# Patient Record
Sex: MALE | Race: WHITE | NOT HISPANIC OR LATINO | Employment: OTHER | URBAN - METROPOLITAN AREA
[De-identification: names, ages, dates, MRNs, and addresses within clinical notes are randomized per-mention and may not be internally consistent; named-entity substitution may affect disease eponyms.]

---

## 2017-01-12 ENCOUNTER — OFFICE VISIT (OUTPATIENT)
Dept: INFECTIOUS DISEASES | Facility: CLINIC | Age: 42
End: 2017-01-12
Payer: MEDICARE

## 2017-01-12 ENCOUNTER — OFFICE VISIT (OUTPATIENT)
Dept: ORTHOPEDICS | Facility: CLINIC | Age: 42
End: 2017-01-12
Payer: MEDICARE

## 2017-01-12 VITALS
BODY MASS INDEX: 26.42 KG/M2 | RESPIRATION RATE: 18 BRPM | DIASTOLIC BLOOD PRESSURE: 82 MMHG | SYSTOLIC BLOOD PRESSURE: 139 MMHG | HEIGHT: 76 IN | HEART RATE: 84 BPM | WEIGHT: 216.94 LBS

## 2017-01-12 VITALS
HEIGHT: 76 IN | HEART RATE: 77 BPM | SYSTOLIC BLOOD PRESSURE: 128 MMHG | TEMPERATURE: 98 F | DIASTOLIC BLOOD PRESSURE: 78 MMHG | BODY MASS INDEX: 26.23 KG/M2 | WEIGHT: 215.38 LBS

## 2017-01-12 DIAGNOSIS — L03.113 CELLULITIS OF RIGHT UPPER ARM: Primary | ICD-10-CM

## 2017-01-12 DIAGNOSIS — Z09 S/P ORTHOPEDIC SURGERY, FOLLOW-UP EXAM: ICD-10-CM

## 2017-01-12 DIAGNOSIS — Z21 HIV POSITIVE: ICD-10-CM

## 2017-01-12 DIAGNOSIS — L03.113 CELLULITIS OF RIGHT UPPER ARM: ICD-10-CM

## 2017-01-12 DIAGNOSIS — R21 RASH, SKIN: ICD-10-CM

## 2017-01-12 DIAGNOSIS — L02.413 ABSCESS OF RIGHT SHOULDER: ICD-10-CM

## 2017-01-12 DIAGNOSIS — K64.0 FIRST DEGREE HEMORRHOIDS: ICD-10-CM

## 2017-01-12 DIAGNOSIS — R22.31 MASS OF RIGHT UPPER EXTREMITY: Primary | ICD-10-CM

## 2017-01-12 PROCEDURE — 99999 PR PBB SHADOW E&M-EST. PATIENT-LVL III: CPT | Mod: PBBFAC,,, | Performed by: PHYSICIAN ASSISTANT

## 2017-01-12 PROCEDURE — 99213 OFFICE O/P EST LOW 20 MIN: CPT | Mod: PBBFAC,27 | Performed by: INTERNAL MEDICINE

## 2017-01-12 PROCEDURE — 99214 OFFICE O/P EST MOD 30 MIN: CPT | Mod: S$PBB,,, | Performed by: INTERNAL MEDICINE

## 2017-01-12 PROCEDURE — 99024 POSTOP FOLLOW-UP VISIT: CPT | Mod: ,,, | Performed by: PHYSICIAN ASSISTANT

## 2017-01-12 PROCEDURE — 99999 PR PBB SHADOW E&M-EST. PATIENT-LVL III: CPT | Mod: PBBFAC,,, | Performed by: INTERNAL MEDICINE

## 2017-01-12 RX ORDER — KETOCONAZOLE 20 MG/G
CREAM TOPICAL 2 TIMES DAILY
Qty: 60 G | Refills: 12 | Status: SHIPPED | OUTPATIENT
Start: 2017-01-12 | End: 2017-11-15

## 2017-01-12 RX ORDER — TRIAMCINOLONE ACETONIDE 1 MG/G
CREAM TOPICAL 2 TIMES DAILY
Qty: 30 G | Refills: 2 | Status: SHIPPED | OUTPATIENT
Start: 2017-01-12 | End: 2017-11-15

## 2017-01-12 NOTE — PROGRESS NOTES
Subjective:      Patient ID: Moisés Koehler is a 41 y.o. male.    Chief Complaint:Follow-up      History of Present Illness  Developed abscess at right deltoid injection site - no culture growth or organisms on stains. Had I+D, tolerated Bactrim, no finished Bactrim yesterday.  Has rashes on legs - chronic. Still itching.  States that he is having multiple stools - no blood, no diarrhea. Now has hemorrhoid. Does not think that his colitis is acting up.   Not using injection testosterone. Wants to start HGH or topical testosterone.    Review of Systems   Constitution: Negative for chills, decreased appetite, fever, weakness, malaise/fatigue, night sweats, weight gain and weight loss.   HENT: Negative for congestion, ear pain, headaches, hearing loss, hoarse voice, sore throat and tinnitus.    Eyes: Negative for blurred vision, redness and visual disturbance.   Cardiovascular: Negative for chest pain, leg swelling and palpitations.   Respiratory: Negative for cough, hemoptysis, shortness of breath and sputum production.    Hematologic/Lymphatic: Negative for adenopathy. Does not bruise/bleed easily.   Skin: Positive for itching. Negative for dry skin, rash and suspicious lesions.   Musculoskeletal: Negative for back pain, joint pain, myalgias and neck pain.   Gastrointestinal: Positive for diarrhea. Negative for abdominal pain, constipation, heartburn, nausea and vomiting.   Genitourinary: Positive for frequency. Negative for dysuria, flank pain, hematuria, hesitancy and urgency.   Neurological: Negative for dizziness, numbness and paresthesias.   Psychiatric/Behavioral: Negative for depression and memory loss. The patient does not have insomnia and is not nervous/anxious.      Objective:   Physical Exam   Constitutional: He is oriented to person, place, and time. He appears well-developed and well-nourished.   HENT:   Head: Normocephalic and atraumatic.   Eyes: Conjunctivae are normal. Pupils are equal, round,  and reactive to light.   Neck: Normal range of motion. Neck supple.   Cardiovascular: Normal rate, regular rhythm and normal heart sounds.    Pulmonary/Chest: Effort normal and breath sounds normal.   Abdominal: Soft. Bowel sounds are normal.   Musculoskeletal: Normal range of motion. He exhibits no edema.        Right shoulder: He exhibits normal range of motion, no tenderness, no bony tenderness, no swelling, no deformity, no laceration (healed incision - see picture.) and no pain.   Neurological: He is alert and oriented to person, place, and time.   Skin: Skin is warm and dry.        Psychiatric: He has a normal mood and affect. His behavior is normal. Judgment and thought content normal.   Nursing note and vitals reviewed.                Assessment:       1. Mass of right upper extremity    2. Cellulitis of right upper arm    3. HIV positive    4. Abscess of right shoulder    5. Rash, skin    6. First degree hemorrhoids          Plan:       Will start proctofoam for hemorrhoids. Will refer to CRS if colitis flares.  Continue HAART - no need to check HIV parameters at this time  Start Nizoral cream for rash on right leg - will try steroid cream on rash on left leg.   Patient has sterile abscesses from testosterone injections - will pursue topical testosterone cream.  Return in 1 month

## 2017-01-12 NOTE — PROGRESS NOTES
Mr. Koehler is here today for a post-operative visit after a   Irrigation and debridement of right upper extremity  by Dr. Busch on 12/27/2016.  he reports that he is doing well.  Pain is controlled.  he is  taking pain medication.  he denies fever, chills, and sweats since the time of the surgery.   Stated that he has taken antibiotic, Bactrim,  as prescribed by infectious disease.     Physical exam:  Post op dressing taken down.  Incision is clean, dry and intact.  Sutures removed without difficulty.  full range of motion of shoulder pain free. NVI,     Assessment:  Post-op visit (2 weeks)    Plan:  - weight bearing as tolerated, range of motion as tolerated.  - continue antibiotic as prescribed by infectious disease, Has followed up with ID.   - return to clinic as needed.

## 2017-01-12 NOTE — LETTER
January 12, 2017      Francisco Singh MD  [address]  [facility] [address]           [facility]  [address]  [facility] [address]  Phone: [phone]          Patient: Moisés Koehler   MR Number: [id number]   YOB: 1975   Date of Visit: 1/12/2017       Dear Dr. Francisco Singh:    Thank you for referring Moisés Koehler to me for evaluation. Attached you will find relevant portions of my assessment and plan of care.    If you have questions, please do not hesitate to call me. I look forward to following Moisés Koehler along with you.    Sincerely,    Geneva Knott PA-C    Enclosure  CC:  No Recipients    If you would like to receive this communication electronically, please contact [email or web address] or [phone] to request more information on [facility] [employer] access.    For providers and/or their staff who would like to refer a patient to [facility], please contact us through our one-stop-shop provider referral line, Radah Contreras, at [phone].    If you feel you have received this communication in error or would no longer like to receive these types of communications, please e-mail [email or web address]

## 2017-01-12 NOTE — MR AVS SNAPSHOT
[facility]  [address]  [facility] [address]  Phone: [phone]  Fax: [phone]                  Moisés Koehler   2017 9:00 AM   Office Visit    Description:  Male : 1975   Provider:  Smooth Millan MD   Department:  [facility]           Reason for Visit     Follow-up           Diagnoses this Visit        Comments    Mass of right upper extremity    -  Primary     Cellulitis of right upper arm         HIV positive         Abscess of right shoulder         Rash, skin         First degree hemorrhoids                To Do List           Future Appointments        Provider Department Dept Phone    2017 9:40 AM LAB, APPOINTMENT [place] [facility] [phone]    2017 1:30 PM Rakesh العلي MD [facility] - [facility] Medicine [phone]    2/3/2017 1:00 PM [facility] [phone]      Goals (5 Years of Data)     None       These Medications        Disp Refills Start End    ketoconazole (NIZORAL) 2 % cream 60 g 12 2017    Apply topically 2 (two) times daily. Apply to right leg - Topical (Top)    Pharmacy: [facility] -- [facility], [place] - [address] [place] [address], [address] Ph #: [phone]       triamcinolone acetonide 0.1% (KENALOG) 0.1 % cream 30 g 2 2017    Apply topically 2 (two) times daily. Apply to left  leg - Topical (Top)    Pharmacy: [facility] -- [facility], [place] - [address] [place] [address], [address] Ph #: [phone]       hydrocortisone-pramoxine (PROCTOFOAM-HS) rectal foam 10 g 2 2017     Place 1 applicator rectally 2 (two) times daily. - Rectal    Pharmacy: [facility] -- [facility], [place] - [address], [address] Ph #: [phone]         [facility] On [facility]     [facility] On Call Nurse Care Line -  Assistance  Registered nurses in the [facility] provide clinical  advisement, health education, appointment booking, and other advisory services.  Call for this free service at [phone].             Medications           Message regarding Medications     Verify the changes and/or additions to your medication regime listed below are the same as discussed with your clinician today.  If any of these changes or additions are incorrect, please notify your healthcare provider.        START taking these NEW medications        Refills    ketoconazole (NIZORAL) 2 % cream 12    Sig: Apply topically 2 (two) times daily. Apply to right leg    Class: Print    Route: Topical (Top)    triamcinolone acetonide 0.1% (KENALOG) 0.1 % cream 2    Sig: Apply topically 2 (two) times daily. Apply to left  leg    Class: Normal    Route: Topical (Top)    hydrocortisone-pramoxine (PROCTOFOAM-HS) rectal foam 2    Sig: Place 1 applicator rectally 2 (two) times daily.    Class: Normal    Route: Rectal           Verify that the below list of medications is an accurate representation of the medications you are currently taking.  If none reported, the list may be blank. If incorrect, please contact your healthcare provider. Carry this list with you in case of emergency.           Current Medications     efavirenz-emtrictabine-tenofovir 600-200-300 mg (ATRIPLA) 600-200-300 mg Tab Take 1 tablet by mouth every evening.    lisinopril (PRINIVIL,ZESTRIL) 40 MG tablet Take 1 tablet (40 mg total) by mouth once daily.    NEXIUM 40 mg capsule TAKE ONE CAPSULE BY MOUTH EVERY DAY    hydrocortisone-pramoxine (PROCTOFOAM-HS) rectal foam Place 1 applicator rectally 2 (two) times daily.    ketoconazole (NIZORAL) 2 % cream Apply topically 2 (two) times daily. Apply to right leg    triamcinolone acetonide 0.1% (KENALOG) 0.1 % cream Apply topically 2 (two) times daily. Apply to left  leg           Clinical Reference Information           Vital Signs - Last Recorded  Most recent update: 1/12/2017  8:31 AM by Valerie Baron MA  "   BP Pulse Temp Ht Wt BMI    128/78 77 97.9 °F (36.6 °C) (Oral) 6' 4" (1.93 m) 97.7 kg (215 lb 6.2 oz) 26.22 kg/m2      Blood Pressure          Most Recent Value    BP  128/78      Allergies as of 1/12/2017     No Known Allergies      Immunizations Administered on Date of Encounter - 1/12/2017     None      "

## 2017-01-13 RX ORDER — EFAVIRENZ, EMTRICITABINE, AND TENOFOVIR DISOPROXIL FUMARATE 600; 200; 300 MG/1; MG/1; MG/1
TABLET, FILM COATED ORAL
Qty: 30 TABLET | Refills: 0 | Status: SHIPPED | OUTPATIENT
Start: 2017-01-13 | End: 2017-02-08 | Stop reason: SDUPTHER

## 2017-01-25 ENCOUNTER — PATIENT MESSAGE (OUTPATIENT)
Dept: INFECTIOUS DISEASES | Facility: CLINIC | Age: 42
End: 2017-01-25

## 2017-01-25 DIAGNOSIS — R63.4 WEIGHT LOSS, NON-INTENTIONAL: Primary | ICD-10-CM

## 2017-02-09 RX ORDER — EFAVIRENZ, EMTRICITABINE, AND TENOFOVIR DISOPROXIL FUMARATE 600; 200; 300 MG/1; MG/1; MG/1
TABLET, FILM COATED ORAL
Qty: 30 TABLET | Refills: 0 | Status: SHIPPED | OUTPATIENT
Start: 2017-02-09 | End: 2017-03-14 | Stop reason: SDUPTHER

## 2017-02-13 ENCOUNTER — OFFICE VISIT (OUTPATIENT)
Dept: INFECTIOUS DISEASES | Facility: CLINIC | Age: 42
End: 2017-02-13
Payer: MEDICARE

## 2017-02-13 VITALS
BODY MASS INDEX: 25.82 KG/M2 | TEMPERATURE: 98 F | WEIGHT: 212.06 LBS | SYSTOLIC BLOOD PRESSURE: 131 MMHG | HEIGHT: 76 IN | HEART RATE: 78 BPM | DIASTOLIC BLOOD PRESSURE: 82 MMHG

## 2017-02-13 DIAGNOSIS — R12 CHRONIC HEARTBURN: ICD-10-CM
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
## 2017-02-13 DIAGNOSIS — R63.4 WEIGHT LOSS, NON-INTENTIONAL: Primary | ICD-10-CM

## 2017-02-13 DIAGNOSIS — R21 RASH, SKIN: ICD-10-CM

## 2017-02-13 DIAGNOSIS — L02.413 ABSCESS OF RIGHT SHOULDER: ICD-10-CM

## 2017-02-13 DIAGNOSIS — Z21 HIV POSITIVE: ICD-10-CM

## 2017-02-13 DIAGNOSIS — I10 ESSENTIAL HYPERTENSION: ICD-10-CM

## 2017-02-13 PROCEDURE — 99213 OFFICE O/P EST LOW 20 MIN: CPT | Mod: PBBFAC | Performed by: INTERNAL MEDICINE

## 2017-02-13 PROCEDURE — 99214 OFFICE O/P EST MOD 30 MIN: CPT | Mod: S$PBB,,, | Performed by: INTERNAL MEDICINE

## 2017-02-13 PROCEDURE — 90714 TD VACC NO PRESV 7 YRS+ IM: CPT | Mod: PBBFAC | Performed by: INTERNAL MEDICINE

## 2017-02-13 PROCEDURE — 99999 PR PBB SHADOW E&M-EST. PATIENT-LVL III: CPT | Mod: PBBFAC,,, | Performed by: INTERNAL MEDICINE

## 2017-02-13 RX ORDER — DOXYCYCLINE HYCLATE 100 MG
100 TABLET ORAL 2 TIMES DAILY
Qty: 30 TABLET | Refills: 3 | Status: SHIPPED | OUTPATIENT
Start: 2017-02-13 | End: 2017-02-28

## 2017-02-13 NOTE — PROGRESS NOTES
Subjective:      Patient ID: Moisés Koehler is a 41 y.o. male.    Chief Complaint:Follow-up      History of Present Illness  Stepped on nail on Saturday. No fever or chills - worried about a piece of boot being stuck in foot. Losing weight - worried that he is losing muscle mass.  Needs Tetanus booster.    Review of Systems   Constitution: Negative for chills, decreased appetite, fever, weakness, malaise/fatigue, night sweats, weight gain and weight loss.   HENT: Negative for congestion, ear pain, headaches, hearing loss, hoarse voice, sore throat and tinnitus.    Eyes: Negative for blurred vision, redness and visual disturbance.   Cardiovascular: Negative for chest pain, leg swelling and palpitations.   Respiratory: Negative for cough, hemoptysis, shortness of breath and sputum production.    Hematologic/Lymphatic: Negative for adenopathy. Does not bruise/bleed easily.   Skin: Positive for itching and rash. Negative for dry skin and suspicious lesions.   Musculoskeletal: Negative for back pain, joint pain, myalgias and neck pain.   Gastrointestinal: Negative for abdominal pain, constipation, diarrhea, heartburn, nausea and vomiting.   Genitourinary: Negative for dysuria, flank pain, frequency, hematuria, hesitancy and urgency.   Neurological: Negative for dizziness, numbness and paresthesias.   Psychiatric/Behavioral: Negative for depression and memory loss. The patient does not have insomnia and is not nervous/anxious.      Objective:   Physical Exam   Constitutional: He is oriented to person, place, and time. He appears well-developed. He appears cachectic.   HENT:   Head: Normocephalic and atraumatic.   Mild temporal wasting   Eyes: Conjunctivae are normal. Pupils are equal, round, and reactive to light.   Neck: Normal range of motion. Neck supple.   Cardiovascular: Normal rate, regular rhythm and normal heart sounds.    Pulmonary/Chest: Effort normal and breath sounds normal.   Abdominal: Soft. Bowel  sounds are normal.   Musculoskeletal: Normal range of motion. He exhibits no edema.        Right shoulder: He exhibits normal range of motion, no tenderness, no bony tenderness, no swelling, no deformity, no laceration (healed incision - see picture.) and no pain.        Feet:    Feet:   Left Foot:   Skin Integrity: Negative for ulcer, blister or erythema.   Neurological: He is alert and oriented to person, place, and time.   Skin: Skin is warm and dry.        Psychiatric: He has a normal mood and affect. His behavior is normal. Judgment and thought content normal.   Nursing note and vitals reviewed.                Assessment:       1. Weight loss, non-intentional    2. HIV positive    3. Chronic heartburn    4. Essential hypertension    5. Abscess of right shoulder    6. Injury by nail, initial encounter    7. Rash, skin          Plan:       Tetanus booster today  Serostim for HIV wasting  Doxycycline for possible infection  Check HIV parameters  Return in 1 month

## 2017-02-14 ENCOUNTER — LAB VISIT (OUTPATIENT)
Dept: LAB | Facility: HOSPITAL | Age: 42
End: 2017-02-14
Attending: INTERNAL MEDICINE
Payer: MEDICARE

## 2017-02-14 DIAGNOSIS — I10 ESSENTIAL HYPERTENSION: ICD-10-CM

## 2017-02-14 DIAGNOSIS — R63.4 WEIGHT LOSS, NON-INTENTIONAL: ICD-10-CM

## 2017-02-14 DIAGNOSIS — L02.413 ABSCESS OF RIGHT SHOULDER: ICD-10-CM

## 2017-02-14 DIAGNOSIS — R21 RASH, SKIN: ICD-10-CM

## 2017-02-14 DIAGNOSIS — R12 CHRONIC HEARTBURN: ICD-10-CM
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
## 2017-02-14 DIAGNOSIS — Z21 HIV POSITIVE: ICD-10-CM

## 2017-02-14 PROCEDURE — 84270 ASSAY OF SEX HORMONE GLOBUL: CPT

## 2017-02-14 PROCEDURE — 87536 HIV-1 QUANT&REVRSE TRNSCRPJ: CPT

## 2017-02-14 PROCEDURE — 86361 T CELL ABSOLUTE COUNT: CPT

## 2017-02-14 PROCEDURE — 36415 COLL VENOUS BLD VENIPUNCTURE: CPT

## 2017-02-15 LAB
CD3+CD4+ CELLS # BLD: 789 CELLS/UL (ref 300–1400)
CD3+CD4+ CELLS NFR BLD: 30.8 % (ref 28–57)

## 2017-02-16 ENCOUNTER — PATIENT MESSAGE (OUTPATIENT)
Dept: INFECTIOUS DISEASES | Facility: CLINIC | Age: 42
End: 2017-02-16

## 2017-02-16 LAB
HIV UQ DATE RECEIVED: NORMAL
HIV UQ DATE REPORTED: NORMAL
HIV1 RNA # SERPL NAA+PROBE: <40 COPIES/ML
HIV1 RNA SERPL NAA+PROBE-LOG#: <1.6 LOG (10) COPIES/ML
HIV1 RNA SERPL QL NAA+PROBE: NOT DETECTED

## 2017-02-18 LAB
ALBUMIN SERPL-MCNC: 4.9 G/DL (ref 3.6–5.1)
SHBG SERPL-SCNC: 53 NMOL/L (ref 10–50)
TESTOST FREE SERPL-MCNC: 30.2 PG/ML (ref 46–224)
TESTOST SERPL-MCNC: 357 NG/DL (ref 250–1100)
TESTOSTERONE.FREE+WB SERPL-MCNC: 67.3 NG/DL (ref 110–575)

## 2017-02-20 ENCOUNTER — PATIENT MESSAGE (OUTPATIENT)
Dept: INFECTIOUS DISEASES | Facility: CLINIC | Age: 42
End: 2017-02-20

## 2017-03-02 ENCOUNTER — PATIENT MESSAGE (OUTPATIENT)
Dept: INFECTIOUS DISEASES | Facility: CLINIC | Age: 42
End: 2017-03-02

## 2017-03-06 ENCOUNTER — OFFICE VISIT (OUTPATIENT)
Dept: INFECTIOUS DISEASES | Facility: CLINIC | Age: 42
End: 2017-03-06
Payer: MEDICARE

## 2017-03-06 VITALS
WEIGHT: 215.63 LBS | BODY MASS INDEX: 26.26 KG/M2 | DIASTOLIC BLOOD PRESSURE: 78 MMHG | TEMPERATURE: 98 F | HEART RATE: 84 BPM | SYSTOLIC BLOOD PRESSURE: 137 MMHG | HEIGHT: 76 IN

## 2017-03-06 DIAGNOSIS — E34.9 TESTOSTERONE DEFICIENCY: ICD-10-CM

## 2017-03-06 DIAGNOSIS — Z21 HIV POSITIVE: Primary | ICD-10-CM
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
## 2017-03-06 DIAGNOSIS — R63.4 WEIGHT LOSS, NON-INTENTIONAL: ICD-10-CM

## 2017-03-06 DIAGNOSIS — R21 RASH, SKIN: ICD-10-CM
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
## 2017-03-06 PROCEDURE — 99213 OFFICE O/P EST LOW 20 MIN: CPT | Mod: PBBFAC | Performed by: INTERNAL MEDICINE

## 2017-03-06 PROCEDURE — 99214 OFFICE O/P EST MOD 30 MIN: CPT | Mod: S$PBB,,, | Performed by: INTERNAL MEDICINE

## 2017-03-06 PROCEDURE — 99999 PR PBB SHADOW E&M-EST. PATIENT-LVL III: CPT | Mod: PBBFAC,,, | Performed by: INTERNAL MEDICINE

## 2017-03-06 NOTE — PROGRESS NOTES
Subjective:      Patient ID: Moisés Koehler is a 41 y.o. male.    Chief Complaint:Follow-up      History of Present Illness    Did not receive serostim. Has lost weight. Low appetite. Rash on legs is better, slightly.  Wants to pursue serostim and not testosterone replacement at this time.    Review of Systems   Constitution: Negative for chills, decreased appetite, fever, weakness, malaise/fatigue, night sweats, weight gain and weight loss.   HENT: Negative for congestion, ear pain, headaches, hearing loss, hoarse voice, sore throat and tinnitus.    Eyes: Negative for blurred vision, redness and visual disturbance.   Cardiovascular: Negative for chest pain, leg swelling and palpitations.   Respiratory: Negative for cough, hemoptysis, shortness of breath and sputum production.    Hematologic/Lymphatic: Negative for adenopathy. Does not bruise/bleed easily.   Skin: Positive for rash. Negative for dry skin, itching and suspicious lesions.   Musculoskeletal: Positive for joint swelling. Negative for back pain, joint pain, myalgias and neck pain.   Gastrointestinal: Negative for abdominal pain, constipation, diarrhea, heartburn, nausea and vomiting.   Genitourinary: Negative for dysuria, flank pain, frequency, hematuria, hesitancy and urgency.   Neurological: Negative for dizziness, numbness and paresthesias.   Psychiatric/Behavioral: Positive for depression. Negative for memory loss. The patient is nervous/anxious. The patient does not have insomnia.      Objective:   Physical Exam   Constitutional: He is oriented to person, place, and time. He appears well-developed. He appears cachectic.   HENT:   Head: Normocephalic and atraumatic.   Mild temporal wasting   Eyes: Conjunctivae are normal. Pupils are equal, round, and reactive to light.   Neck: Normal range of motion. Neck supple.   Cardiovascular: Normal rate, regular rhythm and normal heart sounds.    Pulmonary/Chest: Effort normal and breath sounds normal.    Abdominal: Soft. Bowel sounds are normal.   Musculoskeletal: Normal range of motion. He exhibits no edema.        Right shoulder: He exhibits normal range of motion, no tenderness, no bony tenderness, no swelling, no deformity, no laceration (healed incision - see picture.) and no pain.        Feet:    Feet:   Left Foot:   Skin Integrity: Negative for ulcer, blister or erythema.   Neurological: He is alert and oriented to person, place, and time.   Skin: Skin is warm and dry.        Psychiatric: His speech is normal and behavior is normal. Judgment and thought content normal. He exhibits a depressed mood.   Nursing note and vitals reviewed.            Assessment:       1. HIV positive    2. Injury by nail, subsequent encounter    3. Testosterone deficiency    4. Rash, skin    5. Weight loss, non-intentional          Plan:       Called [employer] - they stated PA was necessary for serostim. Asked them to re-fax it to [phone].  Nail wound is resolved.  Rash - will continue antifungal cream  Continue HAART

## 2017-03-08 ENCOUNTER — PATIENT MESSAGE (OUTPATIENT)
Dept: INFECTIOUS DISEASES | Facility: CLINIC | Age: 42
End: 2017-03-08

## 2017-03-14 RX ORDER — EFAVIRENZ, EMTRICITABINE, AND TENOFOVIR DISOPROXIL FUMARATE 600; 200; 300 MG/1; MG/1; MG/1
TABLET, FILM COATED ORAL
Qty: 30 TABLET | Refills: 0 | Status: SHIPPED | OUTPATIENT
Start: 2017-03-14 | End: 2017-04-05 | Stop reason: SDUPTHER

## 2017-03-21 ENCOUNTER — PATIENT MESSAGE (OUTPATIENT)
Dept: INFECTIOUS DISEASES | Facility: CLINIC | Age: 42
End: 2017-03-21

## 2017-03-31 ENCOUNTER — PATIENT MESSAGE (OUTPATIENT)
Dept: INFECTIOUS DISEASES | Facility: CLINIC | Age: 42
End: 2017-03-31

## 2017-04-05 RX ORDER — EFAVIRENZ, EMTRICITABINE, AND TENOFOVIR DISOPROXIL FUMARATE 600; 200; 300 MG/1; MG/1; MG/1
TABLET, FILM COATED ORAL
Qty: 30 TABLET | Refills: 0 | Status: SHIPPED | OUTPATIENT
Start: 2017-04-05 | End: 2017-05-18 | Stop reason: SDUPTHER

## 2017-05-18 RX ORDER — EFAVIRENZ, EMTRICITABINE AND TENOFOVIR DISOPROXIL FUMARATE 600; 200; 300 MG/1; MG/1; MG/1
1 TABLET, FILM COATED ORAL NIGHTLY
Qty: 30 TABLET | Refills: 0 | Status: SHIPPED | OUTPATIENT
Start: 2017-05-18 | End: 2017-06-12 | Stop reason: SDUPTHER

## 2017-06-12 ENCOUNTER — PATIENT MESSAGE (OUTPATIENT)
Dept: INFECTIOUS DISEASES | Facility: CLINIC | Age: 42
End: 2017-06-12

## 2017-06-12 RX ORDER — EFAVIRENZ, EMTRICITABINE AND TENOFOVIR DISOPROXIL FUMARATE 600; 200; 300 MG/1; MG/1; MG/1
1 TABLET, FILM COATED ORAL NIGHTLY
Qty: 30 TABLET | Refills: 0 | Status: SHIPPED | OUTPATIENT
Start: 2017-06-12 | End: 2017-07-18 | Stop reason: SDUPTHER

## 2017-07-18 RX ORDER — EFAVIRENZ, EMTRICITABINE AND TENOFOVIR DISOPROXIL FUMARATE 600; 200; 300 MG/1; MG/1; MG/1
1 TABLET, FILM COATED ORAL NIGHTLY
Qty: 30 TABLET | Refills: 0 | Status: SHIPPED | OUTPATIENT
Start: 2017-07-18 | End: 2017-08-15 | Stop reason: SDUPTHER

## 2017-08-15 RX ORDER — EFAVIRENZ, EMTRICITABINE AND TENOFOVIR DISOPROXIL FUMARATE 600; 200; 300 MG/1; MG/1; MG/1
1 TABLET, FILM COATED ORAL NIGHTLY
Qty: 30 TABLET | Refills: 0 | Status: SHIPPED | OUTPATIENT
Start: 2017-08-15 | End: 2017-09-18 | Stop reason: SDUPTHER

## 2017-09-05 ENCOUNTER — OFFICE VISIT (OUTPATIENT)
Dept: INFECTIOUS DISEASES | Facility: CLINIC | Age: 42
End: 2017-09-05
Payer: MEDICARE

## 2017-09-05 VITALS
BODY MASS INDEX: 25.56 KG/M2 | WEIGHT: 209.88 LBS | HEIGHT: 76 IN | SYSTOLIC BLOOD PRESSURE: 136 MMHG | TEMPERATURE: 99 F | DIASTOLIC BLOOD PRESSURE: 90 MMHG | HEART RATE: 79 BPM

## 2017-09-05 DIAGNOSIS — E34.9 TESTOSTERONE DEFICIENCY: ICD-10-CM

## 2017-09-05 DIAGNOSIS — E88.A HIV DISEASE RESULTING IN WASTING SYNDROME: ICD-10-CM

## 2017-09-05 DIAGNOSIS — R63.4 WEIGHT LOSS, NON-INTENTIONAL: Primary | ICD-10-CM

## 2017-09-05 DIAGNOSIS — R21 RASH, SKIN: ICD-10-CM

## 2017-09-05 DIAGNOSIS — K51.00 ULCERATIVE PANCOLITIS WITHOUT COMPLICATION: ICD-10-CM

## 2017-09-05 DIAGNOSIS — Z85.07 PERSONAL HISTORY OF MALIGNANT NEOPLASM OF PANCREAS: ICD-10-CM

## 2017-09-05 DIAGNOSIS — R10.13 EPIGASTRIC PAIN: ICD-10-CM

## 2017-09-05 DIAGNOSIS — G89.29 CHRONIC LEFT SHOULDER PAIN: ICD-10-CM

## 2017-09-05 DIAGNOSIS — Z85.00 PERSONAL HISTORY OF MALIGNANT NEOPLASM OF DIGESTIVE ORGAN: ICD-10-CM

## 2017-09-05 DIAGNOSIS — M25.512 CHRONIC LEFT SHOULDER PAIN: ICD-10-CM

## 2017-09-05 DIAGNOSIS — S43.102S SEPARATION OF LEFT ACROMIOCLAVICULAR JOINT, SEQUELA: ICD-10-CM

## 2017-09-05 DIAGNOSIS — B20 HIV DISEASE RESULTING IN WASTING SYNDROME: ICD-10-CM

## 2017-09-05 PROBLEM — S43.102A SEPARATION OF LEFT ACROMIOCLAVICULAR JOINT: Status: ACTIVE | Noted: 2017-09-05

## 2017-09-05 PROCEDURE — 3075F SYST BP GE 130 - 139MM HG: CPT | Mod: ,,, | Performed by: INTERNAL MEDICINE

## 2017-09-05 PROCEDURE — 3080F DIAST BP >= 90 MM HG: CPT | Mod: ,,, | Performed by: INTERNAL MEDICINE

## 2017-09-05 PROCEDURE — 99999 PR PBB SHADOW E&M-EST. PATIENT-LVL III: CPT | Mod: PBBFAC,,, | Performed by: INTERNAL MEDICINE

## 2017-09-05 PROCEDURE — 99214 OFFICE O/P EST MOD 30 MIN: CPT | Mod: S$PBB,,, | Performed by: INTERNAL MEDICINE

## 2017-09-05 PROCEDURE — 99213 OFFICE O/P EST LOW 20 MIN: CPT | Mod: PBBFAC | Performed by: INTERNAL MEDICINE

## 2017-09-05 NOTE — PROGRESS NOTES
Subjective:      Patient ID: Moisés Koehler is a 41 y.o. male.    Chief Complaint:Follow-up      History of Present Illness  Notes bloating and pain. Has had weight loss and muscle loss. Still has skin rash.  Broke shoulder in past. Now has lost muscle tone - in constant pain. Feels grinding.  Stopped taking Lisinopril - was making him sweat. Now he doesn't sweat as much.    Mother  of pancreatic cancer. He has not taken care of his ulcerative colitis. Admits that he may have UC as part of his bloating - worried that he may have cancer.    Has lost a lot of overall weight since last visit, also lost muscle mass, despite exercise and protein diet.    Review of Systems   Constitution: Positive for malaise/fatigue and weight loss. Negative for chills, decreased appetite, fever, weakness, night sweats and weight gain.   HENT: Negative for congestion, ear pain, hearing loss, hoarse voice, sore throat and tinnitus.    Eyes: Negative for blurred vision, redness and visual disturbance.   Cardiovascular: Negative for chest pain, leg swelling and palpitations.   Respiratory: Negative for cough, hemoptysis, shortness of breath and sputum production.    Hematologic/Lymphatic: Negative for adenopathy. Does not bruise/bleed easily.   Skin: Positive for rash. Negative for dry skin, itching and suspicious lesions.   Musculoskeletal: Negative for back pain, joint pain, myalgias and neck pain.   Gastrointestinal: Positive for abdominal pain and heartburn. Negative for constipation, diarrhea, nausea and vomiting.   Genitourinary: Negative for dysuria, flank pain, frequency, hematuria, hesitancy and urgency.   Neurological: Negative for dizziness, headaches, numbness and paresthesias.   Psychiatric/Behavioral: Negative for depression and memory loss. The patient does not have insomnia and is not nervous/anxious.      Objective:   Physical Exam   Constitutional: He is oriented to person, place, and time. He appears cachectic.    HENT:   Head: Normocephalic and atraumatic.   Eyes: Conjunctivae and EOM are normal. Pupils are equal, round, and reactive to light.   Neck: Normal range of motion. Neck supple.   Cardiovascular: Normal rate, regular rhythm and normal heart sounds.    Pulmonary/Chest: Effort normal and breath sounds normal.   Abdominal: Soft. Bowel sounds are normal. He exhibits no distension and no pulsatile midline mass. There is no hepatosplenomegaly. There is tenderness in the epigastric area. There is no rigidity and no rebound.   Musculoskeletal: Normal range of motion. He exhibits no edema.        Left shoulder: He exhibits tenderness and bony tenderness. He exhibits no effusion.        Arms:  Neurological: He is alert and oriented to person, place, and time.   Skin: Skin is warm and dry.   Nursing note and vitals reviewed.    Assessment:       1. Weight loss, non-intentional    2. Testosterone deficiency    3. 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 Rash, skin    5. Epigastric pain    6. Personal history of malignant neoplasm of pancreas     7. Personal history of malignant neoplasm of pancreas    8. Ulcerative pancolitis without complication    9. Personal history of malignant neoplasm of digestive organ     10. Chronic left shoulder pain    11. Separation of left acromioclavicular joint, sequela          Plan:       Will start Serostim for HIV wasting  Continue HIV meds - check parameters  Will check testosterone levels - refer to urology for possible treatment  Check amylase, lipase, cancer serology for weight loss, abdominal pain, history of pancreatic cancer, risk for colon CA  Will discuss screening colonoscopy at next visit  Refer to orthopedics for shoulder evaluation - patient has had injury in past,  AC joint on left in past.  Will watch BP, may be from pain in left shoulder  Will start norvasc if no improvement  Follow up in 3 months  I spent over 50% of a 45 minute encounter counseling the  patient.

## 2017-09-06 ENCOUNTER — TELEPHONE (OUTPATIENT)
Dept: PHARMACY | Facility: CLINIC | Age: 42
End: 2017-09-06

## 2017-09-06 NOTE — TELEPHONE ENCOUNTER
Hi Dr Millan and staff,    Wanted to clarify the rx we received for Serostim (somatropin 6mg)    Per [facility] dosing for HIV-associated wasting, cachexia:  Serostim: SubQ: Initial: 0.1 mg/kg once daily at bedtime (maximum: 6 mg/day)    Please verify the dose (rx says 9mg/day)  And the quantity-- would be 1 vial/day x 28 days    Please advise, thank you!    Rand Ragsdale, Pharm.D  Specialty Pharmacy Clinical Pharmacist  [facility]  Phone: [phone]

## 2017-09-06 NOTE — TELEPHONE ENCOUNTER
Hi Dr Millan and staff,    I will addend the rx to state the following    Serostim 6mg  Inject 6mg SQ daily  #28 vials   5 refills    Unable to confirm if [facility] can dispense this drug but we will proceed with the PA based on this information if that is ok. Thank you!    Rand Ragsdale, Pharm.D  Specialty Pharmacy Clinical Pharmacist  [facility]  Phone: [phone]

## 2017-09-07 ENCOUNTER — TELEPHONE (OUTPATIENT)
Dept: PHARMACY | Facility: CLINIC | Age: 42
End: 2017-09-07

## 2017-09-07 ENCOUNTER — PATIENT MESSAGE (OUTPATIENT)
Dept: INFECTIOUS DISEASES | Facility: CLINIC | Age: 42
End: 2017-09-07

## 2017-09-08 NOTE — TELEPHONE ENCOUNTER
Hi Dr Millan and staff,    Patient's plan has denied coverage of Serostim because plan requires patient to have tried and failed at least 2 of the formulary alternatives which include the following  · Megestrol  · Dronabinol  · Cyproheptadine  · Norditropin flexpro (requires prior authorization)    If you have documentation or medical records supporting the necessity for SEROSTIM then we may have a basis for an appeal. Please advise hwo you would like to proceed. If you change it to Norditropin please send us a new rx. Thank you!    Thank you for your time.     Rand Ragsdale, Pharm.D  Specialty Pharmacy Clinical Pharmacist  [facility]  Phone: [phone]

## 2017-09-11 ENCOUNTER — PATIENT MESSAGE (OUTPATIENT)
Dept: INFECTIOUS DISEASES | Facility: CLINIC | Age: 42
End: 2017-09-11

## 2017-09-13 ENCOUNTER — PATIENT MESSAGE (OUTPATIENT)
Dept: INFECTIOUS DISEASES | Facility: CLINIC | Age: 42
End: 2017-09-13

## 2017-09-14 ENCOUNTER — TELEPHONE (OUTPATIENT)
Dept: PHARMACY | Facility: CLINIC | Age: 42
End: 2017-09-14

## 2017-09-14 NOTE — TELEPHONE ENCOUNTER
[facility] received a prescription for Norditropin and it will require a prior authorization with their insurance company. We will update patient of status as more information is received.

## 2017-09-15 ENCOUNTER — PATIENT MESSAGE (OUTPATIENT)
Dept: INFECTIOUS DISEASES | Facility: CLINIC | Age: 42
End: 2017-09-15

## 2017-09-15 NOTE — TELEPHONE ENCOUNTER
Appeal pending for serostim- spoke to [facility] for a peer to peer and they said someone will call back with 24 hours (business days)    Dr Millan aware of this. Not moving forward with Norditropin rx at this time due to dosing.     Rand Ragsdale, Pharm.D  Specialty Pharmacy Clinical Pharmacist  [facility]  Phone: [phone]

## 2017-09-18 DIAGNOSIS — Z21 HIV POSITIVE: Primary | ICD-10-CM

## 2017-09-18 RX ORDER — EFAVIRENZ, EMTRICITABINE AND TENOFOVIR DISOPROXIL FUMARATE 600; 200; 300 MG/1; MG/1; MG/1
1 TABLET, FILM COATED ORAL NIGHTLY
Qty: 30 TABLET | Refills: 0 | Status: SHIPPED | OUTPATIENT
Start: 2017-09-18 | End: 2017-10-17 | Stop reason: SDUPTHER

## 2017-09-18 NOTE — TELEPHONE ENCOUNTER
DOCUMENTATION ONLY         NORDITROPIN   PA was submitted to ins.  PA denied gave to [facility]

## 2017-09-19 NOTE — TELEPHONE ENCOUNTER
Hi Dr Millan and staff,    Wanted to inform you Serostim is a limited distribution medication and [facility] does not have access to order/dispense it.    Per insurance-- they will pay for it at [facility] and we confirmed they can dispense Serostim.     [facility]  [address] [place], [place] [address]   [phone]    I've added it to his preferred pharmacy list for future fills. Also verbally transferred the rx to them and they will reach out to the patient.    Patient has also been informed and given [facility]'s information.    Sorry for any confusion. Let me know if you have any questions.     Rand Ragsdale, Pharm.D  Specialty Pharmacy Clinical Pharmacist  [facility]  Phone: [phone]

## 2017-09-19 NOTE — TELEPHONE ENCOUNTER
Hi Dr Millan and staff,     Good news! Wanted to inform you the denial for Serostim was overturned and is now covered for $0 copay- Serostim 6mg- Inject 6mg into the skin daily- #28 vials for 28 day supply. Approved until further notice.      We are verifying ordering details for the Serostim and will be touch with the patient to confirm delivery.     Let me know if you have any questions.    Thank you,    Rand Ragsdale, Pharm.D  Specialty Pharmacy Clinical Pharmacist  [facility]  Phone: [phone]

## 2017-09-20 ENCOUNTER — PATIENT MESSAGE (OUTPATIENT)
Dept: INFECTIOUS DISEASES | Facility: CLINIC | Age: 42
End: 2017-09-20

## 2017-09-21 ENCOUNTER — PATIENT MESSAGE (OUTPATIENT)
Dept: INFECTIOUS DISEASES | Facility: CLINIC | Age: 42
End: 2017-09-21

## 2017-09-25 ENCOUNTER — TELEPHONE (OUTPATIENT)
Dept: INFECTIOUS DISEASES | Facility: CLINIC | Age: 42
End: 2017-09-25

## 2017-09-25 NOTE — TELEPHONE ENCOUNTER
----- Message from Maricel Coleman sent at 9/25/2017 10:55 AM CDT -----  Contact: Self [phone]  ..Medication question / problems.  PT needs syringes for  somatropin 6 mg SolR     ..  Greg [id number] @ [address] AT [address] [facility]  [address] [address]  Phone: [phone] Fax: [phone]

## 2017-09-28 ENCOUNTER — TELEPHONE (OUTPATIENT)
Dept: INFECTIOUS DISEASES | Facility: CLINIC | Age: 42
End: 2017-09-28

## 2017-09-28 NOTE — TELEPHONE ENCOUNTER
----- Message from Aracely Barfield sent at 9/28/2017 12:08 PM CDT -----  Contact: Self   [phone]  Pt calling to speak with the Dr ,  No additional information was provided  thanks

## 2017-10-12 ENCOUNTER — PATIENT MESSAGE (OUTPATIENT)
Dept: UROLOGY | Facility: CLINIC | Age: 42
End: 2017-10-12

## 2017-10-17 DIAGNOSIS — Z21 HIV POSITIVE: ICD-10-CM

## 2017-10-17 RX ORDER — EFAVIRENZ, EMTRICITABINE, AND TENOFOVIR DISOPROXIL FUMARATE 600; 200; 300 MG/1; MG/1; MG/1
1 TABLET, FILM COATED ORAL NIGHTLY
Qty: 30 TABLET | Refills: 0 | Status: SHIPPED | OUTPATIENT
Start: 2017-10-17 | End: 2017-11-16 | Stop reason: SDUPTHER

## 2017-11-06 ENCOUNTER — PATIENT MESSAGE (OUTPATIENT)
Dept: UROLOGY | Facility: CLINIC | Age: 42
End: 2017-11-06

## 2017-11-15 ENCOUNTER — OFFICE VISIT (OUTPATIENT)
Dept: UROLOGY | Facility: CLINIC | Age: 42
End: 2017-11-15
Payer: MEDICARE

## 2017-11-15 VITALS
SYSTOLIC BLOOD PRESSURE: 139 MMHG | HEIGHT: 76 IN | BODY MASS INDEX: 26.91 KG/M2 | WEIGHT: 221 LBS | HEART RATE: 102 BPM | DIASTOLIC BLOOD PRESSURE: 84 MMHG

## 2017-11-15 DIAGNOSIS — R53.83 FATIGUE, UNSPECIFIED TYPE: Primary | ICD-10-CM

## 2017-11-15 PROBLEM — E34.9 TESTOSTERONE DEFICIENCY: Status: RESOLVED | Noted: 2017-03-06 | Resolved: 2017-11-15

## 2017-11-15 PROBLEM — Z85.07 PERSONAL HISTORY OF MALIGNANT NEOPLASM OF PANCREAS: Status: RESOLVED | Noted: 2017-09-05 | Resolved: 2017-11-15

## 2017-11-15 PROCEDURE — 99999 PR PBB SHADOW E&M-EST. PATIENT-LVL III: CPT | Mod: PBBFAC,,, | Performed by: UROLOGY

## 2017-11-15 PROCEDURE — 99204 OFFICE O/P NEW MOD 45 MIN: CPT | Mod: S$PBB,,, | Performed by: UROLOGY

## 2017-11-15 PROCEDURE — 99213 OFFICE O/P EST LOW 20 MIN: CPT | Mod: PBBFAC | Performed by: UROLOGY

## 2017-11-15 RX ORDER — SOMATROPIN 6 MG
KIT SUBCUTANEOUS NIGHTLY
COMMUNITY
Start: 2017-11-13 | End: 2020-07-06

## 2017-11-15 NOTE — PROGRESS NOTES
CHIEF COMPLAINT:    Mr. Koehler is a 42 y.o. male presenting for a consultation at the request of Dr. Millan. Patient presents with fatigue.    PRESENTING ILLNESS:    Moisés Koehler is a 42 y.o. male who had a T drawn on him due to fatigue and a decreased libido.  It was normal at 491.  His calculated bioavailable is 189 ng/dL ( ng/dL).    He previously used anabolic steroids.    He has nocturia x 2.  Good FOS.  No hematuria.  No dysuria.    REVIEW OF SYSTEMS:    Moisés Koehler denies any history of headache, blurred vision, fever, nausea, vomiting, chills, abdominal pain, bleeding per rectum, cough, SOB, recent loss of consciousness, recent mental status changes, seizures, dizziness, or upper or lower extremity weakness.    FRANCIS  1. 3  2. 3  3. 4  4. 4  5. 1      PATIENT HISTORY:    Past Medical History:   Diagnosis Date    Anxiety     Arthritis     Atrial fibrillation     HIV (human immunodeficiency virus infection)     Hypertension     Personal history of malignant neoplasm of pancreas 9/5/2017    Ulcerative pancolitis without complication 9/5/2017       Past Surgical History:   Procedure Laterality Date    COSMETIC SURGERY      FRACTURE SURGERY      HERNIA REPAIR         Family History   Problem Relation Age of Onset    Melanoma Neg Hx        Social History     Social History    Marital status: Single     Spouse name: N/A    Number of children: N/A    Years of education: N/A     Occupational History    Not on file.     Social History Main Topics    Smoking status: Never Smoker    Smokeless tobacco: Not on file    Alcohol use No    Drug use:     Sexual activity: Not on file     Other Topics Concern    Not on file     Social History Narrative    No narrative on file       Allergies:  Patient has no known allergies.    Medications:    Current Outpatient Prescriptions:     ATRIPLA 600-200-300 mg Tab, TAKE 1 TABLET BY MOUTH EVERY EVENING, Disp: 30 tablet, Rfl: 0    SEROSTIM 6  mg SolR, , Disp: , Rfl:     PHYSICAL EXAMINATION:    The patient generally appears in good health, is appropriately interactive, and is in no apparent distress.     Eyes: anicteric sclerae, moist conjunctivae; no lid-lag; PERRLA     HENT: Atraumatic; oropharynx clear with moist mucous membranes and no mucosal ulcerations;normal hard and soft palate.  No evidence of lymphadenopathy.    Neck: Trachea midline.  No thyromegaly.    Musculoskeletal: No abnormal gait.    Skin: No lesions.    Mental: Cooperative with normal affect.  Is oriented to time, place, and person.    Neuro: Grossly intact.    Chest: Normal inspiratory effort.   No accessory muscles.  No audible wheezes.  Respirations symmetric on inspiration and expiration.    Heart: Regular rhythm.      Abdomen:  Soft, non-tender. No masses or organomegaly. Bladder is not palpable. No evidence of flank discomfort. No evidence of inguinal hernia.    Extremities: No clubbing, cyanosis, or edema      LABS:      No results found for: PSA, PSADIAG, PSATOTAL, PSAFREE, PSAFREEPCT    IMPRESSION:    Encounter Diagnoses   Name Primary?    Fatigue, unspecified type Yes         PLAN:    1. Discussed that his T is normal.  It's not the cause of his fatigue.  2. RTC prn    Copy to:

## 2017-11-16 DIAGNOSIS — Z21 HIV POSITIVE: ICD-10-CM

## 2017-11-16 RX ORDER — EFAVIRENZ, EMTRICITABINE, AND TENOFOVIR DISOPROXIL FUMARATE 600; 200; 300 MG/1; MG/1; MG/1
1 TABLET, FILM COATED ORAL NIGHTLY
Qty: 30 TABLET | Refills: 0 | Status: SHIPPED | OUTPATIENT
Start: 2017-11-16 | End: 2017-11-17 | Stop reason: SDUPTHER

## 2017-11-17 ENCOUNTER — PATIENT MESSAGE (OUTPATIENT)
Dept: INFECTIOUS DISEASES | Facility: CLINIC | Age: 42
End: 2017-11-17

## 2017-11-17 RX ORDER — EFAVIRENZ, EMTRICITABINE AND TENOFOVIR DISOPROXIL FUMARATE 600; 200; 300 MG/1; MG/1; MG/1
1 TABLET, FILM COATED ORAL NIGHTLY
Qty: 30 TABLET | Refills: 0 | Status: SHIPPED | OUTPATIENT
Start: 2017-11-17 | End: 2017-12-13 | Stop reason: SDUPTHER

## 2017-12-13 DIAGNOSIS — Z21 HIV POSITIVE: ICD-10-CM

## 2017-12-14 ENCOUNTER — PATIENT MESSAGE (OUTPATIENT)
Dept: INFECTIOUS DISEASES | Facility: CLINIC | Age: 42
End: 2017-12-14

## 2017-12-14 RX ORDER — EFAVIRENZ, EMTRICITABINE AND TENOFOVIR DISOPROXIL FUMARATE 600; 200; 300 MG/1; MG/1; MG/1
1 TABLET, FILM COATED ORAL NIGHTLY
Qty: 30 TABLET | Refills: 0 | Status: SHIPPED | OUTPATIENT
Start: 2017-12-14 | End: 2018-01-17 | Stop reason: SDUPTHER

## 2018-01-17 DIAGNOSIS — Z21 HIV POSITIVE: ICD-10-CM

## 2018-01-17 RX ORDER — EFAVIRENZ, EMTRICITABINE AND TENOFOVIR DISOPROXIL FUMARATE 600; 200; 300 MG/1; MG/1; MG/1
1 TABLET, FILM COATED ORAL NIGHTLY
Qty: 30 TABLET | Refills: 0 | Status: SHIPPED | OUTPATIENT
Start: 2018-01-17 | End: 2018-01-19 | Stop reason: SDUPTHER

## 2018-01-19 ENCOUNTER — PATIENT MESSAGE (OUTPATIENT)
Dept: INFECTIOUS DISEASES | Facility: CLINIC | Age: 43
End: 2018-01-19

## 2018-01-19 DIAGNOSIS — Z21 HIV POSITIVE: ICD-10-CM

## 2018-01-19 RX ORDER — EFAVIRENZ, EMTRICITABINE AND TENOFOVIR DISOPROXIL FUMARATE 600; 200; 300 MG/1; MG/1; MG/1
1 TABLET, FILM COATED ORAL NIGHTLY
Qty: 30 TABLET | Refills: 0 | Status: SHIPPED | OUTPATIENT
Start: 2018-01-19 | End: 2018-03-07 | Stop reason: SDUPTHER

## 2018-01-22 ENCOUNTER — TELEPHONE (OUTPATIENT)
Dept: PHARMACY | Facility: HOSPITAL | Age: 43
End: 2018-01-22

## 2018-01-22 NOTE — TELEPHONE ENCOUNTER
Invalid number- Hello [facility] received a prescription for Atripla and we will contact their insurance company to find out if the medication is covered. We will update patient of status as more information is received. feel free to give us a call with  any questions at [phone].

## 2018-01-23 NOTE — TELEPHONE ENCOUNTER
DOCUMENTATION ONLY  FYI  Atripla does not require a prior authorization through the patient's insurance.    Copay is 25% of total cost. Claim is a refill too soon until 02/09/2018. Last filled on 01/19/2018 at [facility].     Patient Assistance IS required and is being researched  ANNE

## 2018-01-26 NOTE — TELEPHONE ENCOUNTER
Patient in need of copay assistance, but does not have a working number and has not read Givey message.  MDO messaged for patient's contact information to proceed with benefits investigation.

## 2018-02-09 ENCOUNTER — TELEPHONE (OUTPATIENT)
Dept: PHARMACY | Facility: CLINIC | Age: 43
End: 2018-02-09

## 2018-02-09 ENCOUNTER — PATIENT MESSAGE (OUTPATIENT)
Dept: INFECTIOUS DISEASES | Facility: CLINIC | Age: 43
End: 2018-02-09

## 2018-02-09 NOTE — TELEPHONE ENCOUNTER
Initial Atripla consult completed on . Atripla will be shipped on  to arrive at patient's home on 2/15 via [employer]. $ 0 copay. Address confirmed,CC info confirmed. Confirmed 2 patient identifiers - name and . Therapy Appropriate.     Atripla- Take one tablet by mouth once daily.  Counseling was reviewed:   1. Patient MUST take Atripla at the SAME time every day with food.   2. Side effects include, but not limited to: fatigue/weakness, insomnia, upset stomach, diarrhea, and headaches.   Contact MD if any signs of allergic reaction, kidney problems (weight gain, urine changes, blood in urine), liver problems (dark urine/light stools, jaundice, abdominal pains), depression, bone pain, muscle pain/weakness, change in body fat, and signs of infection.   4. Medication list reviewed. No DDIs or allergies noted. Patient MUST contact myself or provider prior to starting any new OTC, herbal, or prescription drugs to avoid potential DDIs.    -Avoid alcohol   -Space antacids 2 hours before or after Atripla.    This drug does not stop the spread of diseases like HIV that are passed through blood or having sex. Do not have any kind of sex without using a latex or polyurethane condom. Do not share needles or other things like toothbrushes or razors. Talk with your doctor.  Tell your doctor if you are pregnant or plan on getting pregnant. You will need to talk about the benefits and risks of using this drug while you are pregnant.    Discussed the importance of staying well hydrated while on therapy. Compliance stressed - patient to take missed doses as soon as remembered, but NOT to take 2 doses in one day. Patient will report questions or concerns to myself or practitioner. Patient verbalizes understanding. Patient plans to start Atripla on . Consultation included: indication; goals of treatment; administration; storage and handling; side effects; how to handle side effects; the importance of compliance; how to  handle missed doses; the importance of laboratory monitoring; the importance of keeping all follow up appointments.  Patient understands to report any medication changes to [facility] and provider. All questions answered and addressed to patients satisfaction. I will f/u with her in 1 week from start, [facility] to contact patient in 3 weeks for refills.     Burt Stone R.Ph.  Clinical Pharmacist  [facility]  Phone: [phone]

## 2018-02-09 NOTE — TELEPHONE ENCOUNTER
Atripla initial consultation attempted.  No answer.  [facility] for call back.  [employer] message sent.

## 2018-02-09 NOTE — TELEPHONE ENCOUNTER
Patient previously enrolled in Patient [facility] ([phone]), called and got processing info.   BIN: [id number]   PCN: [id number]   ID: [id number]   GRP: [id number].

## 2018-02-19 ENCOUNTER — PATIENT MESSAGE (OUTPATIENT)
Dept: INFECTIOUS DISEASES | Facility: CLINIC | Age: 43
End: 2018-02-19

## 2018-02-21 ENCOUNTER — TELEPHONE (OUTPATIENT)
Dept: PHARMACY | Facility: CLINIC | Age: 43
End: 2018-02-21

## 2018-02-27 NOTE — TELEPHONE ENCOUNTER
3rd attempt for Atripla follow up.  No answer.  LVM for call back.  [employer] message has been read.  Will pend follow up to next refill activity.

## 2018-03-01 ENCOUNTER — PATIENT MESSAGE (OUTPATIENT)
Dept: PHARMACY | Facility: CLINIC | Age: 43
End: 2018-03-01

## 2018-03-01 NOTE — TELEPHONE ENCOUNTER
Initial clinical follow-up conducted for Atripla.  Name/ confirmed. Patient reached through E96.   Patient confirms taking Atripla with no difficulties.  Patient denies skipping or missing doses. Patient reports experiencing no side effects since beginning therapy.   Patient reports no new medications, otc remedies, or allergies.       He reports not feeling because several doses of Serostim was missed due to copay costs.  Serostim is being filled by an outside pharmacy.  Informed patient of possible financial assistance from [employer] for LIS.  Advised to call [facility] and provider if any issues arise.

## 2018-03-07 DIAGNOSIS — Z21 HIV POSITIVE: ICD-10-CM

## 2018-03-08 RX ORDER — EFAVIRENZ, EMTRICITABINE, AND TENOFOVIR DISOPROXIL FUMARATE 600; 200; 300 MG/1; MG/1; MG/1
TABLET, FILM COATED ORAL
Qty: 30 TABLET | Refills: 0 | Status: SHIPPED | OUTPATIENT
Start: 2018-03-08 | End: 2018-03-19 | Stop reason: SDUPTHER

## 2018-03-15 ENCOUNTER — TELEPHONE (OUTPATIENT)
Dept: PHARMACY | Facility: CLINIC | Age: 43
End: 2018-03-15

## 2018-03-19 DIAGNOSIS — Z21 HIV POSITIVE: ICD-10-CM

## 2018-03-19 RX ORDER — EFAVIRENZ, EMTRICITABINE AND TENOFOVIR DISOPROXIL FUMARATE 600; 200; 300 MG/1; MG/1; MG/1
1 TABLET, FILM COATED ORAL NIGHTLY
Qty: 30 TABLET | Refills: 0 | Status: SHIPPED | OUTPATIENT
Start: 2018-03-19 | End: 2018-04-27 | Stop reason: SDUPTHER

## 2018-03-20 ENCOUNTER — TELEPHONE (OUTPATIENT)
Dept: PHARMACY | Facility: CLINIC | Age: 43
End: 2018-03-20

## 2018-03-27 ENCOUNTER — LAB VISIT (OUTPATIENT)
Dept: LAB | Facility: HOSPITAL | Age: 43
End: 2018-03-27
Attending: INTERNAL MEDICINE
Payer: MEDICARE

## 2018-03-27 ENCOUNTER — OFFICE VISIT (OUTPATIENT)
Dept: INFECTIOUS DISEASES | Facility: CLINIC | Age: 43
End: 2018-03-27
Payer: MEDICARE

## 2018-03-27 VITALS
BODY MASS INDEX: 26.2 KG/M2 | DIASTOLIC BLOOD PRESSURE: 93 MMHG | HEART RATE: 102 BPM | TEMPERATURE: 98 F | WEIGHT: 215.19 LBS | HEIGHT: 76 IN | SYSTOLIC BLOOD PRESSURE: 158 MMHG

## 2018-03-27 DIAGNOSIS — R21 RASH, SKIN: ICD-10-CM

## 2018-03-27 DIAGNOSIS — R79.89 OTHER SPECIFIED ABNORMAL FINDINGS OF BLOOD CHEMISTRY: ICD-10-CM

## 2018-03-27 DIAGNOSIS — Z21 HIV POSITIVE: ICD-10-CM

## 2018-03-27 DIAGNOSIS — K51.00 ULCERATIVE PANCOLITIS WITHOUT COMPLICATION: ICD-10-CM

## 2018-03-27 DIAGNOSIS — B20 HUMAN IMMUNODEFICIENCY VIRUS (HIV) DISEASE: ICD-10-CM

## 2018-03-27 DIAGNOSIS — R63.4 WEIGHT LOSS, NON-INTENTIONAL: Primary | ICD-10-CM

## 2018-03-27 DIAGNOSIS — B20 HIV DISEASE RESULTING IN WASTING SYNDROME: ICD-10-CM

## 2018-03-27 DIAGNOSIS — R63.4 WEIGHT LOSS, NON-INTENTIONAL: ICD-10-CM

## 2018-03-27 DIAGNOSIS — E88.A HIV DISEASE RESULTING IN WASTING SYNDROME: ICD-10-CM

## 2018-03-27 LAB
ALBUMIN SERPL BCP-MCNC: 4.3 G/DL
ALP SERPL-CCNC: 127 U/L
ALT SERPL W/O P-5'-P-CCNC: 23 U/L
ANION GAP SERPL CALC-SCNC: 7 MMOL/L
AST SERPL-CCNC: 29 U/L
BASOPHILS # BLD AUTO: 0.03 K/UL
BASOPHILS NFR BLD: 0.3 %
BILIRUB SERPL-MCNC: 0.5 MG/DL
BUN SERPL-MCNC: 12 MG/DL
CALCIUM SERPL-MCNC: 9.8 MG/DL
CD3+CD4+ CELLS # BLD: 809 CELLS/UL (ref 300–1400)
CD3+CD4+ CELLS NFR BLD: 34.3 % (ref 28–57)
CHLORIDE SERPL-SCNC: 102 MMOL/L
CHOLEST SERPL-MCNC: 174 MG/DL
CHOLEST/HDLC SERPL: 3.8 {RATIO}
CO2 SERPL-SCNC: 31 MMOL/L
CREAT SERPL-MCNC: 1.1 MG/DL
CRP SERPL-MCNC: 0.5 MG/L
DIFFERENTIAL METHOD: ABNORMAL
EOSINOPHIL # BLD AUTO: 0.1 K/UL
EOSINOPHIL NFR BLD: 1.5 %
ERYTHROCYTE [DISTWIDTH] IN BLOOD BY AUTOMATED COUNT: 14.8 %
ERYTHROCYTE [SEDIMENTATION RATE] IN BLOOD BY WESTERGREN METHOD: 0 MM/HR
EST. GFR  (AFRICAN AMERICAN): >60 ML/MIN/1.73 M^2
EST. GFR  (NON AFRICAN AMERICAN): >60 ML/MIN/1.73 M^2
GLUCOSE SERPL-MCNC: 106 MG/DL
HCT VFR BLD AUTO: 51.1 %
HDLC SERPL-MCNC: 46 MG/DL
HDLC SERPL: 26.4 %
HGB BLD-MCNC: 17 G/DL
IMM GRANULOCYTES # BLD AUTO: 0.02 K/UL
IMM GRANULOCYTES NFR BLD AUTO: 0.2 %
LDLC SERPL CALC-MCNC: 104.4 MG/DL
LIPASE SERPL-CCNC: 32 U/L
LYMPHOCYTES # BLD AUTO: 2.2 K/UL
LYMPHOCYTES NFR BLD: 24.9 %
MCH RBC QN AUTO: 31.4 PG
MCHC RBC AUTO-ENTMCNC: 33.3 G/DL
MCV RBC AUTO: 95 FL
MONOCYTES # BLD AUTO: 0.6 K/UL
MONOCYTES NFR BLD: 6.8 %
NEUTROPHILS # BLD AUTO: 5.9 K/UL
NEUTROPHILS NFR BLD: 66.3 %
NONHDLC SERPL-MCNC: 128 MG/DL
NRBC BLD-RTO: 0 /100 WBC
PLATELET # BLD AUTO: 249 K/UL
PMV BLD AUTO: 8.8 FL
POTASSIUM SERPL-SCNC: 4.1 MMOL/L
PROT SERPL-MCNC: 7.9 G/DL
RBC # BLD AUTO: 5.41 M/UL
SODIUM SERPL-SCNC: 140 MMOL/L
TRIGL SERPL-MCNC: 118 MG/DL
WBC # BLD AUTO: 8.86 K/UL

## 2018-03-27 PROCEDURE — 85651 RBC SED RATE NONAUTOMATED: CPT

## 2018-03-27 PROCEDURE — 99213 OFFICE O/P EST LOW 20 MIN: CPT | Mod: PBBFAC | Performed by: INTERNAL MEDICINE

## 2018-03-27 PROCEDURE — 36415 COLL VENOUS BLD VENIPUNCTURE: CPT

## 2018-03-27 PROCEDURE — 82380 ASSAY OF CAROTENE: CPT

## 2018-03-27 PROCEDURE — 99999 PR PBB SHADOW E&M-EST. PATIENT-LVL III: CPT | Mod: PBBFAC,,, | Performed by: INTERNAL MEDICINE

## 2018-03-27 PROCEDURE — 80053 COMPREHEN METABOLIC PANEL: CPT

## 2018-03-27 PROCEDURE — 87536 HIV-1 QUANT&REVRSE TRNSCRPJ: CPT

## 2018-03-27 PROCEDURE — 85025 COMPLETE CBC W/AUTO DIFF WBC: CPT

## 2018-03-27 PROCEDURE — 86140 C-REACTIVE PROTEIN: CPT

## 2018-03-27 PROCEDURE — 80061 LIPID PANEL: CPT

## 2018-03-27 PROCEDURE — 86361 T CELL ABSOLUTE COUNT: CPT

## 2018-03-27 PROCEDURE — 99214 OFFICE O/P EST MOD 30 MIN: CPT | Mod: S$PBB,,, | Performed by: INTERNAL MEDICINE

## 2018-03-27 PROCEDURE — 83690 ASSAY OF LIPASE: CPT

## 2018-03-27 RX ORDER — NAPROXEN SODIUM 220 MG
TABLET ORAL
COMMUNITY
Start: 2018-03-23 | End: 2021-05-28 | Stop reason: SDUPTHER

## 2018-03-27 NOTE — PROGRESS NOTES
Subjective:      Patient ID: Moisés Koehler is a 42 y.o. male.    Chief Complaint:Hospital Follow Up      History of Present Illness    Losing weight again. Has 4 to 6 bowel movements per day, sometimes loose, sometimes soft. Usually in the morning. Never has constipation. No blood in stool, unless hemorrhoid is bad. Has a diagnosis of UC, but has never wanted treatment.    Just starting Serostim again. Has payment issues with medicare.     Never stopped Atripla.    Wants to know what       Review of Systems   Constitution: Positive for weight loss. Negative for chills, decreased appetite, fever, weakness, malaise/fatigue, night sweats and weight gain.   HENT: Negative for congestion, ear pain, hearing loss, hoarse voice, sore throat and tinnitus.    Eyes: Negative for blurred vision, redness and visual disturbance.   Cardiovascular: Negative for chest pain, leg swelling and palpitations.   Respiratory: Negative for cough, hemoptysis, shortness of breath and sputum production.    Hematologic/Lymphatic: Negative for adenopathy. Does not bruise/bleed easily.   Skin: Negative for dry skin, itching, rash and suspicious lesions.   Musculoskeletal: Negative for back pain, joint pain, myalgias and neck pain.   Gastrointestinal: Positive for heartburn. Negative for abdominal pain, constipation, diarrhea, nausea and vomiting.   Genitourinary: Negative for dysuria, flank pain, frequency, hematuria, hesitancy and urgency.   Neurological: Negative for dizziness, headaches, numbness and paresthesias.   Psychiatric/Behavioral: Negative for depression and memory loss. The patient does not have insomnia and is not nervous/anxious.      Objective:   Physical Exam   Constitutional: He is oriented to person, place, and time. He appears cachectic.   HENT:   Head: Normocephalic and atraumatic.   Eyes: Conjunctivae and EOM are normal. Pupils are equal, round, and reactive to light.   Neck: Normal range of motion. Neck supple.    Cardiovascular: Normal rate, regular rhythm and normal heart sounds.    Pulmonary/Chest: Effort normal and breath sounds normal.   Abdominal: Soft. Bowel sounds are normal. He exhibits no distension and no pulsatile midline mass. There is no hepatosplenomegaly. There is no tenderness. There is no rigidity and no rebound.   Musculoskeletal: Normal range of motion. He exhibits no edema.   Neurological: He is alert and oriented to person, place, and time.   Skin: Skin is warm and dry. Rash noted.        Nursing note and vitals reviewed.    Assessment:       1. Weight loss, non-intentional    2. 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 Rash, skin    4. Ulcerative pancolitis without complication    5. Other specified abnormal findings of blood chemistry     6. Human immunodeficiency virus (HIV) disease     7. HIV positive          Plan:       Continue Atripla for HIV, check HIV parameters.  Continue Serostim for HIV wasting  Evaluate for malabsorption  Check ESR and CRP for inflammation-patient has a history of ulcerative colitis.  Refer to GI for possible ulcerative colitis evaluation.  Evaluate lipid profile for malabsorption and cholesterol screening.   Consider dermatology evaluation of skin rash.

## 2018-03-28 ENCOUNTER — PATIENT MESSAGE (OUTPATIENT)
Dept: INFECTIOUS DISEASES | Facility: CLINIC | Age: 43
End: 2018-03-28

## 2018-03-29 LAB — CAROTENE SERPL-MCNC: 36 UG/DL (ref 60–200)

## 2018-04-16 ENCOUNTER — PATIENT MESSAGE (OUTPATIENT)
Dept: INFECTIOUS DISEASES | Facility: CLINIC | Age: 43
End: 2018-04-16

## 2018-04-17 ENCOUNTER — TELEPHONE (OUTPATIENT)
Dept: PHARMACY | Facility: CLINIC | Age: 43
End: 2018-04-17

## 2018-04-27 DIAGNOSIS — Z21 HIV POSITIVE: ICD-10-CM

## 2018-04-27 RX ORDER — EFAVIRENZ, EMTRICITABINE AND TENOFOVIR DISOPROXIL FUMARATE 600; 200; 300 MG/1; MG/1; MG/1
TABLET, FILM COATED ORAL
Qty: 30 TABLET | Refills: 0 | Status: SHIPPED | OUTPATIENT
Start: 2018-04-27 | End: 2018-06-06

## 2018-05-14 ENCOUNTER — TELEPHONE (OUTPATIENT)
Dept: PHARMACY | Facility: CLINIC | Age: 43
End: 2018-05-14

## 2018-05-15 ENCOUNTER — PATIENT MESSAGE (OUTPATIENT)
Dept: ADMINISTRATIVE | Facility: OTHER | Age: 43
End: 2018-05-15

## 2018-05-31 ENCOUNTER — PATIENT MESSAGE (OUTPATIENT)
Dept: INFECTIOUS DISEASES | Facility: CLINIC | Age: 43
End: 2018-05-31

## 2018-06-06 DIAGNOSIS — Z21 HIV POSITIVE: ICD-10-CM

## 2018-06-07 ENCOUNTER — TELEPHONE (OUTPATIENT)
Dept: PHARMACY | Facility: CLINIC | Age: 43
End: 2018-06-07

## 2018-06-07 RX ORDER — EFAVIRENZ, EMTRICITABINE AND TENOFOVIR DISOPROXIL FUMARATE 600; 200; 300 MG/1; MG/1; MG/1
TABLET, FILM COATED ORAL
Qty: 30 TABLET | Refills: 0 | Status: SHIPPED | OUTPATIENT
Start: 2018-06-07 | End: 2018-07-14 | Stop reason: SDUPTHER

## 2018-06-07 NOTE — TELEPHONE ENCOUNTER
Attempted to contact patient from refill and follow up of Atripla.  Patient's phone number is not in service; will send [employer] message to patient.      Tio Rodriguez, PharmD  Clinical Pharmacist  [facility]  [phone]

## 2018-06-11 NOTE — TELEPHONE ENCOUNTER
Attempted to contact patient for refill and follow up for Atripla.  Unable to leave message.  Patient has read netomathart message but has not responded yet.  Will continue to try to f/u with patient.    Tio Rodriguez, PharmD  Clinical Pharmacist  [facility]  [phone]

## 2018-06-15 NOTE — TELEPHONE ENCOUNTER
Attempted to contact patient for refill/followup Atripla.  Unable to contact patient - LVM.  Patient has read [employer] message but hasn't responded.  Will send postcard in attempt to contact patient.    Tio Rodriguez, PharmD  Clinical Pharmacist  [facility]  [phone]

## 2018-06-22 NOTE — TELEPHONE ENCOUNTER
Atripla refill confirmed.  Patient plans on picking up Atripla by .   $0 copay- 004. Confirmed 2 patient identifiers - name and .      Patient reports taking Atripla routinely and has about 2 doses on hand.  No side effects noted.  No missed doses, no new medications, no new allergies or health conditions reported at this time.  Patient unable to complete follow up at this time; will attempt follow up with next refill. All questions answered and addressed to patients satisfaction. Pt verbalized understanding.    Tio Rodriguez, PharmD  Clinical Pharmacist  [facility]  [phone]

## 2018-07-02 RX ORDER — SOMATROPIN 6 MG
KIT SUBCUTANEOUS
OUTPATIENT
Start: 2018-07-02

## 2018-07-13 DIAGNOSIS — Z21 HIV POSITIVE: ICD-10-CM

## 2018-07-14 RX ORDER — EFAVIRENZ, EMTRICITABINE AND TENOFOVIR DISOPROXIL FUMARATE 600; 200; 300 MG/1; MG/1; MG/1
TABLET, FILM COATED ORAL
Qty: 30 TABLET | Refills: 0 | Status: SHIPPED | OUTPATIENT
Start: 2018-07-14 | End: 2018-07-23 | Stop reason: SDUPTHER

## 2018-07-17 ENCOUNTER — TELEPHONE (OUTPATIENT)
Dept: PHARMACY | Facility: CLINIC | Age: 43
End: 2018-07-17

## 2018-07-17 NOTE — TELEPHONE ENCOUNTER
Contacting patient for refill and follow up on Atripla via [employer].     MATT Fish.Ph.  Clinical Pharmacist  [facility]  Phone: [phone]

## 2018-07-19 NOTE — TELEPHONE ENCOUNTER
Patient called for refill readiness and follow up on Atripla.  No answer - lvm for call back.     Burt Stone, MATT.Ph.  Clinical Pharmacist  [facility]  Phone: [phone]

## 2018-07-20 ENCOUNTER — PATIENT MESSAGE (OUTPATIENT)
Dept: ADMINISTRATIVE | Facility: OTHER | Age: 43
End: 2018-07-20

## 2018-07-20 NOTE — TELEPHONE ENCOUNTER
Refill readiness for Atripla confirmed with patient; name/ confirmed; no missed doses; no new medications; no side effects noted; address confirmed for  shipment and  delivery    MATT Fish.Ph.  Clinical Pharmacist  [facility]  Phone: [phone]

## 2018-07-23 ENCOUNTER — PATIENT MESSAGE (OUTPATIENT)
Dept: INFECTIOUS DISEASES | Facility: CLINIC | Age: 43
End: 2018-07-23

## 2018-07-23 DIAGNOSIS — Z21 HIV POSITIVE: ICD-10-CM

## 2018-07-23 RX ORDER — EFAVIRENZ, EMTRICITABINE AND TENOFOVIR DISOPROXIL FUMARATE 600; 200; 300 MG/1; MG/1; MG/1
TABLET, FILM COATED ORAL
Qty: 30 TABLET | Refills: 0 | Status: SHIPPED | OUTPATIENT
Start: 2018-07-23 | End: 2018-11-26 | Stop reason: SDUPTHER

## 2018-08-03 RX ORDER — SOMATROPIN 6 MG
KIT SUBCUTANEOUS
Status: CANCELLED | OUTPATIENT
Start: 2018-08-03

## 2018-08-13 DIAGNOSIS — Z21 HIV POSITIVE: ICD-10-CM

## 2018-08-13 RX ORDER — EFAVIRENZ, EMTRICITABINE AND TENOFOVIR DISOPROXIL FUMARATE 600; 200; 300 MG/1; MG/1; MG/1
TABLET, FILM COATED ORAL
Qty: 30 TABLET | Refills: 0 | Status: SHIPPED | OUTPATIENT
Start: 2018-08-13 | End: 2018-09-11

## 2018-08-14 ENCOUNTER — TELEPHONE (OUTPATIENT)
Dept: PHARMACY | Facility: CLINIC | Age: 43
End: 2018-08-14

## 2018-09-11 DIAGNOSIS — Z21 HIV POSITIVE: ICD-10-CM

## 2018-09-11 RX ORDER — EFAVIRENZ, EMTRICITABINE AND TENOFOVIR DISOPROXIL FUMARATE 600; 200; 300 MG/1; MG/1; MG/1
TABLET, FILM COATED ORAL
Qty: 30 TABLET | Refills: 0 | Status: SHIPPED | OUTPATIENT
Start: 2018-09-11 | End: 2018-10-04

## 2018-09-12 ENCOUNTER — TELEPHONE (OUTPATIENT)
Dept: PHARMACY | Facility: CLINIC | Age: 43
End: 2018-09-12

## 2018-10-04 DIAGNOSIS — Z21 HIV POSITIVE: ICD-10-CM

## 2018-10-04 RX ORDER — EFAVIRENZ, EMTRICITABINE AND TENOFOVIR DISOPROXIL FUMARATE 600; 200; 300 MG/1; MG/1; MG/1
TABLET, FILM COATED ORAL
Qty: 30 TABLET | Refills: 0 | Status: SHIPPED | OUTPATIENT
Start: 2018-10-04 | End: 2018-10-12

## 2018-10-12 ENCOUNTER — PATIENT MESSAGE (OUTPATIENT)
Dept: ADMINISTRATIVE | Facility: OTHER | Age: 43
End: 2018-10-12

## 2018-10-12 DIAGNOSIS — Z21 HIV POSITIVE: ICD-10-CM

## 2018-10-12 RX ORDER — EFAVIRENZ, EMTRICITABINE AND TENOFOVIR DISOPROXIL FUMARATE 600; 200; 300 MG/1; MG/1; MG/1
TABLET, FILM COATED ORAL
Qty: 30 TABLET | Refills: 0 | Status: SHIPPED | OUTPATIENT
Start: 2018-10-12 | End: 2018-12-05

## 2018-10-29 ENCOUNTER — PATIENT MESSAGE (OUTPATIENT)
Dept: ADMINISTRATIVE | Facility: OTHER | Age: 43
End: 2018-10-29

## 2018-11-08 NOTE — TELEPHONE ENCOUNTER
Patient called for refill readiness and follow up on Atripla - after his requesting a call back in 1 week.  No answer - lvm for call back.     MATT Fish.Ph., AAHIVP  Clinical Pharmacist, HIV/HCV  [facility]  Phone: [phone]

## 2018-11-13 NOTE — TELEPHONE ENCOUNTER
Refill readiness for Atripla confirmed with patient; name/ confirmed; no missed doses; no new medications; no side effects noted; address confirmed for  shipment and 11/15 delivery    MATT Fish.Ph., AAHIVP  Clinical Pharmacist, HIV/HCV  [facility]  Phone: [phone]

## 2018-11-14 ENCOUNTER — TELEPHONE (OUTPATIENT)
Dept: ORTHOPEDICS | Facility: CLINIC | Age: 43
End: 2018-11-14

## 2018-11-14 NOTE — TELEPHONE ENCOUNTER
Called and Informed patient of appointment on 11/29/18 with sport meds/ appointment mailed.  Pt stated A.C. joint left shoulder chronic pain loss of mobility/injury about 8 yrs ago. Pt stated that he want to wait.

## 2018-11-19 ENCOUNTER — OFFICE VISIT (OUTPATIENT)
Dept: INFECTIOUS DISEASES | Facility: CLINIC | Age: 43
End: 2018-11-19
Payer: MEDICARE

## 2018-11-19 VITALS
SYSTOLIC BLOOD PRESSURE: 159 MMHG | TEMPERATURE: 99 F | DIASTOLIC BLOOD PRESSURE: 88 MMHG | WEIGHT: 220.44 LBS | BODY MASS INDEX: 26.84 KG/M2 | HEART RATE: 83 BPM

## 2018-11-19 DIAGNOSIS — M25.512 CHRONIC LEFT SHOULDER PAIN: ICD-10-CM

## 2018-11-19 DIAGNOSIS — B20 HIV DISEASE RESULTING IN WASTING SYNDROME: ICD-10-CM

## 2018-11-19 DIAGNOSIS — E88.A HIV DISEASE RESULTING IN WASTING SYNDROME: ICD-10-CM

## 2018-11-19 DIAGNOSIS — G89.29 CHRONIC LEFT SHOULDER PAIN: ICD-10-CM

## 2018-11-19 DIAGNOSIS — K64.0 FIRST DEGREE HEMORRHOIDS: ICD-10-CM

## 2018-11-19 DIAGNOSIS — K51.00 ULCERATIVE PANCOLITIS WITHOUT COMPLICATION: ICD-10-CM

## 2018-11-19 DIAGNOSIS — Z21 HIV POSITIVE: Primary | ICD-10-CM

## 2018-11-19 PROCEDURE — 99213 OFFICE O/P EST LOW 20 MIN: CPT | Mod: PBBFAC | Performed by: INTERNAL MEDICINE

## 2018-11-19 PROCEDURE — 99214 OFFICE O/P EST MOD 30 MIN: CPT | Mod: S$PBB,,, | Performed by: INTERNAL MEDICINE

## 2018-11-19 PROCEDURE — 99999 PR PBB SHADOW E&M-EST. PATIENT-LVL III: CPT | Mod: PBBFAC,,, | Performed by: INTERNAL MEDICINE

## 2018-11-19 NOTE — PROGRESS NOTES
Subjective:      Patient ID: Moisés Koehler is a 43 y.o. male.    Chief Complaint:Follow-up      History of Present Illness    Patient states that he has significant diarrhea.  He states that he will eat corn and see it in his stool few hours later.  He states that he carries a diagnosis of ulcerative colitis, but has not seen a gastroenterologist in over 10 years.  He has not had a colonoscopy in that period of time either.  He also complains of hemorrhoids from his frequent bowel movements, up to 10 times per day.  His stools are loose, and fill with mucus.  He denies having blood except when hemorrhoids are active.  He states that he only sees bright red blood.  He does not have nausea or vomiting.  He has a persistent, nagging subumbilical abdominal pain, more on the right side than the left.  He states that sometimes he does not feel like eating, due to this dull pain.    The rash on his left leg continues to itch.  It is growing larger with the increase in his gastrointestinal symptoms.    He is seeing a orthopedic surgeon for his the pain in his shoulder.  He does not have any significant joint effusions or tenderness.  The shoulder and knee are longstanding issues due to sports injuries.    His last carotene level suggested some level of malabsorption.  He is gaining back weight with serostim and testosterone replacement.    Review of Systems   Constitution: Positive for decreased appetite, weakness, malaise/fatigue and weight loss. Negative for chills, fever, night sweats and weight gain.   HENT: Negative for congestion, ear pain, hearing loss, hoarse voice, sore throat and tinnitus.    Eyes: Negative for blurred vision, redness and visual disturbance.   Cardiovascular: Negative for chest pain, leg swelling and palpitations.   Respiratory: Negative for cough, hemoptysis, shortness of breath and sputum production.    Hematologic/Lymphatic: Negative for adenopathy. Bruises/bleeds easily.   Skin: Positive  for itching and suspicious lesions. Negative for dry skin and rash.   Musculoskeletal: Negative for back pain, joint pain, myalgias and neck pain.   Gastrointestinal: Positive for abdominal pain, constipation, diarrhea and heartburn. Negative for nausea and vomiting.   Genitourinary: Negative for dysuria, flank pain, frequency, hematuria, hesitancy and urgency.   Neurological: Negative for dizziness, headaches, numbness and paresthesias.   Psychiatric/Behavioral: Negative for depression and memory loss. The patient is nervous/anxious. The patient does not have insomnia.      Objective:   Physical Exam   Constitutional: He appears well-developed and well-nourished.   HENT:   Head: Normocephalic and atraumatic.   Eyes: Conjunctivae and EOM are normal. Pupils are equal, round, and reactive to light. No scleral icterus.   Cardiovascular: Normal rate, regular rhythm and normal heart sounds.   Pulmonary/Chest: Effort normal and breath sounds normal.   Abdominal: Soft. Bowel sounds are normal. There is no hepatosplenomegaly. There is tenderness (Mild tenderness in the lower abdominal quadrants, worse on the right than left) in the right lower quadrant and left lower quadrant. There is no rigidity, no rebound, no guarding and no tenderness at McBurney's point.   Musculoskeletal: Normal range of motion. He exhibits no edema.   Skin: Skin is dry. Rash (Plaque like lichenified rash on the left lower leg) noted.   Nursing note and vitals reviewed.    Assessment:       1. HIV positive    2. 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 Ulcerative pancolitis without complication    4. First degree hemorrhoids    5. Chronic left shoulder pain          Plan:     Check HIV parameters today.  Continue current anti-retroviral medication.  He has been compliant with his medications.  I do not think that the malabsorption is related to his anti-retroviral medication.  If his CD4 count is above 200, I do not have any contraindications  for any procedures or surgery that is considered.  Refer to Gastroenterology for possible Crohn's disease.  The psoriasis like rash on his left leg may reflect extraintestinal manifestations of Crohn's.  I will recheck his carotene and lipase levels.  I will also check antibodies for Giardia and Cryptosporidium today.  Refer to Dermatology for his plaque-like psoriasis lesions on his left leg.  These most likely should be biopsied.  That may provide some clue to the intestinal symptoms as well.  Continue Serostim and Testim for HIV related weight loss.

## 2018-11-20 ENCOUNTER — PATIENT MESSAGE (OUTPATIENT)
Dept: INFECTIOUS DISEASES | Facility: CLINIC | Age: 43
End: 2018-11-20

## 2018-11-20 ENCOUNTER — TELEPHONE (OUTPATIENT)
Dept: GASTROENTEROLOGY | Facility: CLINIC | Age: 43
End: 2018-11-20

## 2018-11-20 NOTE — TELEPHONE ENCOUNTER
Pt is scheduled for a clinic appt with Dr. ROSA Chawla on 11/26/2018.    E-mail sent to pt with appt reminder, new patient welcome letter, as well as a campus map.

## 2018-11-20 NOTE — H&P (VIEW-ONLY)
[facility]          Inflammatory Bowel Disease New Patient Consultation Note         TODAY'S VISIT DATE:  11/20/2018    Reason for Consult:    Possible ulcerative colitis    PCP: Primary Doctor No      Referring MD:   Dr. Smooth Millan    History of Present Illness:    Dear. Dr. Smooth Millan    Thank you for requesting a consultation on your patient Moisés Koehler who is a 43 y.o. male seen today at the [facility] on 11/20/2018 for possible history of ulcerative colitis  Pertinent past medical history includes HIV, GERD, history of cellulitis (1/2017).     As you know, Moisés Koehler has HIV (absolute CD4 count 907 an dundetectal HIV viral load on 11/19/18).  Patient was diagnosed with HIV in 2010 and is currently treated with atripla under the care of Dr. Millan since 2015. He takes serostim (growth hormone) that has helped hiim gain weight. Prior to the diagnosis of HIV in 2008 when he developed lower abdominal cramping, diarrhea.  He had a colonoscopy and diagnosed with ulcerative colitis but he recalls that it was only a portion of the colon that was involved. He recalls getting colazal and had worsening abdominal pain and diarrhea within a week of taking them and he stopped within a month due to not feeling well. He had ongoing stable bowel movements.      Patient has had chronic GERD and has had symptoms of epigastric pain in the past which has been treated with nexium.  He has also had some diarrhea and flatulence in 2015 that responded to creon.   In 3/2018 when he saw Dr. Millan he reported weight loss and frequent soft to loose BMs but notes indicate that he never wanted to start treatment for his UC. There was a plan for referral to GI then but not sure why this did not happen. At his visit on 11/19/18 with Dr. Millan he reported significant diarrhea up to 10 Bms/day and notices fast transit and sometimes undigested  food in his stool.  He also reported hemorrhoids.  He saw mucus but no blood in mucus. He does report BRBPR at times that he attributes to active hemorrhoids. He also reported persistent, nagging subumbilical abdominal pain (R>L) and sometimes does not feel like eating due to this dull pain. He also had a pruritic rash that correlated with worsening GI symptoms per his report.  He had carotene levels suggesting some level of malabsorption. Stool giardia/crypto on 2018 was negative, lipase elevated at 274 (normal <60).      IN 10/2018 he describes symptoms more consistent with tenesmus.  Some weight loss and low appetite and this has worsening over the past 5 months. Currently patient is on no IBD meds and is having 4-6 soft to formed BMs/day with no nocturnal bowel movements and with frequency occasionally will see blood in the stool. Intermittent worsening diarrhea with worsening hemorrhoids with rectal pain with blood in stool.  He has a rash LLE that is pruritic (steroid creams, holistic ointments with some improvement but not resolution). He is taking nexium for heartburn but takes this inconsistently 5 times/week and not taking it every morning as needed. Has some anxiety. He has joint pains of all joints.     Patient has no other gastrointestinal/constitutional complaints including no fevers or chills, nausea/vomiting (including no hematemesis), dysphagia. Also patient does not have any extraintestinal manifestations of their inflammatory bowel disease including no eye pain/redness, skin lesions/rashes, joint problems, oral ulcers.    Prior Pertinent Surgeries:   2016 L Irrigation and debridement of RUE    Pertinent Endoscopy/Imagin2015 US Abdomen: splenomegaly, attenuating hepatic parenchyma with elevated HRI, suggestive of greater than 5% steatosis     Pertinent Labs:  Lab Results   Component Value Date    SEDRATE 5 2018    CRP 1.7 2018     Lab Results   Component Value Date     HEPCAB Negative 11/19/2018     Therapeutic Drug Monitoring Labs:  NA    Prior IBD Therapies:  Atripla  Nexium  Creon  Proctofoam     Current IBD/HIV Therapies:  Atripla  Nexium  Serostim    Vaccinations:  Influenza (inactive): 2017, recommended yearly  Pneumococcal PCV 13: UTD per patient   Pneumococcal PCV 23: UTD per patient  Tetanus (TdaP): 2/13/17  HPV (males and females ages 19-27 yo):        Meningococcal (risk factors- complement component deficiency, spleen damage or splenectomy, HIV, traveling to endemic areas, college student residing in residence weaver,  recruits): recommended   Hepatitis B: recommended if no immunity  No results found for: HEPBSAB   Hepatitis A (risk factors- traveling to high endemic areas, chronic liver disease, clotting factor disorders, MSM, illicit drug users): recommended if no immunity  No results found for: HEPAIGG  MMR (live vaccine):      Chickenpox status/Varicella (live vaccine): had a chicken pox as a child  No results found for: VARICELLAZOS, VARICELLAINT  Zoster (age >51 yo, live vaccine): Shingrix series recommended     NSAID use/indication:  Ibuprofen once a week for shoulder pain    Narcotic use:  No    Alternative/Complementary Meds for IBD:  Creatine, amino acids prior to working     Review of Systems   Constitutional: Negative for chills, fever and weight loss.   HENT:        No oral ulcers, dysphagia, oral thrush   Eyes: Negative for blurred vision, pain and redness.   Respiratory: Negative for cough and shortness of breath.    Cardiovascular: Negative for chest pain.   Gastrointestinal: Positive for abdominal pain and heartburn. Negative for nausea and vomiting.   Genitourinary: Negative for dysuria and hematuria.   Musculoskeletal: Negative for back pain and joint pain.   Skin: Positive for rash.   Psychiatric/Behavioral: Negative for depression. The patient is nervous/anxious. The patient does not have insomnia.      Medical/Surgical History:    has a past  "medical history of AC joint dislocation, Anxiety, Arthritis, Atrial fibrillation, GERD (gastroesophageal reflux disease), HIV (human immunodeficiency virus infection), Hypertension, Personal history of malignant neoplasm of pancreas, and Ulcerative pancolitis without complication.   has a past surgical history that includes Cosmetic surgery; Hernia repair; Fracture surgery; and INCISION AND DRAINAGE (I&D), ARM (Right, 12/27/2016).    Family History:   family history includes Lung cancer in his maternal grandmother; Stomach cancer in his mother.    Social History:    reports that  has never smoked. he has never used smokeless tobacco. He reports that he uses drugs. He reports that he does not drink alcohol.    Review of patient's allergies indicates:  No Known Allergies    Current Medications:   Outpatient Medications Marked as Taking for the 11/26/18 encounter (Office Visit) with Shaniqua Chawla MD   Medication Sig Dispense Refill    efavirenz-emtrictabine-tenofovir 600-200-300 mg (ATRIPLA) 600-200-300 mg Tab TAKE ONE TABLET BY MOUTH EVERY EVENING 30 tablet 0    esomeprazole (NEXIUM) 20 MG capsule Take 20 mg by mouth before breakfast.      ibuprofen (ADVIL,MOTRIN) 200 MG tablet Take 400 mg by mouth every 6 (six) hours as needed for Pain (Pt takes once a week, rare).      SEROSTIM 6 mg SolR        Vital Signs:  BP (!) 157/91 (BP Location: Right arm, Patient Position: Sitting) Comment (BP Location): auto  Pulse 96 Comment: 96%  Temp 97.7 °F (36.5 °C)   Ht 6' 3" (1.905 m)   Wt 98.9 kg (218 lb 0.6 oz)   BMI 27.25 kg/m²   Physical Exam   Constitutional: He is oriented to person, place, and time. He appears well-developed.   HENT:   Mouth/Throat: Oropharynx is clear and moist. No oral lesions.   Eyes: Conjunctivae are normal. Pupils are equal, round, and reactive to light.   Cardiovascular: Normal rate and regular rhythm.   Pulmonary/Chest: Effort normal and breath sounds normal.   Abdominal: Soft. There is " tenderness (right sided and epigastric). There is no rebound and no guarding.   Neurological: He is alert and oriented to person, place, and time.   Skin: No rash noted.   LLE varicose veins and plaque like papules with dry skin   Psychiatric: He has a normal mood and affect.   Nursing note and vitals reviewed.    Labs: reviewed and pertinent noted above    Assessment/Plan:  Moisés Koehler is a 43 y.o. male with ulcerative colitis of unknown distribution.  He has a past medical history of HIV (absolute CD4 count 907 an dundetectal HIV viral load on 11/19/18), GERD, history of cellulitis (1/2017).  In 2008 he developed symptoms of lower abdominal cramping and diarrhea at which time he had a colonoscopy and was diagnosed with ulcerative colitis and recalls that only a small portion of the colon was involved.  He was initially treated with colazal though he recalls having worsening diarrhea and abdominal pain within a week of starting so this was discontinued within a month of starting colazal.  He was diagnosed with HIV in 2010 and has been under the care of Dr. Millan since 2015 and continued on atripla.  He was treated with Nexium for chronic GERD and in 2015 was treated with creon for diarrhea and flatulence.  Over the years, he continued with weight loss, soft to loose BMs though he never wanted to start treatment for his UC and at some point there was a plan for referral to GI though the referral did not get placed.  In 10/2018 he began with some tenesmus symptoms and by 11/2018 he reported significant diarrhea with fast transit and undigested food in his stool.  He also reported some mucous in his stool and intermittent BRBPR that he attributed to hemorrhoids and persistent intermittent abd pain.  He had carotene levels that suggested some level of malabsorption and an elevated lipase at 274 (normal <60).  He also continued with a rash to his LLE (shin) that he has tried several topical treatments with  some improvement though no resolution.  Currently he is on atripla and no medication for his ulcerative colitis.  He is having 4-6 soft-formed BMs/day with no nocturnal BMs and occasional blood.        Patient has a history of ulcerative colitis that was given to him back in 2008 and took colazal but sounds like worsening symptoms with this and so he stopped taking. He was not diagnosed with HIV until 2 years later. He may have had hypersensitivity reaction to oral mesalamine.  Given he has not had a colonoscopy for some time I would like to start with labs, stool studies and a colonoscopy.  We will determine treatment based on these tests. Also in anticipation of possible proctitis given recent tenesmus symptoms and HIV positivity we will do an infectious workup for proctitis including rectal swab for gonorrhea/chlamydia.     # Diarrhea:   - rule out infection with stool culture, giardia/crypto, O/P  - stool c diff not ordered due to having formed stool  - stool elastase, stool fat stain (malabsorption, elevated lipase, low carotene)    # Ulcerative colitis of unknown distribution:   - I had a long discussion with patient regarding epidemiology, potential etiologies, associations and triggers (avoiding NSAIDS, using antibiotics with caution,stress and smoking effects on disease state), diagnosis, management goals and treatment options   - stool calproctectin  - basic labs: CBC, CMP, HIV, thyroid testing, celiac Ab testing  - proctitis workup anticipated based on symptoms and HIV diagnosis- HSV PCR, FTA Abs IgM and IgG (syphilis serology), EBV DNA PCR  E. Histolytica Ab  - Basic Labs: ESR, CRP reviewed from 11/19/2018  - drug monitoring labs: TB quantiferon, Hep B testing (HBsAg, HBtotalcoreAb)    # Elevated lipase, no epigastric pain today but has had in the past/serum carotene low/family history of pancreas cancer:  - stool elastase and stool fat stain given possible malabsorption   - repeat lipase  - may consider  EUS in the future    # IBD specific health maintenance:  Colorectal cancer risk:    Risks factors: unknown until colonoscopy to determine if UC and distribution of disease, if UC confirmed then has >10 years of disease  - Distribution of colonic disease:  unclear distribution of disease  - Year of symptom onset: 2008  - colonoscopy:  To be determined after colonoscopy    Opthamologic exam recommended yearly - next due now  Dermatologic exam recommended yearly - next due now    Bone health:  Risk of osteopenia/osteoporosis:  Risks factors: none  Vitamin D: vitamin D level ordered today    Vaccine counseling:  - VZV IgG, HAV IgG, HBsAb today   - referral to Infectious Disease clinic to get up to date on vaccinations    Follow up: 2 weeks after colonoscopy    Total visit time was 60 minutes, more than 50% of which was spent in face-to-face counseling with patient regarding evaluation for diarrhea in setting of possible history of ulcerative colitis and known history of HIV     Thank you again for sending Moisés Koehler to see Dr. Shaniqua Chawla today at the [facility]. Please don't hesitate to contact Dr. Chawla if there are any questions regarding this evaluation, or if you have any other patients with inflammatory bowel disease for whom you would like a consultation. You can reach Dr. Chawla at [phone] or by email at [email or web address]    History, physical exam, assessment and plan were performed by me personally. NP, Ghislaine Jarrett was present during entire visit     Shaniqua Chawla MD  Department of Gastroenterology  Medical Director, Inflammatory Bowel Disease

## 2018-11-20 NOTE — PROGRESS NOTES
[facility]          Inflammatory Bowel Disease New Patient Consultation Note         TODAY'S VISIT DATE:  11/20/2018    Reason for Consult:    Possible ulcerative colitis    PCP: Primary Doctor No      Referring MD:   Dr. Smooth Milaln    History of Present Illness:    Dear. Dr. Smooth Millan    Thank you for requesting a consultation on your patient Moisés Koehler who is a 43 y.o. male seen today at the [facility] on 11/20/2018 for possible history of ulcerative colitis  Pertinent past medical history includes HIV, GERD, history of cellulitis (1/2017).     As you know, Moisés Koehler has HIV (absolute CD4 count 907 an dundetectal HIV viral load on 11/19/18).  Patient was diagnosed with HIV in 2010 and is currently treated with atripla under the care of Dr. Millan since 2015. He takes serostim (growth hormone) that has helped hiim gain weight. Prior to the diagnosis of HIV in 2008 when he developed lower abdominal cramping, diarrhea.  He had a colonoscopy and diagnosed with ulcerative colitis but he recalls that it was only a portion of the colon that was involved. He recalls getting colazal and had worsening abdominal pain and diarrhea within a week of taking them and he stopped within a month due to not feeling well. He had ongoing stable bowel movements.      Patient has had chronic GERD and has had symptoms of epigastric pain in the past which has been treated with nexium.  He has also had some diarrhea and flatulence in 2015 that responded to creon.   In 3/2018 when he saw Dr. Millan he reported weight loss and frequent soft to loose BMs but notes indicate that he never wanted to start treatment for his UC. There was a plan for referral to GI then but not sure why this did not happen. At his visit on 11/19/18 with Dr. Millan he reported significant diarrhea up to 10 Bms/day and notices fast transit and sometimes undigested  food in his stool.  He also reported hemorrhoids.  He saw mucus but no blood in mucus. He does report BRBPR at times that he attributes to active hemorrhoids. He also reported persistent, nagging subumbilical abdominal pain (R>L) and sometimes does not feel like eating due to this dull pain. He also had a pruritic rash that correlated with worsening GI symptoms per his report.  He had carotene levels suggesting some level of malabsorption. Stool giardia/crypto on 2018 was negative, lipase elevated at 274 (normal <60).      IN 10/2018 he describes symptoms more consistent with tenesmus.  Some weight loss and low appetite and this has worsening over the past 5 months. Currently patient is on no IBD meds and is having 4-6 soft to formed BMs/day with no nocturnal bowel movements and with frequency occasionally will see blood in the stool. Intermittent worsening diarrhea with worsening hemorrhoids with rectal pain with blood in stool.  He has a rash LLE that is pruritic (steroid creams, holistic ointments with some improvement but not resolution). He is taking nexium for heartburn but takes this inconsistently 5 times/week and not taking it every morning as needed. Has some anxiety. He has joint pains of all joints.     Patient has no other gastrointestinal/constitutional complaints including no fevers or chills, nausea/vomiting (including no hematemesis), dysphagia. Also patient does not have any extraintestinal manifestations of their inflammatory bowel disease including no eye pain/redness, skin lesions/rashes, joint problems, oral ulcers.    Prior Pertinent Surgeries:   2016 L Irrigation and debridement of RUE    Pertinent Endoscopy/Imagin2015 US Abdomen: splenomegaly, attenuating hepatic parenchyma with elevated HRI, suggestive of greater than 5% steatosis     Pertinent Labs:  Lab Results   Component Value Date    SEDRATE 5 2018    CRP 1.7 2018     Lab Results   Component Value Date     HEPCAB Negative 11/19/2018     Therapeutic Drug Monitoring Labs:  NA    Prior IBD Therapies:  Atripla  Nexium  Creon  Proctofoam     Current IBD/HIV Therapies:  Atripla  Nexium  Serostim    Vaccinations:  Influenza (inactive): 2017, recommended yearly  Pneumococcal PCV 13: UTD per patient   Pneumococcal PCV 23: UTD per patient  Tetanus (TdaP): 2/13/17  HPV (males and females ages 19-27 yo):        Meningococcal (risk factors- complement component deficiency, spleen damage or splenectomy, HIV, traveling to endemic areas, college student residing in residence weaver,  recruits): recommended   Hepatitis B: recommended if no immunity  No results found for: HEPBSAB   Hepatitis A (risk factors- traveling to high endemic areas, chronic liver disease, clotting factor disorders, MSM, illicit drug users): recommended if no immunity  No results found for: HEPAIGG  MMR (live vaccine):      Chickenpox status/Varicella (live vaccine): had a chicken pox as a child  No results found for: VARICELLAZOS, VARICELLAINT  Zoster (age >49 yo, live vaccine): Shingrix series recommended     NSAID use/indication:  Ibuprofen once a week for shoulder pain    Narcotic use:  No    Alternative/Complementary Meds for IBD:  Creatine, amino acids prior to working     Review of Systems   Constitutional: Negative for chills, fever and weight loss.   HENT:        No oral ulcers, dysphagia, oral thrush   Eyes: Negative for blurred vision, pain and redness.   Respiratory: Negative for cough and shortness of breath.    Cardiovascular: Negative for chest pain.   Gastrointestinal: Positive for abdominal pain and heartburn. Negative for nausea and vomiting.   Genitourinary: Negative for dysuria and hematuria.   Musculoskeletal: Negative for back pain and joint pain.   Skin: Positive for rash.   Psychiatric/Behavioral: Negative for depression. The patient is nervous/anxious. The patient does not have insomnia.      Medical/Surgical History:    has a past  "medical history of AC joint dislocation, Anxiety, Arthritis, Atrial fibrillation, GERD (gastroesophageal reflux disease), HIV (human immunodeficiency virus infection), Hypertension, Personal history of malignant neoplasm of pancreas, and Ulcerative pancolitis without complication.   has a past surgical history that includes Cosmetic surgery; Hernia repair; Fracture surgery; and INCISION AND DRAINAGE (I&D), ARM (Right, 12/27/2016).    Family History:   family history includes Lung cancer in his maternal grandmother; Stomach cancer in his mother.    Social History:    reports that  has never smoked. he has never used smokeless tobacco. He reports that he uses drugs. He reports that he does not drink alcohol.    Review of patient's allergies indicates:  No Known Allergies    Current Medications:   Outpatient Medications Marked as Taking for the 11/26/18 encounter (Office Visit) with Shaniqua Chawla MD   Medication Sig Dispense Refill    efavirenz-emtrictabine-tenofovir 600-200-300 mg (ATRIPLA) 600-200-300 mg Tab TAKE ONE TABLET BY MOUTH EVERY EVENING 30 tablet 0    esomeprazole (NEXIUM) 20 MG capsule Take 20 mg by mouth before breakfast.      ibuprofen (ADVIL,MOTRIN) 200 MG tablet Take 400 mg by mouth every 6 (six) hours as needed for Pain (Pt takes once a week, rare).      SEROSTIM 6 mg SolR        Vital Signs:  BP (!) 157/91 (BP Location: Right arm, Patient Position: Sitting) Comment (BP Location): auto  Pulse 96 Comment: 96%  Temp 97.7 °F (36.5 °C)   Ht 6' 3" (1.905 m)   Wt 98.9 kg (218 lb 0.6 oz)   BMI 27.25 kg/m²   Physical Exam   Constitutional: He is oriented to person, place, and time. He appears well-developed.   HENT:   Mouth/Throat: Oropharynx is clear and moist. No oral lesions.   Eyes: Conjunctivae are normal. Pupils are equal, round, and reactive to light.   Cardiovascular: Normal rate and regular rhythm.   Pulmonary/Chest: Effort normal and breath sounds normal.   Abdominal: Soft. There is " tenderness (right sided and epigastric). There is no rebound and no guarding.   Neurological: He is alert and oriented to person, place, and time.   Skin: No rash noted.   LLE varicose veins and plaque like papules with dry skin   Psychiatric: He has a normal mood and affect.   Nursing note and vitals reviewed.    Labs: reviewed and pertinent noted above    Assessment/Plan:  Moisés Koehler is a 43 y.o. male with ulcerative colitis of unknown distribution.  He has a past medical history of HIV (absolute CD4 count 907 an dundetectal HIV viral load on 11/19/18), GERD, history of cellulitis (1/2017).  In 2008 he developed symptoms of lower abdominal cramping and diarrhea at which time he had a colonoscopy and was diagnosed with ulcerative colitis and recalls that only a small portion of the colon was involved.  He was initially treated with colazal though he recalls having worsening diarrhea and abdominal pain within a week of starting so this was discontinued within a month of starting colazal.  He was diagnosed with HIV in 2010 and has been under the care of Dr. Millan since 2015 and continued on atripla.  He was treated with Nexium for chronic GERD and in 2015 was treated with creon for diarrhea and flatulence.  Over the years, he continued with weight loss, soft to loose BMs though he never wanted to start treatment for his UC and at some point there was a plan for referral to GI though the referral did not get placed.  In 10/2018 he began with some tenesmus symptoms and by 11/2018 he reported significant diarrhea with fast transit and undigested food in his stool.  He also reported some mucous in his stool and intermittent BRBPR that he attributed to hemorrhoids and persistent intermittent abd pain.  He had carotene levels that suggested some level of malabsorption and an elevated lipase at 274 (normal <60).  He also continued with a rash to his LLE (shin) that he has tried several topical treatments with  some improvement though no resolution.  Currently he is on atripla and no medication for his ulcerative colitis.  He is having 4-6 soft-formed BMs/day with no nocturnal BMs and occasional blood.        Patient has a history of ulcerative colitis that was given to him back in 2008 and took colazal but sounds like worsening symptoms with this and so he stopped taking. He was not diagnosed with HIV until 2 years later. He may have had hypersensitivity reaction to oral mesalamine.  Given he has not had a colonoscopy for some time I would like to start with labs, stool studies and a colonoscopy.  We will determine treatment based on these tests. Also in anticipation of possible proctitis given recent tenesmus symptoms and HIV positivity we will do an infectious workup for proctitis including rectal swab for gonorrhea/chlamydia.     # Diarrhea:   - rule out infection with stool culture, giardia/crypto, O/P  - stool c diff not ordered due to having formed stool  - stool elastase, stool fat stain (malabsorption, elevated lipase, low carotene)    # Ulcerative colitis of unknown distribution:   - I had a long discussion with patient regarding epidemiology, potential etiologies, associations and triggers (avoiding NSAIDS, using antibiotics with caution,stress and smoking effects on disease state), diagnosis, management goals and treatment options   - stool calproctectin  - basic labs: CBC, CMP, HIV, thyroid testing, celiac Ab testing  - proctitis workup anticipated based on symptoms and HIV diagnosis- HSV PCR, FTA Abs IgM and IgG (syphilis serology), EBV DNA PCR  E. Histolytica Ab  - Basic Labs: ESR, CRP reviewed from 11/19/2018  - drug monitoring labs: TB quantiferon, Hep B testing (HBsAg, HBtotalcoreAb)    # Elevated lipase, no epigastric pain today but has had in the past/serum carotene low/family history of pancreas cancer:  - stool elastase and stool fat stain given possible malabsorption   - repeat lipase  - may consider  EUS in the future    # IBD specific health maintenance:  Colorectal cancer risk:    Risks factors: unknown until colonoscopy to determine if UC and distribution of disease, if UC confirmed then has >10 years of disease  - Distribution of colonic disease:  unclear distribution of disease  - Year of symptom onset: 2008  - colonoscopy:  To be determined after colonoscopy    Opthamologic exam recommended yearly - next due now  Dermatologic exam recommended yearly - next due now    Bone health:  Risk of osteopenia/osteoporosis:  Risks factors: none  Vitamin D: vitamin D level ordered today    Vaccine counseling:  - VZV IgG, HAV IgG, HBsAb today   - referral to Infectious Disease clinic to get up to date on vaccinations    Follow up: 2 weeks after colonoscopy    Total visit time was 60 minutes, more than 50% of which was spent in face-to-face counseling with patient regarding evaluation for diarrhea in setting of possible history of ulcerative colitis and known history of HIV     Thank you again for sending Moisés Koehler to see Dr. Shaniqua Chawla today at the [facility]. Please don't hesitate to contact Dr. Chawla if there are any questions regarding this evaluation, or if you have any other patients with inflammatory bowel disease for whom you would like a consultation. You can reach Dr. Chawla at [phone] or by email at [email or web address]    History, physical exam, assessment and plan were performed by me personally. NP, Ghislaine Jarrett was present during entire visit     Shaniqua Chawla MD  Department of Gastroenterology  Medical Director, Inflammatory Bowel Disease

## 2018-11-21 ENCOUNTER — PATIENT MESSAGE (OUTPATIENT)
Dept: INFECTIOUS DISEASES | Facility: CLINIC | Age: 43
End: 2018-11-21

## 2018-11-26 ENCOUNTER — TELEPHONE (OUTPATIENT)
Dept: ENDOSCOPY | Facility: HOSPITAL | Age: 43
End: 2018-11-26

## 2018-11-26 ENCOUNTER — OFFICE VISIT (OUTPATIENT)
Dept: GASTROENTEROLOGY | Facility: CLINIC | Age: 43
End: 2018-11-26
Payer: MEDICARE

## 2018-11-26 ENCOUNTER — LAB VISIT (OUTPATIENT)
Dept: LAB | Facility: HOSPITAL | Age: 43
End: 2018-11-26
Attending: INTERNAL MEDICINE
Payer: MEDICARE

## 2018-11-26 VITALS
WEIGHT: 218.06 LBS | DIASTOLIC BLOOD PRESSURE: 91 MMHG | BODY MASS INDEX: 27.11 KG/M2 | SYSTOLIC BLOOD PRESSURE: 157 MMHG | TEMPERATURE: 98 F | HEART RATE: 96 BPM | HEIGHT: 75 IN

## 2018-11-26 DIAGNOSIS — R19.7 DIARRHEA, UNSPECIFIED TYPE: Primary | ICD-10-CM

## 2018-11-26 DIAGNOSIS — R19.7 DIARRHEA, UNSPECIFIED TYPE: ICD-10-CM

## 2018-11-26 DIAGNOSIS — R10.9 ABDOMINAL PAIN, UNSPECIFIED ABDOMINAL LOCATION: ICD-10-CM

## 2018-11-26 DIAGNOSIS — Z12.11 SPECIAL SCREENING FOR MALIGNANT NEOPLASMS, COLON: Primary | ICD-10-CM

## 2018-11-26 DIAGNOSIS — R74.8 SERUM LIPASE ELEVATION: ICD-10-CM

## 2018-11-26 DIAGNOSIS — K51.90 ULCERATIVE COLITIS WITHOUT COMPLICATIONS, UNSPECIFIED LOCATION: ICD-10-CM

## 2018-11-26 DIAGNOSIS — Z80.0 FAMILY HISTORY OF PANCREATIC CANCER: ICD-10-CM

## 2018-11-26 PROBLEM — K64.8 OTHER HEMORRHOIDS: Status: ACTIVE | Noted: 2017-01-12

## 2018-11-26 LAB
25(OH)D3+25(OH)D2 SERPL-MCNC: 25 NG/ML
ALBUMIN SERPL BCP-MCNC: 4.3 G/DL
ALP SERPL-CCNC: 106 U/L
ALT SERPL W/O P-5'-P-CCNC: 22 U/L
ANION GAP SERPL CALC-SCNC: 9 MMOL/L
AST SERPL-CCNC: 29 U/L
BASOPHILS # BLD AUTO: 0.03 K/UL
BASOPHILS NFR BLD: 0.4 %
BILIRUB SERPL-MCNC: 0.4 MG/DL
BUN SERPL-MCNC: 15 MG/DL
CALCIUM SERPL-MCNC: 9.6 MG/DL
CHLORIDE SERPL-SCNC: 100 MMOL/L
CO2 SERPL-SCNC: 32 MMOL/L
CREAT SERPL-MCNC: 1.3 MG/DL
DIFFERENTIAL METHOD: ABNORMAL
EOSINOPHIL # BLD AUTO: 0.1 K/UL
EOSINOPHIL NFR BLD: 1 %
ERYTHROCYTE [DISTWIDTH] IN BLOOD BY AUTOMATED COUNT: 14.6 %
EST. GFR  (AFRICAN AMERICAN): >60 ML/MIN/1.73 M^2
EST. GFR  (NON AFRICAN AMERICAN): >60 ML/MIN/1.73 M^2
GLUCOSE SERPL-MCNC: 91 MG/DL
HBV CORE AB SERPL QL IA: NEGATIVE
HBV SURFACE AB SER-ACNC: POSITIVE M[IU]/ML
HBV SURFACE AG SERPL QL IA: NEGATIVE
HCT VFR BLD AUTO: 58.8 %
HEPATITIS A ANTIBODY, IGG: POSITIVE
HGB BLD-MCNC: 19.3 G/DL
IMM GRANULOCYTES # BLD AUTO: 0.02 K/UL
IMM GRANULOCYTES NFR BLD AUTO: 0.3 %
LIPASE SERPL-CCNC: 37 U/L
LYMPHOCYTES # BLD AUTO: 1.4 K/UL
LYMPHOCYTES NFR BLD: 21.1 %
MCH RBC QN AUTO: 30.3 PG
MCHC RBC AUTO-ENTMCNC: 32.8 G/DL
MCV RBC AUTO: 92 FL
MONOCYTES # BLD AUTO: 0.4 K/UL
MONOCYTES NFR BLD: 6.2 %
NEUTROPHILS # BLD AUTO: 4.8 K/UL
NEUTROPHILS NFR BLD: 71 %
NRBC BLD-RTO: 0 /100 WBC
PLATELET # BLD AUTO: 224 K/UL
PMV BLD AUTO: 10.1 FL
POTASSIUM SERPL-SCNC: 4.1 MMOL/L
PROT SERPL-MCNC: 8.1 G/DL
RBC # BLD AUTO: 6.37 M/UL
SODIUM SERPL-SCNC: 141 MMOL/L
T4 FREE SERPL-MCNC: 0.81 NG/DL
TSH SERPL DL<=0.005 MIU/L-ACNC: 2.23 UIU/ML
WBC # BLD AUTO: 6.77 K/UL

## 2018-11-26 PROCEDURE — 87529 HSV DNA AMP PROBE: CPT

## 2018-11-26 PROCEDURE — 86780 TREPONEMA PALLIDUM: CPT

## 2018-11-26 PROCEDURE — 87340 HEPATITIS B SURFACE AG IA: CPT

## 2018-11-26 PROCEDURE — 84443 ASSAY THYROID STIM HORMONE: CPT

## 2018-11-26 PROCEDURE — 99214 OFFICE O/P EST MOD 30 MIN: CPT | Mod: PBBFAC | Performed by: INTERNAL MEDICINE

## 2018-11-26 PROCEDURE — 99999 PR PBB SHADOW E&M-EST. PATIENT-LVL IV: CPT | Mod: PBBFAC,,, | Performed by: INTERNAL MEDICINE

## 2018-11-26 PROCEDURE — 86704 HEP B CORE ANTIBODY TOTAL: CPT

## 2018-11-26 PROCEDURE — 84439 ASSAY OF FREE THYROXINE: CPT

## 2018-11-26 PROCEDURE — 86787 VARICELLA-ZOSTER ANTIBODY: CPT

## 2018-11-26 PROCEDURE — 83690 ASSAY OF LIPASE: CPT

## 2018-11-26 PROCEDURE — 82306 VITAMIN D 25 HYDROXY: CPT

## 2018-11-26 PROCEDURE — 86790 VIRUS ANTIBODY NOS: CPT

## 2018-11-26 PROCEDURE — 36415 COLL VENOUS BLD VENIPUNCTURE: CPT

## 2018-11-26 PROCEDURE — 99205 OFFICE O/P NEW HI 60 MIN: CPT | Mod: S$PBB,,, | Performed by: INTERNAL MEDICINE

## 2018-11-26 PROCEDURE — 86706 HEP B SURFACE ANTIBODY: CPT

## 2018-11-26 PROCEDURE — 85025 COMPLETE CBC W/AUTO DIFF WBC: CPT

## 2018-11-26 PROCEDURE — 80053 COMPREHEN METABOLIC PANEL: CPT

## 2018-11-26 PROCEDURE — 83516 IMMUNOASSAY NONANTIBODY: CPT

## 2018-11-26 RX ORDER — POLYETHYLENE GLYCOL 3350, SODIUM SULFATE ANHYDROUS, SODIUM BICARBONATE, SODIUM CHLORIDE, POTASSIUM CHLORIDE 236; 22.74; 6.74; 5.86; 2.97 G/4L; G/4L; G/4L; G/4L; G/4L
4 POWDER, FOR SOLUTION ORAL ONCE
Qty: 4000 ML | Refills: 0 | Status: SHIPPED | OUTPATIENT
Start: 2018-11-26 | End: 2018-11-27

## 2018-11-26 RX ORDER — IBUPROFEN 200 MG
400 TABLET ORAL EVERY 6 HOURS PRN
COMMUNITY
End: 2019-12-13

## 2018-11-26 RX ORDER — HYDROGEN PEROXIDE 3 %
20 SOLUTION, NON-ORAL MISCELLANEOUS
COMMUNITY
End: 2020-11-11

## 2018-11-26 NOTE — LETTER
November 28, 2018      Smooth Millan MD  [address] [facility] [address]           [facility]  [address]  [facility] [address]  Phone: [phone]  Fax: [phone]          Patient: Moisés Koehler   MR Number: [id number]   YOB: 1975   Date of Visit: 11/26/2018       Dear Dr. Smooth Millan:    Thank you for referring Moisés Koehler to me for evaluation. Attached you will find relevant portions of my assessment and plan of care.    If you have questions, please do not hesitate to call me. I look forward to following Moisés Koehler along with you.    Sincerely,    Shaniqua Chawla MD    Enclosure  CC:  No Recipients    If you would like to receive this communication electronically, please contact [email or web address] or [phone] to request more information on [employer] access.    For providers and/or their staff who would like to refer a patient to [facility], please contact us through our one-stop-shop provider referral line, [facility], at [phone].    If you feel you have received this communication in error or would no longer like to receive these types of communications, please e-mail [email or web address]

## 2018-11-26 NOTE — PATIENT INSTRUCTIONS
Instructions:  - labs today  - stool studies, can turn in this week  - colonoscopy ASAP   - may benefit from a low fiber diet   - Avoid all NSAIDs (Advil, Ibuprofen, Motrin, Aspirin, Naprosyn, Aleve)  - Yearly Eye exam - due now  - Yearly Skin exam--wear sun block and hats - due now  - Use antibiotics with caution  - For Dental Procedures, use antibiotics only if absolutely necessary  - If you have to take antibiotics for any infection, please take a 2 week course of OTC Florastor 1 capsule twice daily probiotic after completion of the antibiotic course  - Vaccines recommended:  Flu shot yearly  Tetanus every 10 years  Hepatitis A series  Hepatitis B series   meningococcal   Shingrix series  - Follow up with Dr. Chawla 2 weeks after colonoscopy

## 2018-11-27 ENCOUNTER — PATIENT MESSAGE (OUTPATIENT)
Dept: ENDOSCOPY | Facility: HOSPITAL | Age: 43
End: 2018-11-27

## 2018-11-27 ENCOUNTER — LAB VISIT (OUTPATIENT)
Dept: LAB | Facility: HOSPITAL | Age: 43
End: 2018-11-27
Payer: MEDICARE

## 2018-11-27 DIAGNOSIS — R19.7 DIARRHEA, UNSPECIFIED TYPE: ICD-10-CM

## 2018-11-27 DIAGNOSIS — K51.90 ULCERATIVE COLITIS WITHOUT COMPLICATIONS, UNSPECIFIED LOCATION: ICD-10-CM

## 2018-11-27 LAB
EBV DNA BY PCR: NORMAL IU/ML
HSV1 DNA SPEC QL NAA+PROBE: NEGATIVE
HSV2 DNA SPEC QL NAA+PROBE: NEGATIVE
TTG IGA SER-ACNC: 5 UNITS

## 2018-11-27 PROCEDURE — 82656 EL-1 FECAL QUAL/SEMIQ: CPT

## 2018-11-27 PROCEDURE — 89125 SPECIMEN FAT STAIN: CPT

## 2018-11-27 PROCEDURE — 87329 GIARDIA AG IA: CPT

## 2018-11-27 PROCEDURE — 83993 ASSAY FOR CALPROTECTIN FECAL: CPT

## 2018-11-27 PROCEDURE — 87045 FECES CULTURE AEROBIC BACT: CPT

## 2018-11-27 PROCEDURE — 87328 CRYPTOSPORIDIUM AG IA: CPT

## 2018-11-27 PROCEDURE — 87046 STOOL CULTR AEROBIC BACT EA: CPT

## 2018-11-27 PROCEDURE — 87209 SMEAR COMPLEX STAIN: CPT

## 2018-11-27 PROCEDURE — 87427 SHIGA-LIKE TOXIN AG IA: CPT

## 2018-11-27 NOTE — TELEPHONE ENCOUNTER
Called and spoke with pt  I explained he has nothing to worry about.  The positive Hep B means he has immunity and does not need to be vaccinated  I also told him I have not had a chance to speak with Dr Chawla yet about his follow up but I will be in touch by the end of the wk   He expressed understanding and appreciated the call back

## 2018-11-28 LAB
CRYPTOSP AG STL QL IA: NEGATIVE
FAT STL SUDAN IV STN: NORMAL
G LAMBLIA AG STL QL IA: NEGATIVE
O+P STL TRI STN: NORMAL
T PALLIDUM AB SER QL IF: NORMAL
VARICELLA INTERPRETATION: POSITIVE
VARICELLA ZOSTER IGG: 3.87 ISR

## 2018-11-29 ENCOUNTER — HOSPITAL ENCOUNTER (OUTPATIENT)
Dept: RADIOLOGY | Facility: HOSPITAL | Age: 43
Discharge: HOME | End: 2018-11-29
Attending: ORTHOPAEDIC SURGERY
Payer: MEDICARE

## 2018-11-29 ENCOUNTER — OFFICE VISIT (OUTPATIENT)
Dept: SPORTS MEDICINE | Facility: CLINIC | Age: 43
End: 2018-11-29
Payer: MEDICARE

## 2018-11-29 VITALS
WEIGHT: 218 LBS | HEIGHT: 75 IN | BODY MASS INDEX: 27.1 KG/M2 | SYSTOLIC BLOOD PRESSURE: 156 MMHG | HEART RATE: 99 BPM | DIASTOLIC BLOOD PRESSURE: 85 MMHG

## 2018-11-29 DIAGNOSIS — M25.512 LEFT SHOULDER PAIN, UNSPECIFIED CHRONICITY: ICD-10-CM

## 2018-11-29 DIAGNOSIS — M25.512 LEFT SHOULDER PAIN, UNSPECIFIED CHRONICITY: Primary | ICD-10-CM

## 2018-11-29 LAB — E HISTOLYT AB SER IA-ACNC: NEGATIVE

## 2018-11-29 PROCEDURE — 73030 X-RAY EXAM OF SHOULDER: CPT | Mod: TC,FY,PO,LT

## 2018-11-29 PROCEDURE — 99999 PR PBB SHADOW E&M-EST. PATIENT-LVL IV: CPT | Mod: PBBFAC,,, | Performed by: ORTHOPAEDIC SURGERY

## 2018-11-29 PROCEDURE — 73030 X-RAY EXAM OF SHOULDER: CPT | Mod: 26,LT,, | Performed by: RADIOLOGY

## 2018-11-29 PROCEDURE — 99203 OFFICE O/P NEW LOW 30 MIN: CPT | Mod: S$PBB,,, | Performed by: ORTHOPAEDIC SURGERY

## 2018-11-29 PROCEDURE — 99214 OFFICE O/P EST MOD 30 MIN: CPT | Mod: PBBFAC,25,PO | Performed by: ORTHOPAEDIC SURGERY

## 2018-11-29 NOTE — PROGRESS NOTES
"CC: LEFT shoulder pain     43 y.o. Male with a history of left shoulder pain over the last 8 years. He used to be a  in the [employer] system (went by [employer] and fell directly onto the L shoulder 8y ago and suffered an AC separation. He was treated nonoperatively for that. He notes continued deformity since then that has always had some pain but it worsening over the last couple of years. He also has pain in the anterior aspect of the shoulder and radiating down the lateral arm. It is worse w/ overhead activity. He feels a cramping type sensation in his biceps. 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      Past Medical History:   Diagnosis Date    AC joint dislocation     Anxiety     Arthritis     Atrial fibrillation     GERD (gastroesophageal reflux disease) 2008    HIV (human immunodeficiency virus infection)     Hypertension     Personal history of malignant neoplasm of pancreas 9/5/2017    Ulcerative pancolitis without complication 9/5/2017       Past Surgical History:   Procedure Laterality Date    COSMETIC SURGERY      FRACTURE SURGERY      HERNIA REPAIR      INCISION AND DRAINAGE (I&D), ARM Right 12/27/2016    Performed by Richard Busch MD at [facility] OR [address] [place] [address]       Family History   Problem Relation Age of Onset    Stomach cancer Mother     Lung cancer Maternal Grandmother     Melanoma Neg Hx     Celiac disease Neg Hx     Cirrhosis Neg Hx     Colon cancer Neg Hx     Colon polyps Neg Hx     Crohn's disease Neg Hx     Cystic fibrosis Neg Hx     Esophageal cancer Neg Hx     Hemochromatosis Neg Hx     Inflammatory bowel disease Neg Hx     Irritable bowel syndrome Neg Hx     Liver cancer Neg Hx     Liver disease Neg Hx     Rectal cancer Neg Hx     Ulcerative colitis Neg Hx     Wilner's disease Neg Hx     Lymphoma Neg Hx     Tuberculosis Neg Hx     Scleroderma Neg Hx     Rheum arthritis Neg Hx     Multiple " "sclerosis Neg Hx     Lupus Neg Hx     Psoriasis Neg Hx     Skin cancer Neg Hx          Current Outpatient Medications:     efavirenz-emtrictabine-tenofovir 600-200-300 mg (ATRIPLA) 600-200-300 mg Tab, TAKE ONE TABLET BY MOUTH EVERY EVENING, Disp: 30 tablet, Rfl: 0    esomeprazole (NEXIUM) 20 MG capsule, Take 20 mg by mouth before breakfast., Disp: , Rfl:     ibuprofen (ADVIL,MOTRIN) 200 MG tablet, Take 400 mg by mouth every 6 (six) hours as needed for Pain (Pt takes once a week, rare)., Disp: , Rfl:     insulin syringe-needle U-100 1 mL 30 gauge X 7/16" Syrg, , Disp: , Rfl:     SEROSTIM 6 mg SolR, , Disp: , Rfl:     Review of patient's allergies indicates:  No Known Allergies       REVIEW OF SYSTEMS:  Constitution: Negative. Negative for chills, fever and night sweats.   HENT: Negative for congestion and headaches.    Eyes: Negative for blurred vision, left vision loss and right vision loss.   Cardiovascular: Negative for chest pain and syncope.   Respiratory: Negative for cough and shortness of breath.    Endocrine: Negative for polydipsia, polyphagia and polyuria.   Hematologic/Lymphatic: Negative for bleeding problem. Does not bruise/bleed easily.   Skin: Negative for dry skin, itching and rash.   Musculoskeletal: Negative for falls.  Positive for left shoulder pain and muscle weakness.   Gastrointestinal: Negative for abdominal pain and bowel incontinence.   Genitourinary: Negative for bladder incontinence and nocturia.   Neurological: Negative for disturbances in coordination, loss of balance and seizures.   Psychiatric/Behavioral: Negative for depression. The patient does not have insomnia.    Allergic/Immunologic: Negative for hives and persistent infections.      PHYSICAL EXAMINATION:  Vitals:  BP (!) 156/85   Pulse 99   Ht 6' 3" (1.905 m)   Wt 98.9 kg (218 lb)   BMI 27.25 kg/m²    General: The patient is alert and oriented x 3.  Mood is pleasant.  Observation of ears, eyes and nose reveal no " gross abnormalities.  No labored breathing observed.  Gait is coordinated. Patient can toe walk and heel walk without difficulty.      LEFT Shoulder / Upper Extremity Exam    OBSERVATION:     Swelling  none  Deformity  + deformity distal clavicle   Discoloration  none   Scapular winging none   Scars   none  Atrophy  none    TENDERNESS / CREPITUS (T/C):          T/C      T/C   Clavicle   -/-  SUPRAspinatus    +/-     AC Jt.    +/-  INFRAspinatus  -/-    SC Jt.    -/-  Deltoid    -/-      G. Tuberosity  +/-  LH BICEP groove  +/-   Acromion:  -/-  Midline Neck   -/-     Scapular Spine -/-  Trapezium   -/-   SMA Scapula  -/-  GH jt. line - post  -/-     Scapulothoracic  -/-         ROM: (* = with pain)  Right shoulder   Left shoulder        AROM (PROM)   AROM (PROM)   FE    170° (175°)     170° (175°)     ER at 0°    60°  (65°)    60°  (65°)   ER at 90° ABD  90°  (90°)    90°  (90°)   IR at 90°  ABD   NA  (40°)     NA  (40°)      IR (spine level)   T10     T10    STRENGTH: (* = with pain) Right shoulder   Left shoulder    SCAPTION   5/5    5/5    IR    5/5    5/5   ER    5/5    5/5   BICEPS   5/5    5/5   Deltoid    5/5    5/5     SIGNS:  Painful side       NEER   -    OPARTHS  +    WALDEN   +    SPEEDS  +     DROP ARM   -   BELLY PRESS neg   Superior escape none    LIFT-OFF  neg   X-Body ADD    neg    MOVING VALGUS neg        STABILITY TESTING    Right shoulder   Left shoulder    Translation     Anterior  up face     up face    Posterior  up face    up face    Sulcus   < 10mm    < 10 mm     Signs   Apprehension   neg      neg       Relocation   no change     no change      Jerk test  neg     neg    EXTREMITY NEURO-VASCULAR EXAM:    Sensation grossly intact to light touch all dermatomal regions.    DTR 2+ Biceps, Triceps, BR and Negative Kyles sign   Grossly intact motor function at Elbow, Wrist and Hand   Distal pulses radial and ulnar 2+, brisk cap refill, symmetric.      NECK:  Painless FROM and spinous  processes non-tender. Negative Spurlings sign.        XRAYS:  3V XR L shoulder demonstrate findings consistent w/ significantly displaced distal 1/3 clavicle fx w/ superior migration of medial aspect of clavicle. There is moderate GH arthritis w/ inferior humeral head osteophyte and joint space narrowing.           ASSESSMENT:   Left shoulder pain, possible:  1. Chronic CC ligament disruption   2.  SLAP tear / biceps tendinopathy  3.  GH arthritis    PLAN:      1. MRI arthrogram to evaluate biceps tendon and labrum as well as rotator cuff  2. Follow up in clinic to discuss MRI results    All questions were answered, patient will contact us for questions or concerns in the interim.

## 2018-11-30 LAB
BACTERIA STL CULT: NORMAL
E COLI SXT1 STL QL IA: NEGATIVE
E COLI SXT2 STL QL IA: NEGATIVE

## 2018-12-03 LAB
CALPROTECTIN STL-MCNT: 28.4 MCG/G
ELASTASE 1, FECAL: >500 MCG/G

## 2018-12-05 ENCOUNTER — TELEPHONE (OUTPATIENT)
Dept: RADIOLOGY | Facility: HOSPITAL | Age: 43
End: 2018-12-05

## 2018-12-05 DIAGNOSIS — Z21 HIV POSITIVE: ICD-10-CM

## 2018-12-05 RX ORDER — EFAVIRENZ, EMTRICITABINE AND TENOFOVIR DISOPROXIL FUMARATE 600; 200; 300 MG/1; MG/1; MG/1
1 TABLET, FILM COATED ORAL NIGHTLY
Qty: 30 TABLET | Refills: 0 | Status: SHIPPED | OUTPATIENT
Start: 2018-12-05 | End: 2018-12-31

## 2018-12-06 ENCOUNTER — HOSPITAL ENCOUNTER (OUTPATIENT)
Dept: RADIOLOGY | Facility: HOSPITAL | Age: 43
Discharge: HOME | End: 2018-12-06
Attending: ORTHOPAEDIC SURGERY
Payer: MEDICARE

## 2018-12-06 DIAGNOSIS — M25.512 LEFT SHOULDER PAIN, UNSPECIFIED CHRONICITY: ICD-10-CM

## 2018-12-06 PROCEDURE — 23350 INJECTION FOR SHOULDER X-RAY: CPT | Mod: LT,,, | Performed by: RADIOLOGY

## 2018-12-06 PROCEDURE — 73222 MRI JOINT UPR EXTREM W/DYE: CPT | Mod: TC,LT

## 2018-12-06 PROCEDURE — 73040 CONTRAST X-RAY OF SHOULDER: CPT | Mod: 26,LT,, | Performed by: RADIOLOGY

## 2018-12-06 PROCEDURE — 73040 CONTRAST X-RAY OF SHOULDER: CPT | Mod: TC

## 2018-12-06 PROCEDURE — 25000003 PHARM REV CODE 250: Performed by: ORTHOPAEDIC SURGERY

## 2018-12-06 PROCEDURE — 25500020 PHARM REV CODE 255: Performed by: ORTHOPAEDIC SURGERY

## 2018-12-06 PROCEDURE — 23350 INJECTION FOR SHOULDER X-RAY: CPT

## 2018-12-06 PROCEDURE — 73222 MRI JOINT UPR EXTREM W/DYE: CPT | Mod: 26,LT,, | Performed by: RADIOLOGY

## 2018-12-06 RX ORDER — LIDOCAINE HYDROCHLORIDE 10 MG/ML
2 INJECTION, SOLUTION EPIDURAL; INFILTRATION; INTRACAUDAL; PERINEURAL ONCE
Status: COMPLETED | OUTPATIENT
Start: 2018-12-06 | End: 2018-12-06

## 2018-12-06 RX ORDER — GADOBUTROL 604.72 MG/ML
7 INJECTION INTRAVENOUS
Status: COMPLETED | OUTPATIENT
Start: 2018-12-06 | End: 2018-12-06

## 2018-12-06 RX ADMIN — GADOBUTROL 7 ML: 604.72 INJECTION INTRAVENOUS at 01:12

## 2018-12-06 RX ADMIN — IOHEXOL 8 ML: 300 INJECTION, SOLUTION INTRAVENOUS at 01:12

## 2018-12-06 RX ADMIN — LIDOCAINE HYDROCHLORIDE 20 MG: 10 INJECTION, SOLUTION EPIDURAL; INFILTRATION; INTRACAUDAL; PERINEURAL at 01:12

## 2018-12-12 ENCOUNTER — ANESTHESIA EVENT (OUTPATIENT)
Dept: ENDOSCOPY | Facility: HOSPITAL | Age: 43
End: 2018-12-12
Payer: MEDICARE

## 2018-12-13 ENCOUNTER — HOSPITAL ENCOUNTER (OUTPATIENT)
Facility: HOSPITAL | Age: 43
Discharge: HOME | End: 2018-12-13
Attending: INTERNAL MEDICINE | Admitting: INTERNAL MEDICINE
Payer: MEDICARE

## 2018-12-13 ENCOUNTER — ANESTHESIA (OUTPATIENT)
Dept: ENDOSCOPY | Facility: HOSPITAL | Age: 43
End: 2018-12-13
Payer: MEDICARE

## 2018-12-13 VITALS
SYSTOLIC BLOOD PRESSURE: 125 MMHG | DIASTOLIC BLOOD PRESSURE: 70 MMHG | BODY MASS INDEX: 26.79 KG/M2 | TEMPERATURE: 98 F | HEIGHT: 76 IN | OXYGEN SATURATION: 96 % | HEART RATE: 74 BPM | WEIGHT: 220 LBS | RESPIRATION RATE: 16 BRPM

## 2018-12-13 DIAGNOSIS — Z21 HIV POSITIVE: Primary | ICD-10-CM

## 2018-12-13 DIAGNOSIS — K51.90 ULCERATIVE COLITIS WITHOUT COMPLICATIONS, UNSPECIFIED LOCATION: ICD-10-CM

## 2018-12-13 DIAGNOSIS — K62.89 PROCTITIS: ICD-10-CM

## 2018-12-13 PROCEDURE — 88305 TISSUE EXAM BY PATHOLOGIST: CPT | Mod: 59 | Performed by: PATHOLOGY

## 2018-12-13 PROCEDURE — 88305 TISSUE EXAM BY PATHOLOGIST: CPT | Mod: 26,,, | Performed by: PATHOLOGY

## 2018-12-13 PROCEDURE — 45380 COLONOSCOPY AND BIOPSY: CPT | Mod: ,,, | Performed by: INTERNAL MEDICINE

## 2018-12-13 PROCEDURE — 87591 N.GONORRHOEAE DNA AMP PROB: CPT

## 2018-12-13 PROCEDURE — 27201012 HC FORCEPS, HOT/COLD, DISP: Performed by: INTERNAL MEDICINE

## 2018-12-13 PROCEDURE — 25000003 PHARM REV CODE 250: Performed by: NURSE ANESTHETIST, CERTIFIED REGISTERED

## 2018-12-13 PROCEDURE — 45380 COLONOSCOPY AND BIOPSY: CPT | Performed by: INTERNAL MEDICINE

## 2018-12-13 PROCEDURE — 37000009 HC ANESTHESIA EA ADD 15 MINS: Performed by: INTERNAL MEDICINE

## 2018-12-13 PROCEDURE — 37000008 HC ANESTHESIA 1ST 15 MINUTES: Performed by: INTERNAL MEDICINE

## 2018-12-13 PROCEDURE — E9220 PRA ENDO ANESTHESIA: HCPCS | Mod: ,,, | Performed by: NURSE ANESTHETIST, CERTIFIED REGISTERED

## 2018-12-13 PROCEDURE — 63600175 PHARM REV CODE 636 W HCPCS: Performed by: NURSE ANESTHETIST, CERTIFIED REGISTERED

## 2018-12-13 RX ORDER — LIDOCAINE HCL/PF 100 MG/5ML
SYRINGE (ML) INTRAVENOUS
Status: DISCONTINUED | OUTPATIENT
Start: 2018-12-13 | End: 2018-12-13

## 2018-12-13 RX ORDER — SODIUM CHLORIDE 0.9 % (FLUSH) 0.9 %
3 SYRINGE (ML) INJECTION
Status: DISCONTINUED | OUTPATIENT
Start: 2018-12-13 | End: 2018-12-13 | Stop reason: HOSPADM

## 2018-12-13 RX ORDER — PROPOFOL 10 MG/ML
VIAL (ML) INTRAVENOUS
Status: DISCONTINUED | OUTPATIENT
Start: 2018-12-13 | End: 2018-12-13

## 2018-12-13 RX ORDER — SODIUM CHLORIDE 9 MG/ML
INJECTION, SOLUTION INTRAVENOUS CONTINUOUS
Status: DISCONTINUED | OUTPATIENT
Start: 2018-12-13 | End: 2018-12-13 | Stop reason: HOSPADM

## 2018-12-13 RX ORDER — PROPOFOL 10 MG/ML
VIAL (ML) INTRAVENOUS CONTINUOUS PRN
Status: DISCONTINUED | OUTPATIENT
Start: 2018-12-13 | End: 2018-12-13

## 2018-12-13 RX ORDER — SODIUM CHLORIDE 9 MG/ML
INJECTION, SOLUTION INTRAVENOUS CONTINUOUS PRN
Status: DISCONTINUED | OUTPATIENT
Start: 2018-12-13 | End: 2018-12-13

## 2018-12-13 RX ADMIN — PROPOFOL 50 MG: 10 INJECTION, EMULSION INTRAVENOUS at 08:12

## 2018-12-13 RX ADMIN — PROPOFOL 200 MCG/KG/MIN: 10 INJECTION, EMULSION INTRAVENOUS at 07:12

## 2018-12-13 RX ADMIN — PROPOFOL 50 MG: 10 INJECTION, EMULSION INTRAVENOUS at 07:12

## 2018-12-13 RX ADMIN — PROPOFOL 150 MG: 10 INJECTION, EMULSION INTRAVENOUS at 07:12

## 2018-12-13 RX ADMIN — SODIUM CHLORIDE: 0.9 INJECTION, SOLUTION INTRAVENOUS at 07:12

## 2018-12-13 RX ADMIN — SODIUM CHLORIDE: 0.9 INJECTION, SOLUTION INTRAVENOUS at 08:12

## 2018-12-13 RX ADMIN — LIDOCAINE HYDROCHLORIDE 50 MG: 20 INJECTION, SOLUTION INTRAVENOUS at 07:12

## 2018-12-13 NOTE — TRANSFER OF CARE
"Anesthesia Transfer of Care Note    Patient: Moisés Koehler    Procedure(s) Performed: Procedure(s) (LRB):  COLONOSCOPY (N/A)    Patient location: PACU    Anesthesia Type: general    Transport from OR: Transported from OR on 6-10 L/min O2 by face mask with adequate spontaneous ventilation    Post pain: adequate analgesia    Post assessment: no apparent anesthetic complications    Post vital signs: stable    Level of consciousness: sedated    Nausea/Vomiting: no nausea/vomiting    Complications: none    Transfer of care protocol was followed      Last vitals:   Visit Vitals  /77 (BP Location: Left arm, Patient Position: Lying)   Pulse 81   Temp 36.7 °C (98.1 °F) (Oral)   Resp 18   Ht 6' 4" (1.93 m)   Wt 99.8 kg (220 lb)   SpO2 97%   BMI 26.78 kg/m²     "

## 2018-12-13 NOTE — ANESTHESIA POSTPROCEDURE EVALUATION
"Anesthesia Post Evaluation    Patient: Moisés Koehler    Procedure(s) Performed: Procedure(s) (LRB):  COLONOSCOPY (N/A)    Final Anesthesia Type: general  Patient location during evaluation: PACU  Patient participation: Yes- Able to Participate  Level of consciousness: awake and alert  Post-procedure vital signs: reviewed and stable  Pain management: adequate  Airway patency: patent  PONV status at discharge: No PONV  Anesthetic complications: no      Cardiovascular status: blood pressure returned to baseline  Respiratory status: unassisted, room air and spontaneous ventilation  Hydration status: euvolemic  Follow-up not needed.        Visit Vitals  /70   Pulse 74   Temp 36.7 °C (98.1 °F)   Resp 16   Ht 6' 4" (1.93 m)   Wt 99.8 kg (220 lb)   SpO2 96%   BMI 26.78 kg/m²       Pain/Ekaterina Score: Ekaterina Score: 10 (12/13/2018  8:46 AM)        "

## 2018-12-13 NOTE — PROVATION PATIENT INSTRUCTIONS
Discharge Summary/Instructions after an Endoscopic Procedure  Patient Name: Moisés Koehler  Patient MRN: [id number]  Patient YOB: 1975  Thursday, December 13, 2018  Shaniqua Chawla MD  RESTRICTIONS:  During your procedure today, you received medications for sedation.  These   medications may affect your judgment, balance and coordination.  Therefore,   for 24 hours, you have the following restrictions:   - DO NOT drive a car, operate machinery, make legal/financial decisions,   sign important papers or drink alcohol.    ACTIVITY:  Today: no heavy lifting, straining or running due to procedural   sedation/anesthesia.  The following day: return to full activity including work.  DIET:  Eat and drink normally unless instructed otherwise.     TREATMENT FOR COMMON SIDE EFFECTS:  - Mild abdominal pain, nausea, belching, bloating or excessive gas:  rest,   eat lightly and use a heating pad.  - Sore Throat: treat with throat lozenges and/or gargle with warm salt   water.  - Because air was used during the procedure, expelling large amounts of air   from your rectum or belching is normal.  - If a bowel prep was taken, you may not have a bowel movement for 1-3 days.    This is normal.  SYMPTOMS TO WATCH FOR AND REPORT TO YOUR PHYSICIAN:  1. Abdominal pain or bloating, other than gas cramps.  2. Chest pain.  3. Back pain.  4. Signs of infection such as: chills or fever occurring within 24 hours   after the procedure.  5. Rectal bleeding, which would show as bright red, maroon, or black stools.   (A tablespoon of blood from the rectum is not serious, especially if   hemorrhoids are present.)  6. Vomiting.  7. Weakness or dizziness.  GO DIRECTLY TO THE NEAREST EMERGENCY ROOM IF YOU HAVE ANY OF THE FOLLOWING:      Difficulty breathing              Chills and/or fever over 101 F   Persistent vomiting and/or vomiting blood   Severe abdominal pain   Severe chest pain   Black, tarry stools   Bleeding- more than one  tablespoon   Any other symptom or condition that you feel may need urgent attention  Your doctor recommends these additional instructions:  If any biopsies were taken, your doctors clinic will contact you in 1 to 2   weeks with any results.  - Discharge patient to home.   - Patient has a contact number available for emergencies.  The signs and   symptoms of potential delayed complications were discussed with the   patient.  Return to normal activities tomorrow.  Written discharge   instructions were provided to the patient.   - Resume previous diet.   - Continue present medications.   - Await pathology results and rectal swab results.   - Repeat colonoscopy for screening purposes.   - Return to GI clinic as previously scheduled.   For questions, problems or results please call your physician - Shaniqua Chawla MD at Work:  [phone].  [facility], EMERGENCY ROOM PHONE NUMBER: [phone]  IF A COMPLICATION OR EMERGENCY SITUATION ARISES AND YOU ARE UNABLE TO REACH   YOUR PHYSICIAN - GO DIRECTLY TO THE EMERGENCY ROOM.  Shaniqua Chawla MD  12/13/2018 8:22:55 AM  This report has been verified and signed electronically.  [employer]

## 2018-12-13 NOTE — ANESTHESIA PREPROCEDURE EVALUATION
12/13/2018  Moisés Koehler is a 43 y.o., male.    Anesthesia Evaluation    I have reviewed the Patient Summary Reports.     I have reviewed the Medications.     Review of Systems  Hematology/Oncology:  Hematology Normal   Oncology Normal   Hematology Comments: HIV+    EENT/Dental:EENT/Dental Normal   Cardiovascular:   Hypertension Dysrhythmias atrial fibrillation    Pulmonary:  Pulmonary Normal    Renal/:  Renal/ Normal     Hepatic/GI:   PUD, (peptic ulcer disease) GERD    Musculoskeletal:  Musculoskeletal Normal    Neurological:  Neurology Normal    Endocrine:  Endocrine Normal    Dermatological:  Skin Normal        Physical Exam  General:  Well nourished    Airway/Jaw/Neck:  Airway Findings: Mouth Opening: Normal Mallampati: I        Eyes/Ears/Nose:  EYES/EARS/NOSE FINDINGS: Normal   Dental:  Dental Findings: In tact   Chest/Lungs:  Chest/Lungs Clear    Heart/Vascular:  Heart Findings: Normal Heart murmur: negative Vascular Findings: Normal    Abdomen:  Abdomen Findings: Normal    Musculoskeletal:  Musculoskeletal Findings: Normal   Skin:  Skin Findings: Normal    Mental Status:  Mental Status Findings: Normal        Anesthesia Plan  Type of Anesthesia, risks & benefits discussed:  Anesthesia Type:  general  Patient's Preference: general  Intra-op Monitoring Plan: standard ASA monitors  Intra-op Monitoring Plan Comments:   Post Op Pain Control Plan:   Post Op Pain Control Plan Comments:   Induction:   IV  Beta Blocker:  Patient is not currently on a Beta-Blocker (No further documentation required).       Informed Consent: Patient understands risks and agrees with Anesthesia plan.  Questions answered. Anesthesia consent signed with patient.  ASA Score: 2     Day of Surgery Review of History & Physical:  There are no significant changes.          Ready For Surgery From Anesthesia Perspective.

## 2018-12-14 ENCOUNTER — OFFICE VISIT (OUTPATIENT)
Dept: SPORTS MEDICINE | Facility: CLINIC | Age: 43
End: 2018-12-14
Payer: MEDICARE

## 2018-12-14 VITALS
DIASTOLIC BLOOD PRESSURE: 90 MMHG | SYSTOLIC BLOOD PRESSURE: 157 MMHG | HEART RATE: 77 BPM | BODY MASS INDEX: 26.79 KG/M2 | WEIGHT: 220 LBS | HEIGHT: 76 IN

## 2018-12-14 DIAGNOSIS — M19.012 GLENOHUMERAL ARTHRITIS, LEFT: ICD-10-CM

## 2018-12-14 DIAGNOSIS — S43.432D TEAR OF LEFT GLENOID LABRUM, SUBSEQUENT ENCOUNTER: Primary | ICD-10-CM

## 2018-12-14 DIAGNOSIS — M25.512 CHRONIC LEFT SHOULDER PAIN: Primary | ICD-10-CM

## 2018-12-14 DIAGNOSIS — M75.22 BICEPS TENDINITIS OF LEFT UPPER EXTREMITY: ICD-10-CM

## 2018-12-14 DIAGNOSIS — G89.29 CHRONIC LEFT SHOULDER PAIN: Primary | ICD-10-CM

## 2018-12-14 LAB
C TRACH RRNA SPEC QL NAA+PROBE: NEGATIVE
C.TRACH RNA SOURCE: NORMAL
N GONORRHOEA RRNA SPEC QL NAA+PROBE: NEGATIVE
N.GONORROHEAE, AMP RNA SOURCE: NORMAL

## 2018-12-14 PROCEDURE — 99215 OFFICE O/P EST HI 40 MIN: CPT | Mod: S$PBB,,, | Performed by: PHYSICIAN ASSISTANT

## 2018-12-14 PROCEDURE — 99999 PR PBB SHADOW E&M-EST. PATIENT-LVL III: CPT | Mod: PBBFAC,,, | Performed by: PHYSICIAN ASSISTANT

## 2018-12-14 PROCEDURE — 99213 OFFICE O/P EST LOW 20 MIN: CPT | Mod: PBBFAC,PO | Performed by: PHYSICIAN ASSISTANT

## 2018-12-14 NOTE — PROGRESS NOTES
"CC: LEFT shoulder pain    43 y.o. Male with a history of left shoulder pain over the last 8 years. He is here today to review his MRIa results.    He used to be a  in the [employer] (went by [employer]") and fell directly onto the L shoulder 8y ago and suffered an AC separation. He was treated nonoperatively for that. He notes continued deformity since then that has always had some pain but it worsening over the last couple of years. He also has pain in the anterior aspect of the shoulder and radiating down the lateral arm. It is worse w/ overhead activity. He feels a cramping type sensation in his biceps. 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      Past Medical History:   Diagnosis Date    AC joint dislocation     Anxiety     Arthritis     Atrial fibrillation     GERD (gastroesophageal reflux disease) 2008    HIV (human immunodeficiency virus infection)     Hypertension     Personal history of malignant neoplasm of pancreas 9/5/2017    Ulcerative pancolitis without complication 9/5/2017       Past Surgical History:   Procedure Laterality Date    COSMETIC SURGERY      FRACTURE SURGERY      HERNIA REPAIR      INCISION AND DRAINAGE (I&D), ARM Right 12/27/2016    Performed by Richard Busch MD at [facility] [address] [place] [address]       Family History   Problem Relation Age of Onset    Stomach cancer Mother     Lung cancer Maternal Grandmother     Melanoma Neg Hx     Celiac disease Neg Hx     Cirrhosis Neg Hx     Colon cancer Neg Hx     Colon polyps Neg Hx     Crohn's disease Neg Hx     Cystic fibrosis Neg Hx     Esophageal cancer Neg Hx     Hemochromatosis Neg Hx     Inflammatory bowel disease Neg Hx     Irritable bowel syndrome Neg Hx     Liver cancer Neg Hx     Liver disease Neg Hx     Rectal cancer Neg Hx     Ulcerative colitis Neg Hx     Wilner's disease Neg Hx     Lymphoma Neg Hx     Tuberculosis Neg Hx     Scleroderma Neg " "Hx     Rheum arthritis Neg Hx     Multiple sclerosis Neg Hx     Lupus Neg Hx     Psoriasis Neg Hx     Skin cancer Neg Hx          Current Outpatient Medications:     efavirenz-emtrictabine-tenofovir 600-200-300 mg (ATRIPLA) 600-200-300 mg Tab, Take 1 tablet by mouth every evening., Disp: 30 tablet, Rfl: 0    esomeprazole (NEXIUM) 20 MG capsule, Take 20 mg by mouth before breakfast., Disp: , Rfl:     ibuprofen (ADVIL,MOTRIN) 200 MG tablet, Take 400 mg by mouth every 6 (six) hours as needed for Pain (Pt takes once a week, rare)., Disp: , Rfl:     insulin syringe-needle U-100 1 mL 30 gauge X 7/16" Syrg, , Disp: , Rfl:     SEROSTIM 6 mg SolR, , Disp: , Rfl:   No current facility-administered medications for this visit.     Review of patient's allergies indicates:  No Known Allergies       REVIEW OF SYSTEMS:  Constitution: Negative. Negative for chills, fever and night sweats.   HENT: Negative for congestion and headaches.    Eyes: Negative for blurred vision, left vision loss and right vision loss.   Cardiovascular: Negative for chest pain and syncope.   Respiratory: Negative for cough and shortness of breath.    Endocrine: Negative for polydipsia, polyphagia and polyuria.   Hematologic/Lymphatic: Negative for bleeding problem. Does not bruise/bleed easily.   Skin: Negative for dry skin, itching and rash.   Musculoskeletal: Negative for falls.  Positive for left shoulder pain and muscle weakness.   Gastrointestinal: Negative for abdominal pain and bowel incontinence.   Genitourinary: Negative for bladder incontinence and nocturia.   Neurological: Negative for disturbances in coordination, loss of balance and seizures.   Psychiatric/Behavioral: Negative for depression. The patient does not have insomnia.    Allergic/Immunologic: Negative for hives and persistent infections.      PHYSICAL EXAMINATION:  Vitals:  BP (!) 157/90   Pulse 77   Ht 6' 4" (1.93 m)   Wt 99.8 kg (220 lb)   BMI 26.78 kg/m²    General: " The patient is alert and oriented x 3.  Mood is pleasant.  Observation of ears, eyes and nose reveal no gross abnormalities.  No labored breathing observed.  Gait is coordinated. Patient can toe walk and heel walk without difficulty.      LEFT Shoulder / Upper Extremity Exam    OBSERVATION:     Swelling  none  Deformity  + deformity distal clavicle   Discoloration  none   Scapular winging none   Scars   none  Atrophy  none    TENDERNESS / CREPITUS (T/C):          T/C      T/C   Clavicle   -/-  SUPRAspinatus    +/-     AC Jt.    +/-  INFRAspinatus  -/-    SC Jt.    -/-  Deltoid    -/-      G. Tuberosity  +/-  LH BICEP groove  +/-   Acromion:  -/-  Midline Neck   -/-     Scapular Spine -/-  Trapezium   -/-   SMA Scapula  -/-  GH jt. line - post  -/-     Scapulothoracic  -/-         ROM: (* = with pain)  Right shoulder   Left shoulder        AROM (PROM)   AROM (PROM)   FE    170° (175°)     170° (175°)     ER at 0°    60°  (65°)    60°  (65°)   ER at 90° ABD  90°  (90°)    90°  (90°)   IR at 90°  ABD   NA  (40°)     NA  (40°)      IR (spine level)   T10     T10    STRENGTH: (* = with pain) Right shoulder   Left shoulder    SCAPTION   5/5    5/5    IR    5/5    5/5   ER    5/5    5/5   BICEPS   5/5    5/5   Deltoid    5/5    5/5     SIGNS:  Painful side       NEER   -    OPARTHS  +    WALDEN   +    SPEEDS  +     DROP ARM   -   BELLY PRESS neg   Superior escape none    LIFT-OFF  neg   X-Body ADD    neg    MOVING VALGUS neg        STABILITY TESTING    Right shoulder   Left shoulder    Translation     Anterior  up face     up face    Posterior  up face    up face    Sulcus   < 10mm    < 10 mm     Signs   Apprehension   neg      neg       Relocation   no change     no change      Jerk test  neg     neg    EXTREMITY NEURO-VASCULAR EXAM:    Sensation grossly intact to light touch all dermatomal regions.    DTR 2+ Biceps, Triceps, BR and Negative Kyles sign   Grossly intact motor function at Elbow, Wrist and  Hand   Distal pulses radial and ulnar 2+, brisk cap refill, symmetric.      NECK:  Painless FROM and spinous processes non-tender. Negative Spurlings sign.      XRAYS:  3V XR L shoulder demonstrate findings consistent w/ significantly displaced distal 1/3 clavicle fx w/ superior migration of medial aspect of clavicle. There is moderate GH arthritis w/ inferior humeral head osteophyte and joint space narrowing.     MRIa LEFT SHOULDER 12/6/18    FINDINGS:  Rotator cuff: Infraspinatus, teres minor, and subscapularis tendons are intact.  Supraspinatus tendinosis and low-grade undersurface fraying noted.  Muscle bulk is maintained.    Labrum: There is circumferential tear of the glenoid labrum.    Biceps: There is tendinosis of the intra-articular biceps tendon with small interstitial tear.    Bone: There is no acute fracture.  Bone marrow signal is unremarkable.    Acromioclavicular joint: Remote nonunited distal clavicular fracture noted.    Cartilage: There is extensive full-thickness cartilage loss throughout the glenoid and humeral head with subchondral edema and cystic change.    Miscellaneous: There is intra-articular synovitis/debris.    Impression:  Severe glenohumeral osteoarthritis.    Circumferential tear of the glenoid labrum.    Long head biceps tendinosis and interstitial tear.    Supraspinatus tendinosis and low-grade undersurface fraying.    Remote nonunited distal clavicular fracture.    Glenohumeral joint synovitis/debris.      ASSESSMENT:   Left shoulder pain, GH arthritis, labral tear, biceps tendonosis    PLAN:      1. Treatment options were discussed with the patient about shoulder.  I reviewed the MRIa images with his and what this means for his shoulder.    We discussed both non-operative and operative options for his shoulder and the risks and benefits of each. Time was given for questions to be asked and all concerns were answered.    He would like to go forward with surgery for his shoulder  which I think is reasonable.  All specific risks and benefits were reviewed.    These risks include but are not limited to: bleeding, infection, scarring, re-tear of repair, irreparability of the tear, damage to neurovascular structures, damage to cartilage, stiffness, blood clots, pulmonary embolism, swelling, compartment syndrome, need for further surgery, and the risks of anesthesia.      He verbalized her understanding of these risks and wished to proceed with surgery.    Time was spent face-to-face with the patient during this encounter on counseling about treatment options including surgery and coordination of his care for preoperative visits, surgery and post-operative rehab.     The operative plan will be:    left   1. Arthroscopic labral repair vs. debridement  2. Arthroscopic subacromial decompression  3. Arthroscopic rotator cuff debridement with possible rotation medical patch  4. Possible loose body removal  5. Possible open biceps subpectoral tenodesis    Patient will need medical clearance prior to the pre-operative appointment.    All questions were answered, patient will contact us for questions or concerns in the interim.

## 2018-12-17 ENCOUNTER — TELEPHONE (OUTPATIENT)
Dept: SPORTS MEDICINE | Facility: CLINIC | Age: 43
End: 2018-12-17

## 2018-12-17 DIAGNOSIS — G89.29 CHRONIC LEFT SHOULDER PAIN: Primary | ICD-10-CM

## 2018-12-17 DIAGNOSIS — M25.512 CHRONIC LEFT SHOULDER PAIN: Primary | ICD-10-CM

## 2018-12-17 NOTE — TELEPHONE ENCOUNTER
11/17/19  Left shoulder    ----- Message from Carlotta Bob MA sent at 12/14/2018 10:50 AM CST -----  Patient will do PT at [facility]    Pre op - 01/11/19    Date of surgery - 01/14/19

## 2018-12-20 ENCOUNTER — OFFICE VISIT (OUTPATIENT)
Dept: INFECTIOUS DISEASES | Facility: CLINIC | Age: 43
End: 2018-12-20
Payer: MEDICARE

## 2018-12-20 ENCOUNTER — TELEPHONE (OUTPATIENT)
Dept: ENDOSCOPY | Facility: HOSPITAL | Age: 43
End: 2018-12-20

## 2018-12-20 ENCOUNTER — HOSPITAL ENCOUNTER (OUTPATIENT)
Dept: CARDIOLOGY | Facility: CLINIC | Age: 43
Discharge: HOME | End: 2018-12-20
Payer: MEDICARE

## 2018-12-20 VITALS
HEIGHT: 76 IN | TEMPERATURE: 99 F | DIASTOLIC BLOOD PRESSURE: 85 MMHG | SYSTOLIC BLOOD PRESSURE: 136 MMHG | HEART RATE: 82 BPM | BODY MASS INDEX: 26.44 KG/M2 | WEIGHT: 217.13 LBS

## 2018-12-20 DIAGNOSIS — S43.102S SEPARATION OF LEFT ACROMIOCLAVICULAR JOINT, SEQUELA: ICD-10-CM

## 2018-12-20 DIAGNOSIS — Z21 HIV POSITIVE: Primary | ICD-10-CM

## 2018-12-20 DIAGNOSIS — M25.512 CHRONIC LEFT SHOULDER PAIN: ICD-10-CM

## 2018-12-20 DIAGNOSIS — G89.29 CHRONIC LEFT SHOULDER PAIN: ICD-10-CM

## 2018-12-20 PROCEDURE — 99999 PR PBB SHADOW E&M-EST. PATIENT-LVL III: CPT | Mod: PBBFAC,,, | Performed by: INTERNAL MEDICINE

## 2018-12-20 PROCEDURE — 99213 OFFICE O/P EST LOW 20 MIN: CPT | Mod: S$PBB,,, | Performed by: INTERNAL MEDICINE

## 2018-12-20 PROCEDURE — 99213 OFFICE O/P EST LOW 20 MIN: CPT | Mod: PBBFAC,25 | Performed by: INTERNAL MEDICINE

## 2018-12-20 PROCEDURE — 93005 ELECTROCARDIOGRAM TRACING: CPT | Mod: PBBFAC | Performed by: INTERNAL MEDICINE

## 2018-12-20 PROCEDURE — 93010 ELECTROCARDIOGRAM REPORT: CPT | Mod: S$PBB,,, | Performed by: INTERNAL MEDICINE

## 2018-12-20 NOTE — PROGRESS NOTES
Subjective:      Patient ID: Moisés Koehler is a 43 y.o. male.    Chief Complaint:No chief complaint on file.      History of Present Illness    Doing well. Awaiting surgery on shoulder in January. Diarrhea resolved. Colonoscope found no evidence of colitis - totally normal.    Review of Systems   Constitution: Negative for chills, decreased appetite, fever, weakness, malaise/fatigue, night sweats, weight gain and weight loss.   HENT: Negative for congestion, ear pain, hearing loss, hoarse voice, sore throat and tinnitus.    Eyes: Negative for blurred vision, redness and visual disturbance.   Cardiovascular: Negative for chest pain, leg swelling and palpitations.   Respiratory: Negative for cough, hemoptysis, shortness of breath and sputum production.    Hematologic/Lymphatic: Negative for adenopathy. Does not bruise/bleed easily.   Skin: Negative for dry skin, itching, rash and suspicious lesions.   Musculoskeletal: Negative for back pain, joint pain, myalgias and neck pain.   Gastrointestinal: Negative for abdominal pain, constipation, diarrhea, heartburn, nausea and vomiting.   Genitourinary: Negative for dysuria, flank pain, frequency, hematuria, hesitancy and urgency.   Neurological: Negative for dizziness, headaches, numbness and paresthesias.   Psychiatric/Behavioral: Negative for depression and memory loss. The patient does not have insomnia and is not nervous/anxious.      Objective:   Physical Exam   Constitutional: He is oriented to person, place, and time. He appears well-developed and well-nourished.   HENT:   Head: Normocephalic and atraumatic.   Eyes: Conjunctivae and EOM are normal. Pupils are equal, round, and reactive to light.   Cardiovascular: Normal rate, regular rhythm and normal heart sounds.   Pulmonary/Chest: Effort normal and breath sounds normal.   Abdominal: Soft. Bowel sounds are normal. A hernia (small left) is present.   Musculoskeletal: Normal range of motion. He exhibits no  edema.   Neurological: He is alert and oriented to person, place, and time.   Nursing note and vitals reviewed.    Assessment:       1. HIV positive    2. Separation of left acromioclavicular joint, sequela    3. Chronic left shoulder pain          Plan:       ECG for surgical clearance. Labs reviewed today. He is cleared for shoulder surgery from my standpoint, if ECG is normal.    Addendum - I was contacted for this specific addendum. The ECG is read as normal. He is cleared for shoulder surgery from my standpoint, as I stated above.

## 2018-12-26 DIAGNOSIS — M24.012 LOOSE BODY OF LEFT SHOULDER: ICD-10-CM

## 2018-12-26 DIAGNOSIS — S43.432A SUPERIOR GLENOID LABRUM LESION OF LEFT SHOULDER, INITIAL ENCOUNTER: Primary | ICD-10-CM

## 2018-12-26 DIAGNOSIS — M75.22 BICEPS TENDINITIS OF LEFT UPPER EXTREMITY: ICD-10-CM

## 2018-12-31 ENCOUNTER — PATIENT MESSAGE (OUTPATIENT)
Dept: INFECTIOUS DISEASES | Facility: CLINIC | Age: 43
End: 2018-12-31

## 2018-12-31 DIAGNOSIS — Z21 HIV POSITIVE: ICD-10-CM

## 2018-12-31 RX ORDER — AZITHROMYCIN 500 MG/1
500 TABLET, FILM COATED ORAL DAILY
Qty: 5 TABLET | Refills: 1 | Status: SHIPPED | OUTPATIENT
Start: 2018-12-31 | End: 2018-12-31 | Stop reason: SDUPTHER

## 2018-12-31 RX ORDER — EFAVIRENZ, EMTRICITABINE AND TENOFOVIR DISOPROXIL FUMARATE 600; 200; 300 MG/1; MG/1; MG/1
1 TABLET, FILM COATED ORAL NIGHTLY
Qty: 30 TABLET | Refills: 0 | Status: CANCELLED | OUTPATIENT
Start: 2018-12-31

## 2018-12-31 RX ORDER — AZITHROMYCIN 500 MG/1
500 TABLET, FILM COATED ORAL DAILY
Qty: 5 TABLET | Refills: 1 | Status: SHIPPED | OUTPATIENT
Start: 2018-12-31 | End: 2019-01-05

## 2019-01-03 ENCOUNTER — OFFICE VISIT (OUTPATIENT)
Dept: GASTROENTEROLOGY | Facility: CLINIC | Age: 44
End: 2019-01-03
Payer: MEDICARE

## 2019-01-03 ENCOUNTER — TELEPHONE (OUTPATIENT)
Dept: GASTROENTEROLOGY | Facility: CLINIC | Age: 44
End: 2019-01-03

## 2019-01-03 VITALS
TEMPERATURE: 98 F | DIASTOLIC BLOOD PRESSURE: 96 MMHG | BODY MASS INDEX: 25.5 KG/M2 | WEIGHT: 209.44 LBS | HEART RATE: 94 BPM | HEIGHT: 76 IN | SYSTOLIC BLOOD PRESSURE: 163 MMHG

## 2019-01-03 DIAGNOSIS — Z21 HIV POSITIVE: ICD-10-CM

## 2019-01-03 DIAGNOSIS — K41.90 NON-RECURRENT UNILATERAL FEMORAL HERNIA WITHOUT OBSTRUCTION OR GANGRENE: ICD-10-CM

## 2019-01-03 DIAGNOSIS — K58.9 IRRITABLE BOWEL SYNDROME, UNSPECIFIED TYPE: Primary | ICD-10-CM

## 2019-01-03 PROBLEM — K62.89 PROCTITIS: Status: RESOLVED | Noted: 2018-12-13 | Resolved: 2019-01-03

## 2019-01-03 PROBLEM — K51.90 ULCERATIVE COLITIS WITHOUT COMPLICATIONS: Status: RESOLVED | Noted: 2018-11-26 | Resolved: 2019-01-03

## 2019-01-03 PROCEDURE — 99214 OFFICE O/P EST MOD 30 MIN: CPT | Mod: S$PBB,,, | Performed by: INTERNAL MEDICINE

## 2019-01-03 PROCEDURE — 99999 PR PBB SHADOW E&M-EST. PATIENT-LVL IV: ICD-10-PCS | Mod: PBBFAC,,, | Performed by: INTERNAL MEDICINE

## 2019-01-03 PROCEDURE — 99214 OFFICE O/P EST MOD 30 MIN: CPT | Mod: PBBFAC | Performed by: INTERNAL MEDICINE

## 2019-01-03 PROCEDURE — 99999 PR PBB SHADOW E&M-EST. PATIENT-LVL IV: CPT | Mod: PBBFAC,,, | Performed by: INTERNAL MEDICINE

## 2019-01-03 PROCEDURE — 99214 PR OFFICE/OUTPT VISIT, EST, LEVL IV, 30-39 MIN: ICD-10-PCS | Mod: S$PBB,,, | Performed by: INTERNAL MEDICINE

## 2019-01-03 NOTE — TELEPHONE ENCOUNTER
----- Message from Bo Shepard MD sent at 1/3/2019  1:47 PM CST -----  Forgot to mention to patient...  Can you inform him his VitD was low and we recommend:  recommend patient take 1000 IU Vitamin D3 daily and repeat Vitamin D level in 3-6 months    Thanks CHET

## 2019-01-03 NOTE — PATIENT INSTRUCTIONS
1. Start benefiber or citrucel 1 tablespoon before meals twice a day.  2. Try gaviscon for indigestion / reflux type syptoms as needed.  3. Give us update in 2 -3 weeks with your symptoms.   4. Will refer to general surgery for suspected femoral hernia on left side.

## 2019-01-03 NOTE — PROGRESS NOTES
[facility]             Inflammatory Bowel Disease Follow-up  Note              TODAY'S VISIT DATE:  1/3/2019    Chief Complaint:   Diarrhea/IBS    PCP: Primary Doctor No    Previous History:  Moisés Koehler is a 43 y.o. male with prior diagnosis of ulcerative colitis (unsure of accuracy of that diagnosis) and past medical history of HIV (absolute CD4 count 907 an dundetectal HIV viral load on 11/19/18), GERD, history of cellulitis (1/2017).  In 2008 he developed symptoms of lower abdominal cramping and diarrhea at which time he had a colonoscopy and was diagnosed with ulcerative colitis and recalls that only a small portion of the colon was involved.  He was initially treated with colazal though he recalls having worsening diarrhea and abdominal pain within a week of starting so this was discontinued within a month of starting colazal.  He was diagnosed with HIV in 2010 and has been under the care of Dr. Millan since 2015 and continued on atripla.  He was treated with Nexium for chronic GERD and in 2015 was treated with creon for diarrhea and flatulence.  Over the years, he continued with weight loss, soft to loose BMs though he never wanted to start treatment for his UC and at some point there was a plan for referral to GI though the referral did not get placed.  In 10/2018 he began with some tenesmus symptoms and by 11/2018 he reported significant diarrhea with fast transit and undigested food in his stool.  He also reported some mucous in his stool and intermittent BRBPR that he attributed to hemorrhoids and persistent intermittent abd pain.  He had carotene levels that suggested some level of malabsorption and an elevated lipase at 274 (normal <60).  He also continued with a rash to his LLE (shin) that he has tried several topical treatments with some improvement though no resolution.  He was referred by his ID doctor for consultation in the IBD clinic on 11/26/18 at which time  he was on atripla and is currently having 4-6 soft-formed BMs/day with no nocturnal BMs and occasional blood.      Interval History:  - current IBD meds: none   - 18:  Colonoscopy:  Rectal swab for chlamydia/gonorrhea negative, colon and ileum normal including biopsies throughout.   - 5 loose to soft (like soft serve) Bowel Movements/day ; no blood  - most bothersome is the urgency and feeling of incomplete evacuation  - has cut back dairy use, now uses lactose free milk  - currently taking Z pack for chest cold  - has symptoms of epigastric discomfort and indigestion, feels like 'reflux' despite being on nexium 20 (takes 5 days / week approx)  - uses protein shakes and preworkout formulas daily  - NSAID use: occasional    - constitutional/GI symptoms: no fevers/chills, weight loss, dysphagia  - extraintestinal manifestations: no eye pain/redness, liver problems, joint pain/swelling/stiffness    Prior Pertinent Surgeries:   2016 L Irrigation and debridement of RUE    Pertinent Endoscopy/Imagin2015 US Abdomen: splenomegaly, attenuating hepatic parenchyma with elevated HRI, suggestive of greater than 5% steatosis   18:  Colonoscopy:  Rectal swab for chlamydia/gonorrhea negative, colon and ileum normal including biopsies throughout.     Pertinent Labs:  Lab Results   Component Value Date    SEDRATE 5 2018    CRP 1.7 2018     Lab Results   Component Value Date    TTGIGA 5 2018     Lab Results   Component Value Date    TSH 2.234 2018    FREET4 0.81 2018     Lab Results   Component Value Date    [id number] 25 (L) 2018     Lab Results   Component Value Date    HEPBSAG Negative 2018    HEPBCAB Negative 2018    HEPCAB Negative 2018     No results found for: NXA62OGHW  Lab Results   Component Value Date    NIL 0.030 2018    MITOGENNIL 9.520 2018     No results found for: TPTMINTERP, TPMTRESULT  Lab Results   Component Value Date     STOOLCULTURE  11/27/2018     No Salmonella,Shigella,Vibrio,Campylobacter,Yersinia isolated.    [id number] Negative 11/27/2018    SISBCLRNTW1I Negative 11/27/2018     Lab Results   Component Value Date    CALPROTECTIN 28.4 11/27/2018       Therapeutic Drug Monitoring Labs:  NA    Prior IBD Therapies:  Atripla  Nexium  Creon  Proctofoam     Current IBD/HIV Therapies:  Atripla  Nexium  Serostim    Vaccinations:  Influenza (inactive): 2017, recommended yearly  Pneumococcal PCV 13: UTD per patient   Pneumococcal PCV 23: UTD per patient  Tetanus (TdaP): 2/13/17  HPV (males and females ages 19-27 yo):        Meningococcal (risk factors- complement component deficiency, spleen damage or splenectomy, HIV, traveling to endemic areas, college student residing in residence weaver,  recruits): recommended   Hepatitis B: immune  Lab Results   Component Value Date    HEPBSAB Positive (A) 11/26/2018     Hepatitis A (risk factors- traveling to high endemic areas, chronic liver disease, clotting factor disorders, MSM, illicit drug users):   immune  Lab Results   Component Value Date    HEPAIGG Positive (A) 11/26/2018     MMR (live vaccine): not sure  Chickenpox status/Varicella (live vaccine): immune  Lab Results   Component Value Date    VARICELLAZOS 3.87 (H) 11/26/2018    VARICELLAINT Positive (A) 11/26/2018       NSAID use/indication:  Yes, aches and pains    Narcotic use:  No    Alternative/Complementary Meds for IBD:  No    Review of Systems   Constitutional: Positive for chills and fever. Negative for weight loss.   HENT:        No oral ulcers, dysphagia, oral thrush   Eyes: Negative for blurred vision, pain and redness.   Respiratory: Positive for cough. Negative for shortness of breath.    Cardiovascular: Negative for chest pain.   Gastrointestinal: Positive for abdominal pain and heartburn. Negative for blood in stool, nausea and vomiting.        Trouble swallowing   Genitourinary: Negative for dysuria and hematuria.  "  Musculoskeletal: Positive for joint pain. Negative for back pain.   Skin: Positive for rash.   Psychiatric/Behavioral: Negative for depression. The patient is not nervous/anxious and does not have insomnia.      All Medical History/Surgical History/Family History/Social History/Allergies have been reviewed and updated in EMR    Review of patient's allergies indicates:   Allergen Reactions    Abacavir Other (See Comments)     HLA B57 positive for hypersensitivity     Outpatient Medications Marked as Taking for the 1/3/19 encounter (Office Visit) with Shaniqua Chawla MD   Medication Sig Dispense Refill    azithromycin (ZITHROMAX) 500 MG tablet Take 1 tablet (500 mg total) by mouth once daily. for 5 days 5 tablet 1    efavirenz-emtrictabine-tenofovir 600-200-300 mg (ATRIPLA) 600-200-300 mg Tab Take 1 tablet by mouth every evening. 30 tablet 0    esomeprazole (NEXIUM) 20 MG capsule Take 20 mg by mouth before breakfast.      SEROSTIM 6 mg SolR        Vital Signs:  BP (!) 163/96 (BP Location: Left arm, Patient Position: Sitting)   Pulse 94 Comment: O2: 96%  Temp 98.4 °F (36.9 °C)   Ht 6' 4" (1.93 m)   Wt 95 kg (209 lb 7 oz)   BMI 25.49 kg/m²     Physical Exam   Constitutional: He is oriented to person, place, and time. He appears well-nourished. No distress.   Well built ; pleasant male , nontoxic   Eyes: Conjunctivae are normal. No scleral icterus.   Neck: Neck supple. No tracheal deviation present.   Cardiovascular: Normal rate and intact distal pulses.   Pulmonary/Chest: Effort normal. No respiratory distress.   Abdominal: Soft. He exhibits no distension. There is no tenderness.   Genitourinary:   Genitourinary Comments: Left inguinal area, below the inguinal canal there is subcutaneous protusion easily reducible, nontender without overlying skin changes suspicous for reducible hernia.   Neurological: He is alert and oriented to person, place, and time.   Skin: Skin is warm and dry.   Psychiatric: He has a " normal mood and affect.   Vitals reviewed.      Labs:   Lab Results   Component Value Date    WBC 6.77 11/26/2018    HGB 19.3 (H) 11/26/2018    HCT 58.8 (H) 11/26/2018    MCV 92 11/26/2018     11/26/2018     Lab Results   Component Value Date    CREATININE 1.3 11/26/2018    ALBUMIN 4.3 11/26/2018    BILITOT 0.4 11/26/2018    ALKPHOS 106 11/26/2018    AST 29 11/26/2018    ALT 22 11/26/2018     Lab Results   Component Value Date    NIL 0.030 11/26/2018    MITOGENNIL 9.520 11/26/2018       Assessment/Plan:  Moisés Koehler is a 43 y.o. male with history of possible ulcerative colitis though this is not supported by current symptoms and colonoscopy.  We don't have the initial colonoscopy done to support this diagnosis.  If there is any symptom change to support this we would be happy to reevaluate first with colonoscopy.  In regards to his frequent stool this may be related to IBS or medications. His most concerning symptom is fecal urgency. We will recommend treatment for IBS with increase in fiber.      # Fecal urgency / loose stools:  - suspected IBS  - trial of tablespoon of benefiber or citrucel twice daily  - a handout was given on FODMAP diet today, but instructed patient to try above measures first.  - stool studies negative for infection (culture, giardia/crypto, O/P), stool elastase and fat stain negative and stool C diff not done due to formed stool  - colonoscopy shows no evidence of active UC and not sure if initial diagnosis was confirmed of UC  - if any symptom changes then stool calprotectin +/- colonoscopy is reasonable    # GERD / dyspepsia  - continue nexium   - add gaviscon prn    # femoral hernia  - gen surgery referral    # Elevated lipase (11/19/18) with no other supporting history for pancreatitis, family history of pancreatic cancer- currently asymptomatic:  - stool elastase and stool fat stain negative  - repeat lipase normal 11/26/18    # Vitamin D deficiency:   - recommend patient  take 1000 IU Vitamin D3 daily and repeat Vitamin D level in 3-6 months  Lab Results   Component Value Date    [id number] 25 (L) 11/26/2018       Follow up: prn, continue f/u with referring MD, Dr. Monty Shepard MD  Gastroenterology Fellow (PGY-VI)  Pager: [phone]    I have reviewed and concur with the fellow's history, physical, assessment, and plan.  I have personally interviewed and examined the patient. The above note has been edited to reflect my recommendations.     Shaniqua Chawla MD  Department of Gastroenterology  Medical Director, Inflammatory Bowel Disease

## 2019-01-04 RX ORDER — EFAVIRENZ, EMTRICITABINE AND TENOFOVIR DISOPROXIL FUMARATE 600; 200; 300 MG/1; MG/1; MG/1
1 TABLET, FILM COATED ORAL NIGHTLY
Qty: 30 TABLET | Refills: 6 | Status: SHIPPED | OUTPATIENT
Start: 2019-01-04 | End: 2019-08-12 | Stop reason: SDUPTHER

## 2019-01-07 ENCOUNTER — PATIENT MESSAGE (OUTPATIENT)
Dept: INFECTIOUS DISEASES | Facility: CLINIC | Age: 44
End: 2019-01-07

## 2019-01-11 ENCOUNTER — ANESTHESIA EVENT (OUTPATIENT)
Dept: SURGERY | Facility: OTHER | Age: 44
End: 2019-01-11
Payer: MEDICARE

## 2019-01-11 ENCOUNTER — HOSPITAL ENCOUNTER (OUTPATIENT)
Dept: PREADMISSION TESTING | Facility: OTHER | Age: 44
Discharge: HOME | End: 2019-01-11
Attending: ORTHOPAEDIC SURGERY
Payer: MEDICARE

## 2019-01-11 ENCOUNTER — OFFICE VISIT (OUTPATIENT)
Dept: SPORTS MEDICINE | Facility: CLINIC | Age: 44
End: 2019-01-11
Payer: MEDICARE

## 2019-01-11 VITALS
HEIGHT: 76 IN | DIASTOLIC BLOOD PRESSURE: 83 MMHG | SYSTOLIC BLOOD PRESSURE: 138 MMHG | TEMPERATURE: 98 F | HEART RATE: 89 BPM | WEIGHT: 209 LBS | OXYGEN SATURATION: 98 % | BODY MASS INDEX: 25.45 KG/M2

## 2019-01-11 VITALS
HEART RATE: 110 BPM | SYSTOLIC BLOOD PRESSURE: 143 MMHG | DIASTOLIC BLOOD PRESSURE: 87 MMHG | WEIGHT: 209 LBS | HEIGHT: 76 IN | BODY MASS INDEX: 25.45 KG/M2

## 2019-01-11 DIAGNOSIS — S43.432A SUPERIOR GLENOID LABRUM LESION OF LEFT SHOULDER, INITIAL ENCOUNTER: Primary | ICD-10-CM

## 2019-01-11 DIAGNOSIS — M24.012 LOOSE BODY OF LEFT SHOULDER: ICD-10-CM

## 2019-01-11 DIAGNOSIS — M75.22 BICEPS TENDINITIS OF LEFT UPPER EXTREMITY: ICD-10-CM

## 2019-01-11 PROCEDURE — 99999 PR PBB SHADOW E&M-EST. PATIENT-LVL III: CPT | Mod: PBBFAC,,, | Performed by: PHYSICIAN ASSISTANT

## 2019-01-11 PROCEDURE — 99999 PR PBB SHADOW E&M-EST. PATIENT-LVL III: ICD-10-PCS | Mod: PBBFAC,,, | Performed by: PHYSICIAN ASSISTANT

## 2019-01-11 PROCEDURE — 99499 NO LOS: ICD-10-PCS | Mod: S$PBB,,, | Performed by: PHYSICIAN ASSISTANT

## 2019-01-11 PROCEDURE — 99499 UNLISTED E&M SERVICE: CPT | Mod: S$PBB,,, | Performed by: PHYSICIAN ASSISTANT

## 2019-01-11 PROCEDURE — 99213 OFFICE O/P EST LOW 20 MIN: CPT | Mod: PBBFAC,PO | Performed by: PHYSICIAN ASSISTANT

## 2019-01-11 RX ORDER — PROMETHAZINE HYDROCHLORIDE 25 MG/1
25 TABLET ORAL EVERY 6 HOURS PRN
Qty: 40 TABLET | Refills: 0 | Status: SHIPPED | OUTPATIENT
Start: 2019-01-11 | End: 2019-02-05

## 2019-01-11 RX ORDER — MUPIROCIN 20 MG/G
OINTMENT TOPICAL
Status: CANCELLED | OUTPATIENT
Start: 2019-01-11

## 2019-01-11 RX ORDER — FAMOTIDINE 20 MG/1
20 TABLET, FILM COATED ORAL
Status: CANCELLED | OUTPATIENT
Start: 2019-01-11 | End: 2019-01-11

## 2019-01-11 RX ORDER — ASPIRIN 325 MG
325 TABLET, DELAYED RELEASE (ENTERIC COATED) ORAL 2 TIMES DAILY
Qty: 42 TABLET | Refills: 0 | Status: SHIPPED | OUTPATIENT
Start: 2019-01-11 | End: 2019-03-26

## 2019-01-11 RX ORDER — LIDOCAINE HYDROCHLORIDE 10 MG/ML
0.5 INJECTION, SOLUTION EPIDURAL; INFILTRATION; INTRACAUDAL; PERINEURAL ONCE
Status: CANCELLED | OUTPATIENT
Start: 2019-01-11 | End: 2019-01-11

## 2019-01-11 RX ORDER — OXYCODONE AND ACETAMINOPHEN 10; 325 MG/1; MG/1
1 TABLET ORAL EVERY 6 HOURS PRN
Qty: 28 TABLET | Refills: 0 | Status: SHIPPED | OUTPATIENT
Start: 2019-01-11 | End: 2019-02-06 | Stop reason: SDUPTHER

## 2019-01-11 RX ORDER — SODIUM CHLORIDE, SODIUM LACTATE, POTASSIUM CHLORIDE, CALCIUM CHLORIDE 600; 310; 30; 20 MG/100ML; MG/100ML; MG/100ML; MG/100ML
INJECTION, SOLUTION INTRAVENOUS CONTINUOUS
Status: CANCELLED | OUTPATIENT
Start: 2019-01-11

## 2019-01-11 RX ORDER — TRAMADOL HYDROCHLORIDE 50 MG/1
50 TABLET ORAL EVERY 6 HOURS PRN
Qty: 40 TABLET | Refills: 0 | Status: SHIPPED | OUTPATIENT
Start: 2019-01-11 | End: 2019-03-26

## 2019-01-11 RX ORDER — OXYCODONE HYDROCHLORIDE 5 MG/1
5 TABLET ORAL ONCE AS NEEDED
Status: CANCELLED | OUTPATIENT
Start: 2019-01-11 | End: 2030-06-09

## 2019-01-11 NOTE — H&P
"CC:  left shoulder pain     HPI:    PLAN OF ACTION: Moisés Koehler  is here for a completion of his perioperative paperwork. he Is scheduled to undergo    left   1. Arthroscopic labral repair vs. debridement  2. Arthroscopic subacromial decompression  3. Arthroscopic rotator cuff debridement with possible rotation medical patch  4. Possible loose body removal  5. Possible open biceps subpectoral tenodesis    on 1/14/19.  He  does need clearance for this procedure. Per Dr. Millan, "He is cleared for shoulder surgery from my standpoint, if ECG is normal."    Risks, indications and benefits of the surgical procedure were discussed with the patient. All questions with regard to surgery, rehab, expected return to functional activities, activities of daily living and recreational endeavors were answered to his satisfaction.    Review of patient's allergies indicates:   Allergen Reactions    Abacavir Other (See Comments)     HLA B57 positive for hypersensitivity    Pt denies       Past Medical History:   Diagnosis Date    AC joint dislocation     Anxiety     Arthritis     Atrial fibrillation     GERD (gastroesophageal reflux disease) 2008    HIV (human immunodeficiency virus infection)     Hypertension     Personal history of malignant neoplasm of pancreas 9/5/2017       Past Surgical History:   Procedure Laterality Date    COLONOSCOPY N/A 12/13/2018    Performed by Shaniqua Chawla MD at [facility] ENDO (4TH FLR)    COSMETIC SURGERY      FRACTURE SURGERY      HERNIA REPAIR      INCISION AND DRAINAGE (I&D), ARM Right 12/27/2016    Performed by Richard Busch MD at [facility] OR 2ND FLR       Social History     Socioeconomic History    Marital status: Single     Spouse name: Not on file    Number of children: Not on file    Years of education: Not on file    Highest education level: Not on file   Social Needs    Financial resource strain: Not on file    Food insecurity - worry: Not on file    Food " "insecurity - inability: Not on file    Transportation needs - medical: Not on file    Transportation needs - non-medical: Not on file   Occupational History    Not on file   Tobacco Use    Smoking status: Never Smoker    Smokeless tobacco: Never Used   Substance and Sexual Activity    Alcohol use: No    Drug use: Yes    Sexual activity: Not Currently     Partners: Female     Birth control/protection: Condom   Other Topics Concern    Not on file   Social History Narrative    Not on file       Family History   Problem Relation Age of Onset    Stomach cancer Mother     Lung cancer Maternal Grandmother     Melanoma Neg Hx     Celiac disease Neg Hx     Cirrhosis Neg Hx     Colon cancer Neg Hx     Colon polyps Neg Hx     Crohn's disease Neg Hx     Cystic fibrosis Neg Hx     Esophageal cancer Neg Hx     Hemochromatosis Neg Hx     Inflammatory bowel disease Neg Hx     Irritable bowel syndrome Neg Hx     Liver cancer Neg Hx     Liver disease Neg Hx     Rectal cancer Neg Hx     Ulcerative colitis Neg Hx     Wilner's disease Neg Hx     Lymphoma Neg Hx     Tuberculosis Neg Hx     Scleroderma Neg Hx     Rheum arthritis Neg Hx     Multiple sclerosis Neg Hx     Lupus Neg Hx     Psoriasis Neg Hx     Skin cancer Neg Hx          Current Outpatient Medications:     efavirenz-emtrictabine-tenofovir 600-200-300 mg (ATRIPLA) 600-200-300 mg Tab, Take 1 tablet by mouth every evening., Disp: 30 tablet, Rfl: 6    esomeprazole (NEXIUM) 20 MG capsule, Take 20 mg by mouth before breakfast., Disp: , Rfl:     ibuprofen (ADVIL,MOTRIN) 200 MG tablet, Take 400 mg by mouth every 6 (six) hours as needed for Pain (Pt takes once a week, rare)., Disp: , Rfl:     insulin syringe-needle U-100 1 mL 30 gauge X 7/16" Syrg, , Disp: , Rfl:     SEROSTIM 6 mg SolR, , Disp: , Rfl:     Please see patient's chart for applicable emotional/behavioral/social status.    REVIEW OF SYSTEMS:  Constitution: Negative. Negative for " chills, fever and night sweats.   HENT: Negative for congestion and headaches.    Eyes: Negative for blurred vision, left vision loss and right vision loss.   Cardiovascular: Negative for chest pain and syncope.   Respiratory: Negative for cough and shortness of breath.    Endocrine: Negative for polydipsia, polyphagia and polyuria.   Hematologic/Lymphatic: Negative for bleeding problem. Does not bruise/bleed easily.   Skin: Negative for dry skin, itching and rash.   Musculoskeletal: Negative for falls. Positive for left shoulder pain and muscle weakness.   Gastrointestinal: Negative for abdominal pain and bowel incontinence.   Genitourinary: Negative for bladder incontinence and nocturia.   Neurological: Negative for disturbances in coordination, loss of balance and seizures.   Psychiatric/Behavioral: Negative for depression. The patient does not have insomnia.    Allergic/Immunologic: Negative for hives and persistent infections.     Once no other questions were asked, a brief history and physical exam was then performed.    PHYSICAL EXAM:  GEN: A&Ox3, WD WN NAD  HEENT: WNL  CHEST: CTAB, no W/R/R  HEART: RRR, no M/R/G  ABD: Soft, NT ND, BS x4 QUADS  MS; See Epic  NEURO: CN II-XII intact       The surgical consent was then reviewed with the patient, who agreed with all the contents of the consent form and it was signed. he was then given the [facility] surgery packet to bring with him to [facility] for the anesthesia portion of his perioperative paperwork.     PHYSICAL THERAPY:  He was also instructed regarding physical therapy and will begin on  POD#3. He was given a copy of the original prescription to schedule. Another copy of this prescription was also faxed to [facility].    POST OP CARE:instructions were reviewed including care of the wound and dressing after surgery and when he can shower.     PAIN MANAGEMENT: Moisés Koehler was also given his pain management regime, which includes the TENS unit given to  him by Brendan Bell along with the education required for its use. He was also instructed regarding the Polar ice unit that will be in place after surgery and his postoperative pain medications.     MEDICATION:  Percocet 10/325mg 1 po q 4-6 hours prn pain  Ultram 50 mg one p.o. q.4-6 hours p.r.n. breakthrough pain,   Phenergan 25 mg one p.o. q.4-6 hours p.r.n. nausea and vomiting.  Colace 100mg BID PRN constipation  EC ASA 325mg BID x 3 weeks  Prilosec OTC    Patient was educated on the signs and symptoms of DVTs as well as the risk of their occurrence.     IMPRESSION: surgical candidate for left   1. Arthroscopic labral repair vs. debridement  2. Arthroscopic subacromial decompression  3. Arthroscopic rotator cuff debridement with possible rotation medical patch  4. Possible loose body removal  5. Possible open biceps subpectoral tenodesis    CONCLUSION: Pre-operative information reviewed with patient and they have voiced their understanding and signed consent. Proceed with surgery as planned.    As there were no other questions to be asked, he was given my business card along with Rakesh العلي MD business card if he has any questions or concerns prior to surgery or in the postop period.

## 2019-01-11 NOTE — ANESTHESIA PREPROCEDURE EVALUATION
01/11/2019  Moisés Koehler is a 43 y.o., male.    Anesthesia Evaluation    I have reviewed the Patient Summary Reports.    I have reviewed the Nursing Notes.   I have reviewed the Medications.     Review of Systems  Anesthesia Hx:  No problems with previous Anesthesia    Social:  Non-Smoker, No Alcohol Use    Hematology/Oncology:  Hematology Normal   Oncology Normal   Hematology Comments: HIV+    EENT/Dental:EENT/Dental Normal   Cardiovascular:   Exercise tolerance: good Hypertension, well controlled Dysrhythmias atrial fibrillation Denies AF   Pulmonary:  Pulmonary Normal    Renal/:  Renal/ Normal     Hepatic/GI:   PUD, (peptic ulcer disease) GERD, well controlled    Musculoskeletal:  Musculoskeletal Normal    Neurological:  Neurology Normal    Endocrine:  Endocrine Normal    Dermatological:  Skin Normal        Physical Exam  General:  Well nourished    Airway/Jaw/Neck:  Airway Findings: Mouth Opening: Normal Tongue: Normal  General Airway Assessment: Adult  Mallampati: I        Eyes/Ears/Nose:  EYES/EARS/NOSE FINDINGS: Normal   Dental:  Dental Findings: In tact   Chest/Lungs:  Chest/Lungs Clear    Heart/Vascular:  Heart Findings: Normal Heart murmur: negative Vascular Findings: Normal    Abdomen:  Abdomen Findings: Normal    Musculoskeletal:  Musculoskeletal Findings: Normal   Skin:  Skin Findings: Normal    Mental Status:  Mental Status Findings: Normal        Anesthesia Plan  Type of Anesthesia, risks & benefits discussed:  Anesthesia Type:  general  Patient's Preference: general  Intra-op Monitoring Plan: standard ASA monitors  Intra-op Monitoring Plan Comments:   Post Op Pain Control Plan: per primary service following discharge from PACU and peripheral nerve block  Post Op Pain Control Plan Comments:   Induction:   IV  Beta Blocker:         Informed Consent: Patient understands risks and  agrees with Anesthesia plan.  Questions answered. Anesthesia consent signed with patient.  ASA Score: 2     Day of Surgery Review of History & Physical:    H&P update referred to the surgeon.     Anesthesia Plan Notes: Medically cleared.        Ready For Surgery From Anesthesia Perspective.

## 2019-01-11 NOTE — H&P (VIEW-ONLY)
"CC:  left shoulder pain     HPI:    PLAN OF ACTION: Moisés Koehler  is here for a completion of his perioperative paperwork. he Is scheduled to undergo    left   1. Arthroscopic labral repair vs. debridement  2. Arthroscopic subacromial decompression  3. Arthroscopic rotator cuff debridement with possible rotation medical patch  4. Possible loose body removal  5. Possible open biceps subpectoral tenodesis    on 1/14/19.  He  does need clearance for this procedure. Per Dr. Millan, "He is cleared for shoulder surgery from my standpoint, if ECG is normal."    Risks, indications and benefits of the surgical procedure were discussed with the patient. All questions with regard to surgery, rehab, expected return to functional activities, activities of daily living and recreational endeavors were answered to his satisfaction.    Review of patient's allergies indicates:   Allergen Reactions    Abacavir Other (See Comments)     HLA B57 positive for hypersensitivity    Pt denies       Past Medical History:   Diagnosis Date    AC joint dislocation     Anxiety     Arthritis     Atrial fibrillation     GERD (gastroesophageal reflux disease) 2008    HIV (human immunodeficiency virus infection)     Hypertension     Personal history of malignant neoplasm of pancreas 9/5/2017       Past Surgical History:   Procedure Laterality Date    COLONOSCOPY N/A 12/13/2018    Performed by Shaniqua Chawla MD at [facility] ENDO (4TH FLR)    COSMETIC SURGERY      FRACTURE SURGERY      HERNIA REPAIR      INCISION AND DRAINAGE (I&D), ARM Right 12/27/2016    Performed by Richard Busch MD at [facility] OR 2ND FLR       Social History     Socioeconomic History    Marital status: Single     Spouse name: Not on file    Number of children: Not on file    Years of education: Not on file    Highest education level: Not on file   Social Needs    Financial resource strain: Not on file    Food insecurity - worry: Not on file    Food " "insecurity - inability: Not on file    Transportation needs - medical: Not on file    Transportation needs - non-medical: Not on file   Occupational History    Not on file   Tobacco Use    Smoking status: Never Smoker    Smokeless tobacco: Never Used   Substance and Sexual Activity    Alcohol use: No    Drug use: Yes    Sexual activity: Not Currently     Partners: Female     Birth control/protection: Condom   Other Topics Concern    Not on file   Social History Narrative    Not on file       Family History   Problem Relation Age of Onset    Stomach cancer Mother     Lung cancer Maternal Grandmother     Melanoma Neg Hx     Celiac disease Neg Hx     Cirrhosis Neg Hx     Colon cancer Neg Hx     Colon polyps Neg Hx     Crohn's disease Neg Hx     Cystic fibrosis Neg Hx     Esophageal cancer Neg Hx     Hemochromatosis Neg Hx     Inflammatory bowel disease Neg Hx     Irritable bowel syndrome Neg Hx     Liver cancer Neg Hx     Liver disease Neg Hx     Rectal cancer Neg Hx     Ulcerative colitis Neg Hx     Wilner's disease Neg Hx     Lymphoma Neg Hx     Tuberculosis Neg Hx     Scleroderma Neg Hx     Rheum arthritis Neg Hx     Multiple sclerosis Neg Hx     Lupus Neg Hx     Psoriasis Neg Hx     Skin cancer Neg Hx          Current Outpatient Medications:     efavirenz-emtrictabine-tenofovir 600-200-300 mg (ATRIPLA) 600-200-300 mg Tab, Take 1 tablet by mouth every evening., Disp: 30 tablet, Rfl: 6    esomeprazole (NEXIUM) 20 MG capsule, Take 20 mg by mouth before breakfast., Disp: , Rfl:     ibuprofen (ADVIL,MOTRIN) 200 MG tablet, Take 400 mg by mouth every 6 (six) hours as needed for Pain (Pt takes once a week, rare)., Disp: , Rfl:     insulin syringe-needle U-100 1 mL 30 gauge X 7/16" Syrg, , Disp: , Rfl:     SEROSTIM 6 mg SolR, , Disp: , Rfl:     Please see patient's chart for applicable emotional/behavioral/social status.    REVIEW OF SYSTEMS:  Constitution: Negative. Negative for " chills, fever and night sweats.   HENT: Negative for congestion and headaches.    Eyes: Negative for blurred vision, left vision loss and right vision loss.   Cardiovascular: Negative for chest pain and syncope.   Respiratory: Negative for cough and shortness of breath.    Endocrine: Negative for polydipsia, polyphagia and polyuria.   Hematologic/Lymphatic: Negative for bleeding problem. Does not bruise/bleed easily.   Skin: Negative for dry skin, itching and rash.   Musculoskeletal: Negative for falls. Positive for left shoulder pain and muscle weakness.   Gastrointestinal: Negative for abdominal pain and bowel incontinence.   Genitourinary: Negative for bladder incontinence and nocturia.   Neurological: Negative for disturbances in coordination, loss of balance and seizures.   Psychiatric/Behavioral: Negative for depression. The patient does not have insomnia.    Allergic/Immunologic: Negative for hives and persistent infections.     Once no other questions were asked, a brief history and physical exam was then performed.    PHYSICAL EXAM:  GEN: A&Ox3, WD WN NAD  HEENT: WNL  CHEST: CTAB, no W/R/R  HEART: RRR, no M/R/G  ABD: Soft, NT ND, BS x4 QUADS  MS; See Epic  NEURO: CN II-XII intact       The surgical consent was then reviewed with the patient, who agreed with all the contents of the consent form and it was signed. he was then given the [facility] [place] surgery packet to bring with him to [facility] [place] for the anesthesia portion of his perioperative paperwork.     PHYSICAL THERAPY:  He was also instructed regarding physical therapy and will begin on  POD#3. He was given a copy of the original prescription to schedule. Another copy of this prescription was also faxed to [facility].    POST OP CARE:instructions were reviewed including care of the wound and dressing after surgery and when he can shower.     PAIN MANAGEMENT: Moisés Koehler was also given his pain management regime, which includes the TENS unit given to  him by Brendan Bell along with the education required for its use. He was also instructed regarding the Polar ice unit that will be in place after surgery and his postoperative pain medications.     MEDICATION:  Percocet 10/325mg 1 po q 4-6 hours prn pain  Ultram 50 mg one p.o. q.4-6 hours p.r.n. breakthrough pain,   Phenergan 25 mg one p.o. q.4-6 hours p.r.n. nausea and vomiting.  Colace 100mg BID PRN constipation  EC ASA 325mg BID x 3 weeks  Prilosec OTC    Patient was educated on the signs and symptoms of DVTs as well as the risk of their occurrence.     IMPRESSION: surgical candidate for left   1. Arthroscopic labral repair vs. debridement  2. Arthroscopic subacromial decompression  3. Arthroscopic rotator cuff debridement with possible rotation medical patch  4. Possible loose body removal  5. Possible open biceps subpectoral tenodesis    CONCLUSION: Pre-operative information reviewed with patient and they have voiced their understanding and signed consent. Proceed with surgery as planned.    As there were no other questions to be asked, he was given my business card along with Rakesh العلي MD business card if he has any questions or concerns prior to surgery or in the postop period.

## 2019-01-11 NOTE — DISCHARGE INSTRUCTIONS
PRE-ADMIT TESTING -  [phone]    [address]  [place] [facility]          Your surgery has been scheduled at [facility]. We are pleased to have the opportunity to serve you. For Further Information please call [phone].    On the day of surgery please report to the Information Desk on the 1st floor.    · CONTACT YOUR PHYSICIAN'S OFFICE THE DAY PRIOR TO YOUR SURGERY TO OBTAIN YOUR ARRIVAL TIME.     · The evening before surgery do not eat anything after 9 p.m. ( this includes hard candy, chewing gum and mints).  You may only have GATORADE, POWERADE AND WATER  from 9 p.m. until you leave your home.   DO NOT DRINK ANY LIQUIDS ON THE WAY TO THE HOSPITAL.      SPECIAL MEDICATION INSTRUCTIONS: TAKE medications checked off by the Anesthesiologist on your Medication List.    Angiogram Patients: Take medications as instructed by your physician, including aspirin.     Surgery Patients:    If you take ASPIRIN - Your PHYSICIAN/SURGEON will need to inform you IF/OR when you need to stop taking aspirin prior to your surgery.     Do Not take any medications containing IBUPROFEN.  Do Not Wear any make-up or dark nail polish   (especially eye make-up) to surgery. If you come to surgery with makeup on you will be required to remove the makeup or nail polish.    Do not shave your surgical area at least 5 days prior to your surgery. The surgical prep will be performed at the hospital according to Infection Control regulations.    Leave all valuables at home.   Do Not wear any jewelry or watches, including any metal in body piercings.  Contact Lens must be removed before surgery. Either do not wear the contact lens or bring a case and solution for storage.  Please bring a container for eyeglasses or dentures as required.  Bring any paperwork your physician has provided, such as consent forms,  history and physicals, doctor's orders, etc.   Bring comfortable clothes that are loose fitting to wear upon  discharge. Take into consideration the type of surgery being performed.  Maintain your diet as advised per your physician the day prior to surgery.      Adequate rest the night before surgery is advised.   Park in the Parking lot behind the hospital or in the [facility] across the street from the parking lot. Parking is complimentary.  If you will be discharged the same day as your procedure, please arrange for a responsible adult to drive you home or to accompany you if traveling by taxi.   YOU WILL NOT BE PERMITTED TO DRIVE OR TO LEAVE THE HOSPITAL ALONE AFTER SURGERY.   It is strongly recommended that you arrange for someone to remain with you for the first 24 hrs following your surgery.       Thank you for your cooperation.  The Staff of [facility].        Bathing Instructions                                                                 Please shower the evening before and morning of your procedure with    ANTIBACTERIAL SOAP. ( DIAL, etc )  Concentrate on the surgical area   for at least 3 minutes and rinse completely. Dry off as usual.   Do not use any deodorant, powder, body lotions, perfume, after shave or    cologne.

## 2019-01-14 ENCOUNTER — HOSPITAL ENCOUNTER (OUTPATIENT)
Facility: OTHER | Age: 44
Discharge: HOME | End: 2019-01-14
Attending: ORTHOPAEDIC SURGERY | Admitting: ORTHOPAEDIC SURGERY
Payer: MEDICARE

## 2019-01-14 ENCOUNTER — ANESTHESIA (OUTPATIENT)
Dept: SURGERY | Facility: OTHER | Age: 44
End: 2019-01-14
Payer: MEDICARE

## 2019-01-14 VITALS
SYSTOLIC BLOOD PRESSURE: 140 MMHG | RESPIRATION RATE: 16 BRPM | BODY MASS INDEX: 25.45 KG/M2 | HEART RATE: 98 BPM | DIASTOLIC BLOOD PRESSURE: 68 MMHG | TEMPERATURE: 98 F | OXYGEN SATURATION: 97 % | WEIGHT: 209 LBS | HEIGHT: 76 IN

## 2019-01-14 DIAGNOSIS — M75.22 BICEPS TENDINITIS OF LEFT UPPER EXTREMITY: ICD-10-CM

## 2019-01-14 DIAGNOSIS — S43.432D SUPERIOR GLENOID LABRUM LESION OF LEFT SHOULDER, SUBSEQUENT ENCOUNTER: Primary | ICD-10-CM

## 2019-01-14 DIAGNOSIS — S43.432A SUPERIOR GLENOID LABRUM LESION OF LEFT SHOULDER, INITIAL ENCOUNTER: ICD-10-CM

## 2019-01-14 DIAGNOSIS — M24.012 LOOSE BODY OF LEFT SHOULDER: ICD-10-CM

## 2019-01-14 PROCEDURE — 37000008 HC ANESTHESIA 1ST 15 MINUTES: Performed by: ORTHOPAEDIC SURGERY

## 2019-01-14 PROCEDURE — 29822 PR SHLDR ARTHROSCOP,PART DEBRIDE: ICD-10-PCS | Mod: 51,LT,GC, | Performed by: ORTHOPAEDIC SURGERY

## 2019-01-14 PROCEDURE — 37000009 HC ANESTHESIA EA ADD 15 MINS: Performed by: ORTHOPAEDIC SURGERY

## 2019-01-14 PROCEDURE — 25000003 PHARM REV CODE 250: Performed by: SPECIALIST

## 2019-01-14 PROCEDURE — 25000003 PHARM REV CODE 250: Performed by: ANESTHESIOLOGY

## 2019-01-14 PROCEDURE — 71000016 HC POSTOP RECOV ADDL HR: Performed by: ORTHOPAEDIC SURGERY

## 2019-01-14 PROCEDURE — 25000003 PHARM REV CODE 250: Performed by: NURSE ANESTHETIST, CERTIFIED REGISTERED

## 2019-01-14 PROCEDURE — 23430 PR REPAIR BICEPS LONG TENDON: ICD-10-PCS | Mod: LT,GC,, | Performed by: ORTHOPAEDIC SURGERY

## 2019-01-14 PROCEDURE — 63600175 PHARM REV CODE 636 W HCPCS: Performed by: ORTHOPAEDIC SURGERY

## 2019-01-14 PROCEDURE — 63600175 PHARM REV CODE 636 W HCPCS: Performed by: PHYSICIAN ASSISTANT

## 2019-01-14 PROCEDURE — 71000033 HC RECOVERY, INTIAL HOUR: Performed by: ORTHOPAEDIC SURGERY

## 2019-01-14 PROCEDURE — 36000710: Performed by: ORTHOPAEDIC SURGERY

## 2019-01-14 PROCEDURE — C1713 ANCHOR/SCREW BN/BN,TIS/BN: HCPCS | Performed by: ORTHOPAEDIC SURGERY

## 2019-01-14 PROCEDURE — 36000711: Performed by: ORTHOPAEDIC SURGERY

## 2019-01-14 PROCEDURE — 63600175 PHARM REV CODE 636 W HCPCS: Performed by: ANESTHESIOLOGY

## 2019-01-14 PROCEDURE — 27201423 OPTIME MED/SURG SUP & DEVICES STERILE SUPPLY: Performed by: ORTHOPAEDIC SURGERY

## 2019-01-14 PROCEDURE — 23430 REPAIR BICEPS TENDON: CPT | Mod: LT,GC,, | Performed by: ORTHOPAEDIC SURGERY

## 2019-01-14 PROCEDURE — 25000003 PHARM REV CODE 250: Performed by: PHYSICIAN ASSISTANT

## 2019-01-14 PROCEDURE — 63600175 PHARM REV CODE 636 W HCPCS: Performed by: NURSE ANESTHETIST, CERTIFIED REGISTERED

## 2019-01-14 PROCEDURE — 29826 PR SHLDR ARTHROSCOP,PART ACROMIOPLAS: ICD-10-PCS | Mod: LT,GC,, | Performed by: ORTHOPAEDIC SURGERY

## 2019-01-14 PROCEDURE — 29822 SHO ARTHRS SRG LMTD DBRDMT: CPT | Mod: 51,LT,GC, | Performed by: ORTHOPAEDIC SURGERY

## 2019-01-14 PROCEDURE — 29826 SHO ARTHRS SRG DECOMPRESSION: CPT | Mod: LT,GC,, | Performed by: ORTHOPAEDIC SURGERY

## 2019-01-14 PROCEDURE — 71000015 HC POSTOP RECOV 1ST HR: Performed by: ORTHOPAEDIC SURGERY

## 2019-01-14 DEVICE — ANCHOR FORKED TIP 7MM PEEI: Type: IMPLANTABLE DEVICE | Site: SHOULDER | Status: FUNCTIONAL

## 2019-01-14 RX ORDER — EPINEPHRINE 1 MG/ML
INJECTION, SOLUTION INTRACARDIAC; INTRAMUSCULAR; INTRAVENOUS; SUBCUTANEOUS
Status: DISCONTINUED | OUTPATIENT
Start: 2019-01-14 | End: 2019-01-14 | Stop reason: HOSPADM

## 2019-01-14 RX ORDER — LIDOCAINE HYDROCHLORIDE 10 MG/ML
0.5 INJECTION, SOLUTION EPIDURAL; INFILTRATION; INTRACAUDAL; PERINEURAL ONCE
Status: DISCONTINUED | OUTPATIENT
Start: 2019-01-14 | End: 2019-01-14 | Stop reason: HOSPADM

## 2019-01-14 RX ORDER — ROPIVACAINE HYDROCHLORIDE 5 MG/ML
INJECTION, SOLUTION EPIDURAL; INFILTRATION; PERINEURAL
Status: COMPLETED | OUTPATIENT
Start: 2019-01-14 | End: 2019-01-14

## 2019-01-14 RX ORDER — CEFAZOLIN SODIUM 1 G/3ML
2 INJECTION, POWDER, FOR SOLUTION INTRAMUSCULAR; INTRAVENOUS
Status: DISCONTINUED | OUTPATIENT
Start: 2019-01-14 | End: 2019-01-14 | Stop reason: HOSPADM

## 2019-01-14 RX ORDER — OXYCODONE HYDROCHLORIDE 5 MG/1
5 TABLET ORAL
Status: DISCONTINUED | OUTPATIENT
Start: 2019-01-14 | End: 2019-01-14 | Stop reason: HOSPADM

## 2019-01-14 RX ORDER — MUPIROCIN 20 MG/G
OINTMENT TOPICAL
Status: DISCONTINUED | OUTPATIENT
Start: 2019-01-14 | End: 2019-01-14 | Stop reason: HOSPADM

## 2019-01-14 RX ORDER — MIDAZOLAM HYDROCHLORIDE 1 MG/ML
2 INJECTION INTRAMUSCULAR; INTRAVENOUS
Status: COMPLETED | OUTPATIENT
Start: 2019-01-14 | End: 2019-01-14

## 2019-01-14 RX ORDER — ACETAMINOPHEN 10 MG/ML
INJECTION, SOLUTION INTRAVENOUS
Status: DISCONTINUED | OUTPATIENT
Start: 2019-01-14 | End: 2019-01-14

## 2019-01-14 RX ORDER — ROCURONIUM BROMIDE 10 MG/ML
INJECTION, SOLUTION INTRAVENOUS
Status: DISCONTINUED | OUTPATIENT
Start: 2019-01-14 | End: 2019-01-14

## 2019-01-14 RX ORDER — PROPOFOL 10 MG/ML
VIAL (ML) INTRAVENOUS
Status: DISCONTINUED | OUTPATIENT
Start: 2019-01-14 | End: 2019-01-14

## 2019-01-14 RX ORDER — DEXAMETHASONE SODIUM PHOSPHATE 4 MG/ML
INJECTION, SOLUTION INTRA-ARTICULAR; INTRALESIONAL; INTRAMUSCULAR; INTRAVENOUS; SOFT TISSUE
Status: DISCONTINUED | OUTPATIENT
Start: 2019-01-14 | End: 2019-01-14

## 2019-01-14 RX ORDER — HYDROMORPHONE HYDROCHLORIDE 2 MG/ML
0.4 INJECTION, SOLUTION INTRAMUSCULAR; INTRAVENOUS; SUBCUTANEOUS EVERY 5 MIN PRN
Status: DISCONTINUED | OUTPATIENT
Start: 2019-01-14 | End: 2019-01-14 | Stop reason: HOSPADM

## 2019-01-14 RX ORDER — ONDANSETRON 2 MG/ML
INJECTION INTRAMUSCULAR; INTRAVENOUS
Status: DISCONTINUED | OUTPATIENT
Start: 2019-01-14 | End: 2019-01-14

## 2019-01-14 RX ORDER — SODIUM CHLORIDE 0.9 % (FLUSH) 0.9 %
5 SYRINGE (ML) INJECTION
Status: DISCONTINUED | OUTPATIENT
Start: 2019-01-14 | End: 2019-01-14 | Stop reason: HOSPADM

## 2019-01-14 RX ORDER — FAMOTIDINE 20 MG/1
20 TABLET, FILM COATED ORAL
Status: COMPLETED | OUTPATIENT
Start: 2019-01-14 | End: 2019-01-14

## 2019-01-14 RX ORDER — LIDOCAINE HCL/PF 100 MG/5ML
SYRINGE (ML) INTRAVENOUS
Status: DISCONTINUED | OUTPATIENT
Start: 2019-01-14 | End: 2019-01-14

## 2019-01-14 RX ORDER — TRAMADOL HYDROCHLORIDE 50 MG/1
50 TABLET ORAL ONCE
Status: DISCONTINUED | OUTPATIENT
Start: 2019-01-14 | End: 2019-01-14 | Stop reason: HOSPADM

## 2019-01-14 RX ORDER — METOCLOPRAMIDE HYDROCHLORIDE 5 MG/ML
5 INJECTION INTRAMUSCULAR; INTRAVENOUS EVERY 6 HOURS PRN
Status: CANCELLED | OUTPATIENT
Start: 2019-01-14

## 2019-01-14 RX ORDER — ONDANSETRON 2 MG/ML
4 INJECTION INTRAMUSCULAR; INTRAVENOUS DAILY PRN
Status: DISCONTINUED | OUTPATIENT
Start: 2019-01-14 | End: 2019-01-14 | Stop reason: HOSPADM

## 2019-01-14 RX ORDER — SODIUM CHLORIDE, SODIUM LACTATE, POTASSIUM CHLORIDE, CALCIUM CHLORIDE 600; 310; 30; 20 MG/100ML; MG/100ML; MG/100ML; MG/100ML
INJECTION, SOLUTION INTRAVENOUS CONTINUOUS
Status: DISCONTINUED | OUTPATIENT
Start: 2019-01-14 | End: 2019-01-14 | Stop reason: HOSPADM

## 2019-01-14 RX ORDER — GLYCOPYRROLATE 0.2 MG/ML
INJECTION INTRAMUSCULAR; INTRAVENOUS
Status: DISCONTINUED | OUTPATIENT
Start: 2019-01-14 | End: 2019-01-14

## 2019-01-14 RX ORDER — ONDANSETRON 8 MG/1
8 TABLET, ORALLY DISINTEGRATING ORAL EVERY 8 HOURS PRN
Status: CANCELLED | OUTPATIENT
Start: 2019-01-14

## 2019-01-14 RX ORDER — CYCLOBENZAPRINE HCL 10 MG
10 TABLET ORAL ONCE
Status: DISCONTINUED | OUTPATIENT
Start: 2019-01-14 | End: 2019-01-14 | Stop reason: HOSPADM

## 2019-01-14 RX ORDER — FENTANYL CITRATE 50 UG/ML
INJECTION, SOLUTION INTRAMUSCULAR; INTRAVENOUS
Status: DISCONTINUED | OUTPATIENT
Start: 2019-01-14 | End: 2019-01-14

## 2019-01-14 RX ORDER — FENTANYL CITRATE 50 UG/ML
100 INJECTION, SOLUTION INTRAMUSCULAR; INTRAVENOUS EVERY 5 MIN PRN
Status: COMPLETED | OUTPATIENT
Start: 2019-01-14 | End: 2019-01-14

## 2019-01-14 RX ORDER — FENTANYL CITRATE 50 UG/ML
25 INJECTION, SOLUTION INTRAMUSCULAR; INTRAVENOUS EVERY 5 MIN PRN
Status: COMPLETED | OUTPATIENT
Start: 2019-01-14 | End: 2019-01-14

## 2019-01-14 RX ORDER — OXYCODONE HYDROCHLORIDE 5 MG/1
5 TABLET ORAL ONCE AS NEEDED
Status: DISCONTINUED | OUTPATIENT
Start: 2019-01-14 | End: 2019-01-14 | Stop reason: HOSPADM

## 2019-01-14 RX ORDER — HYDROCODONE BITARTRATE AND ACETAMINOPHEN 5; 325 MG/1; MG/1
1 TABLET ORAL EVERY 4 HOURS PRN
Status: CANCELLED | OUTPATIENT
Start: 2019-01-14

## 2019-01-14 RX ORDER — NEOSTIGMINE METHYLSULFATE 1 MG/ML
INJECTION, SOLUTION INTRAVENOUS
Status: DISCONTINUED | OUTPATIENT
Start: 2019-01-14 | End: 2019-01-14

## 2019-01-14 RX ORDER — MEPERIDINE HYDROCHLORIDE 50 MG/ML
12.5 INJECTION INTRAMUSCULAR; INTRAVENOUS; SUBCUTANEOUS ONCE AS NEEDED
Status: DISCONTINUED | OUTPATIENT
Start: 2019-01-14 | End: 2019-01-14 | Stop reason: HOSPADM

## 2019-01-14 RX ORDER — OXYCODONE HYDROCHLORIDE 5 MG/1
10 TABLET ORAL EVERY 4 HOURS PRN
Status: CANCELLED | OUTPATIENT
Start: 2019-01-14

## 2019-01-14 RX ORDER — SODIUM CHLORIDE 0.9 % (FLUSH) 0.9 %
3 SYRINGE (ML) INJECTION
Status: DISCONTINUED | OUTPATIENT
Start: 2019-01-14 | End: 2019-01-14 | Stop reason: HOSPADM

## 2019-01-14 RX ADMIN — ACETAMINOPHEN 1000 MG: 10 INJECTION, SOLUTION INTRAVENOUS at 12:01

## 2019-01-14 RX ADMIN — ONDANSETRON 4 MG: 2 INJECTION INTRAMUSCULAR; INTRAVENOUS at 02:01

## 2019-01-14 RX ADMIN — FAMOTIDINE 20 MG: 20 TABLET, FILM COATED ORAL at 10:01

## 2019-01-14 RX ADMIN — ROCURONIUM BROMIDE 10 MG: 10 INJECTION INTRAVENOUS at 12:01

## 2019-01-14 RX ADMIN — MIDAZOLAM HYDROCHLORIDE 4 MG: 1 INJECTION, SOLUTION INTRAMUSCULAR; INTRAVENOUS at 11:01

## 2019-01-14 RX ADMIN — CEFAZOLIN 2 G: 330 INJECTION, POWDER, FOR SOLUTION INTRAMUSCULAR; INTRAVENOUS at 12:01

## 2019-01-14 RX ADMIN — FENTANYL CITRATE 100 MCG: 50 INJECTION, SOLUTION INTRAMUSCULAR; INTRAVENOUS at 12:01

## 2019-01-14 RX ADMIN — FENTANYL CITRATE 25 MCG: 50 INJECTION, SOLUTION INTRAMUSCULAR; INTRAVENOUS at 03:01

## 2019-01-14 RX ADMIN — DEXAMETHASONE SODIUM PHOSPHATE 8 MG: 4 INJECTION, SOLUTION INTRAMUSCULAR; INTRAVENOUS at 12:01

## 2019-01-14 RX ADMIN — LIGHT MINERAL OIL, WHITE PETROLATUM 1 TUBE: 150; 850 OINTMENT OPHTHALMIC at 12:01

## 2019-01-14 RX ADMIN — SODIUM CHLORIDE, SODIUM LACTATE, POTASSIUM CHLORIDE, AND CALCIUM CHLORIDE: 600; 310; 30; 20 INJECTION, SOLUTION INTRAVENOUS at 11:01

## 2019-01-14 RX ADMIN — MUPIROCIN: 20 OINTMENT TOPICAL at 10:01

## 2019-01-14 RX ADMIN — ROPIVACAINE HYDROCHLORIDE 25 ML: 5 INJECTION, SOLUTION EPIDURAL; INFILTRATION; PERINEURAL at 11:01

## 2019-01-14 RX ADMIN — PROPOFOL 200 MG: 10 INJECTION, EMULSION INTRAVENOUS at 12:01

## 2019-01-14 RX ADMIN — ROCURONIUM BROMIDE 50 MG: 10 INJECTION INTRAVENOUS at 12:01

## 2019-01-14 RX ADMIN — SODIUM CHLORIDE, SODIUM LACTATE, POTASSIUM CHLORIDE, AND CALCIUM CHLORIDE: 600; 310; 30; 20 INJECTION, SOLUTION INTRAVENOUS at 01:01

## 2019-01-14 RX ADMIN — GLYCOPYRROLATE 0.8 MG: 0.2 INJECTION, SOLUTION INTRAMUSCULAR; INTRAVENOUS at 02:01

## 2019-01-14 RX ADMIN — OXYCODONE HYDROCHLORIDE 5 MG: 5 TABLET ORAL at 03:01

## 2019-01-14 RX ADMIN — LIDOCAINE HYDROCHLORIDE 80 MG: 20 INJECTION, SOLUTION INTRAVENOUS at 12:01

## 2019-01-14 RX ADMIN — FENTANYL CITRATE 100 MCG: 50 INJECTION, SOLUTION INTRAMUSCULAR; INTRAVENOUS at 11:01

## 2019-01-14 RX ADMIN — NEOSTIGMINE METHYLSULFATE 5 MG: 1 INJECTION INTRAVENOUS at 02:01

## 2019-01-14 NOTE — INTERVAL H&P NOTE
The patient has been examined and the H&P has been reviewed:    I concur with the findings and no changes have occurred since H&P was written.    Anesthesia/Surgery risks, benefits and alternative options discussed and understood by patient/family.          Active Hospital Problems    Diagnosis  POA    Superior glenoid labrum lesion of left shoulder [S40.873A]  Yes      Resolved Hospital Problems   No resolved problems to display.

## 2019-01-14 NOTE — ANESTHESIA PROCEDURE NOTES
Peripheral Block    Patient location during procedure: holding area   Block not for primary anesthetic.  Reason for block: at surgeon's request and post-op pain management   Post-op Pain Location: Left shoulder  Timeout: 1/14/2019 11:42 AM   End time: 1/14/2019 11:54 AM  Staffing  Anesthesiologist: Rakesh Gomez MD  Performed: anesthesiologist   Preanesthetic Checklist  Completed: patient identified, site marked, surgical consent, pre-op evaluation, timeout performed, IV checked, risks and benefits discussed and monitors and equipment checked  Peripheral Block  Patient position: right lateral decubitus  Prep: ChloraPrep and site prepped and draped  Patient monitoring: heart rate, cardiac monitor, continuous pulse ox and frequent blood pressure checks  Block type: interscalene  Laterality: left  Injection technique: single shot  Needle  Needle type: Echogenic   Needle gauge: 21 G  Needle length: 4 in  Needle localization: ultrasound guidance and anatomical landmarks   -ultrasound image captured on disc.  Assessment  Injection assessment: negative aspiration, negative parasthesia and local visualized surrounding nerve  Paresthesia pain: none  Heart rate change: no  Slow fractionated injection: yes

## 2019-01-14 NOTE — TRANSFER OF CARE
"Anesthesia Transfer of Care Note    Patient: Moisés Koehler    Procedure(s) Performed: Procedure(s) (LRB):  DEBRIDEMENT SLAP, SHOULDER, ARTHROSCOPIC (Left)  ARTHROSCOPY, SHOULDER, WITH SUBACROMIAL DECOMPRESSION (Left)  OPEN BICEPS SUBPECTORALIS TENODESIS (Left)    Patient location: PACU    Anesthesia Type: general    Transport from OR: Transported from OR on 2-3 L/min O2 by NC with adequate spontaneous ventilation    Post pain: adequate analgesia    Post assessment: no apparent anesthetic complications and tolerated procedure well    Post vital signs: stable    Level of consciousness: awake    Nausea/Vomiting: no nausea/vomiting    Complications: none    Transfer of care protocol was followed      Last vitals:   Visit Vitals  BP (!) 140/85 (BP Location: Right arm, Patient Position: Lying)   Pulse 88   Temp 36.9 °C (98.5 °F) (Oral)   Resp 18   Ht 6' 4" (1.93 m)   Wt 94.8 kg (209 lb 0 oz)   SpO2 97%   BMI 25.44 kg/m²     "

## 2019-01-14 NOTE — ANESTHESIA POSTPROCEDURE EVALUATION
"Anesthesia Post Evaluation    Patient: Moisés Koehler    Procedure(s) Performed: Procedure(s) (LRB):  DEBRIDEMENT SLAP, SHOULDER, ARTHROSCOPIC (Left)  ARTHROSCOPY, SHOULDER, WITH SUBACROMIAL DECOMPRESSION (Left)  OPEN BICEPS SUBPECTORALIS TENODESIS (Left)    Final Anesthesia Type: general  Patient location during evaluation: PACU  Patient participation: Yes- Able to Participate  Level of consciousness: awake and alert  Post-procedure vital signs: reviewed and stable  Pain management: adequate  Airway patency: patent  PONV status at discharge: No PONV  Anesthetic complications: no      Cardiovascular status: blood pressure returned to baseline  Respiratory status: unassisted and room air  Hydration status: euvolemic  Follow-up not needed.        Visit Vitals  BP (!) 176/84   Pulse 91   Temp 36.6 °C (97.8 °F) (Oral)   Resp 17   Ht 6' 4" (1.93 m)   Wt 94.8 kg (209 lb 0 oz)   SpO2 100%   BMI 25.44 kg/m²       Pain/Ekaterina Score: No Data Recorded      "

## 2019-01-14 NOTE — DISCHARGE SUMMARY
[facility]    Brief Operative Note     SUMMARY     Surgery Date: 1/14/2019     Surgeon(s) and Role:     * Rakesh العلي MD - Primary    Assisting Surgeon: None    Pre-op Diagnosis:  Superior glenoid labrum lesion of left shoulder, initial encounter [S43.432A]  Biceps tendinitis of left upper extremity [M75.22]  Loose body of left shoulder [M24.012]    Post-op Diagnosis:  Post-Op Diagnosis Codes:     * Superior glenoid labrum lesion of left shoulder, initial encounter [S43.432A]     * Biceps tendinitis of left upper extremity [M75.22]     * Loose body of left shoulder [M24.012]    Procedure: Procedure(s) (LRB):  DEBRIDEMENT SLAP, SHOULDER, ARTHROSCOPIC (Left)  ARTHROSCOPY, SHOULDER, WITH SUBACROMIAL DECOMPRESSION (Left)  OPEN BICEPS SUBPECTORALIS TENODESIS (Left)    Anesthesia: General    Description of the findings of the procedure: See Dictation    Findings/Key Components: left shoulder arthroscopic labral debridement, open subpect biceps tenodesis    Estimated Blood Loss: * No values recorded between 1/14/2019 12:57 PM and 1/14/2019  2:42 PM *         Specimens:   Specimen (12h ago, onward)    None          Disposition: Patient tolerated the procedure well and was transferred to PACU in stable condition.      Discharge Note    SUMMARY     Admit Date: 1/14/2019    Discharge Date and Time:   01/14/2019 2:42 PM    Pre-op Diagnosis:  Superior glenoid labrum lesion of left shoulder, initial encounter [S43.432A]  Biceps tendinitis of left upper extremity [M75.22]  Loose body of left shoulder [M24.012]    Post-op Diagnosis:  Post-Op Diagnosis Codes:     * Superior glenoid labrum lesion of left shoulder, initial encounter [S43.432A]     * Biceps tendinitis of left upper extremity [M75.22]     * Loose body of left shoulder [M24.012]    Procedure: Procedure(s) (LRB):  DEBRIDEMENT SLAP, SHOULDER, ARTHROSCOPIC (Left)  ARTHROSCOPY, SHOULDER, WITH SUBACROMIAL DECOMPRESSION (Left)  OPEN BICEPS SUBPECTORALIS  "TENODESIS (Left)    Hospital Course (synopsis of major diagnoses, care, treatment, and services provided during the course of the hospital stay): left shoulder arthroscopic labral debridement, open subpect biceps tenodesis     Final Diagnosis: Post-Op Diagnosis Codes:     * Superior glenoid labrum lesion of left shoulder, initial encounter [S43.432A]     * Biceps tendinitis of left upper extremity [M75.22]     * Loose body of left shoulder [M24.012]    Disposition: Home or Self Care    Follow Up/Patient Instructions:     Medications:  Reconciled Home Medications:      Medication List      CONTINUE taking these medications    aspirin 325 MG EC tablet  Commonly known as:  ECOTRIN  Take 1 tablet (325 mg total) by mouth 2 (two) times daily. Take an antacid with medication. for 42 doses     ATRIPLA 600-200-300 mg Tab  Generic drug:  efavirenz-emtrictabine-tenofovir 600-200-300 mg  Take 1 tablet by mouth every evening.     esomeprazole 20 MG capsule  Commonly known as:  NEXIUM  Take 20 mg by mouth before breakfast.     FIBER CHOICE ORAL  Take by mouth.     ibuprofen 200 MG tablet  Commonly known as:  ADVIL,MOTRIN  Take 400 mg by mouth every 6 (six) hours as needed for Pain (Pt takes once a week, rare).     insulin syringe-needle U-100 1 mL 30 gauge X 7/16" Syrg     oxyCODONE-acetaminophen  mg per tablet  Commonly known as:  PERCOCET  Take 1 tablet by mouth every 6 (six) hours as needed. Take stool softener with this medication     promethazine 25 MG tablet  Commonly known as:  PHENERGAN  Take 1 tablet (25 mg total) by mouth every 6 (six) hours as needed for Nausea.     SEROSTIM 6 mg Solr  Generic drug:  somatropin     traMADol 50 mg tablet  Commonly known as:  ULTRAM  Take 1 tablet (50 mg total) by mouth every 6 (six) hours as needed for Pain.          Discharge Procedure Orders   Diet general     Call MD for:  temperature >100.4     Call MD for:  persistent nausea and vomiting     Call MD for:  severe uncontrolled " pain     Call MD for:  difficulty breathing, headache or visual disturbances     Call MD for:  redness, tenderness, or signs of infection (pain, swelling, redness, odor or green/yellow discharge around incision site)     Call MD for:  hives     Call MD for:  persistent dizziness or light-headedness     Call MD for:  extreme fatigue     Remove dressing in 72 hours     Lifting restrictions

## 2019-01-15 ENCOUNTER — TELEPHONE (OUTPATIENT)
Dept: SPORTS MEDICINE | Facility: CLINIC | Age: 44
End: 2019-01-15

## 2019-01-15 NOTE — PLAN OF CARE
Moisés Koehler has met all discharge criteria from Phase II. Vital Signs are stable, ambulating  without difficulty.Pain is now under control and tolerable for the pt. Pain score 6 at this time.  Discharge instructions given, patient verbalized understanding. Discharged from facility via wheelchair in stable condition.

## 2019-01-15 NOTE — OR NURSING
C/o Axilla pain 6/10. States he would like to take pain med prior to discharge.  Dr Gomez notified and orders for lyrica and ultram received.

## 2019-01-15 NOTE — TELEPHONE ENCOUNTER
----- Message from Salomón Tejeda sent at 1/15/2019  4:07 PM CST -----  Contact: Kenny - Jihan Mayo' ask if patient have to sleep in sling Gael' can be reach at

## 2019-01-15 NOTE — TELEPHONE ENCOUNTER
LVM informing Jihan that patient do need to stay in his sling for atleast 4 weeks so he will have to sleep in it.

## 2019-01-17 ENCOUNTER — CLINICAL SUPPORT (OUTPATIENT)
Dept: REHABILITATION | Facility: HOSPITAL | Age: 44
End: 2019-01-17
Attending: ORTHOPAEDIC SURGERY
Payer: MEDICARE

## 2019-01-17 DIAGNOSIS — M25.512 CHRONIC LEFT SHOULDER PAIN: Primary | ICD-10-CM

## 2019-01-17 DIAGNOSIS — G89.29 CHRONIC LEFT SHOULDER PAIN: Primary | ICD-10-CM

## 2019-01-17 DIAGNOSIS — M25.60 RANGE OF MOTION DEFICIT: ICD-10-CM

## 2019-01-17 DIAGNOSIS — R53.1 WEAKNESS: ICD-10-CM

## 2019-01-17 PROCEDURE — 97161 PT EVAL LOW COMPLEX 20 MIN: CPT

## 2019-01-17 PROCEDURE — 97110 THERAPEUTIC EXERCISES: CPT

## 2019-01-17 NOTE — OP NOTE
DATE OF SURGERY:  1/14/19     PREOPERATIVE DIAGNOSES:   1. Left shoulder biceps tendinopathy   2. Left shoulder SLAP tear  3. Left shoulder glenohumeral degenerative arthritis    POSTOPERATIVE DIAGNOSES:   1. Left shoulder biceps tendinopathy   2. Left shoulder SLAP tear  3. Left shoulder glenohumeral degenerative arthritis    PROCEDURE:   1. Left shoulder open subpectoral biceps tenodesis   2. Left shoulder arthroscopic debridement labrum  3. Left shoulder arthroscopic chondroplasty glenohumeral joint  4. Left shoulder arthroscopic subacromial decompression.     SURGEON: Rakesh العلي M.D.     ASSISTANTS:   1. Burt Matos M.D.   2. Jayda De La Cruz    COMPLICATIONS: None.     POSITION: Beach chair.     ANESTHESIA: General endotracheal plus left upper extremity interscalene block.     EXAMINATION UNDER ANESTHESIA: Left shoulder forward flexion 180 degrees,   abduction 120 degrees, full internal and external rotation   1+ anterior drawer, 1+ sulcus sign, 1+ posterior drawer.     ARTHROSCOPIC FINDINGS:   1. Intact rotator cuff.   2. Small anterior acromial spur.   3. S/p AC separation with elevated distal clavicle  4. Grade 3/4 extensive chondromalacia glenohumeral cartilage surface  5. Biceps: insertional tendinopathy  6. Subscapularis: frayed  7. Labrum:  Degenerative SLAP1, posterior labrum tear      INDICATIONS FOR PROCEDURE: The patient is a 43 y.o.  male former  who has pain in his left shoulder. MRI confirms a tear of his labrum.  After risks and benefits of surgery were discussed, he elects to proceed with operative  intervention. All risks and benefits have been discussed including the risks of stiffness for recurrent instability, irreparability of the tear, damage to neurovascular structures, damage to cartilage and the risk of anesthesia including stroke and heart attack. The patient seemed to understand and wished to proceed.     DESCRIPTION OF PROCEDURE: The patient was brought in  the room. After undergoing general endotracheal anesthesia and left upper extremity interscalene block, he was placed in a well-padded modified beachchair position. Perioperative antibiotics were given.  his left upper extremity was prepped and draped in usual sterile fashion including the use of a sterile Spider arm morel.     After time-out was performed, the standard posterior portal and anterior superior portal were created. Diagnostic arthroscopy performed. The glenohumeral joint was entered. There was no chondromalacia noted in the glenoid or humeral head. There was minimal mild fraying at the superior labrum. The anterior inferior labrum and posterior labrum were intact without evidence of tearing. The subscapularis had mild fraying that was debrided down.  The remainder of the insertion was intact.    The supraspinatus tendon was intact.     DEBRIDEMENT: Oscillating shaver, straight and curved biters were used to debride a small flap of tissue off the superior labrum and posterior labrum.  The biceps was released for planned open tenodesis.    CHONDROPLASTY: Mechanical chondroplasty of loose flaps along the glenohumeral cartilage surface was performed to removed abrading loose cartilage flaps and unstable areas of cartilage along the mid portion of the humeral head and glenoid.    SUBACROMIAL DECOMPRESSION: The scope was taken out and redirected in the subacromial space. After excellent visualization was achieved, a lateral portal was created. The bursal tissue was cleaned off. The full-thickness crescent-shaped medium size rotator cuff tear was identified. The area was cleaned off for later repair. The undersurface of the acromion was cleaned off of soft tissues including releasing the CA ligament and smoothing out the undersurface of the acromion.     OPEN SUBPECTORAL BICEPS TENODESIS: The scope was taken out of the joint.  A 3 cm incision was made in the axillary fold for our open subpec biceps  tenodesis.  Excellent hemostasis was achieved.  Blunt dissection was taken down to the fascia.  The fascia was released.  A pointed Chay was placed laterally under the pec.  A blunt Timbo was placed medially to protect the neurovascular structures.  An Army-Navy was placed superiorly to complete exposure. Excellent visualization of the biceps groove and biceps tendon was achieved.  The tendon was brought into the wound with a right angle hemostat.  Frayed and tendinopathic aspect were debrided down.  An Arthrex Fiberloop suture started at the musculotendinous junction and was sutured several cm distally.  The excess tendon was removed and excellent control of our biceps was achieved.  A guide wire and 7 mm  hole was drilled into the humerus in the sub-pec position.  Excess bone debris was removed.  An Arthrex 7x15 mm PEEK bio-tenodesis screw was placed along with the tendon into the drilled hole without difficulty.  Excellent purchase was achieved and the sutures were tied to provide a suture anchor effect as well.  The correct length-tension relationship was restored for the biceps as the muscle tendon junction rested directly deep to the pectoralis major.  All areas were irrigated.  The wound was closed with 2-0 vicryl and 4-0 subcuticular monocryl.      Portal sites were closed with 4-0 Monocryl, covered with Mastisol, Steri-Strips, Xeroform, 4 x 4 fluffs, ABD pads and tape. The patient was placed in a sling and pillow for protection, was extubated in the room, transferred to recovery room in stable condition accompanied by his physician.    There were no complications in the case. I was present, scrubbed and did perform all critical portions of the case.     Postop plan for the patient is to follow the subpec tenodesis protocol.         Rakesh العلي M.D.

## 2019-01-17 NOTE — PROGRESS NOTES
[facility]  Physical Therapy Initial Evaluation    Name: Moisés Koehler  Clinic Number: [id number]    Therapy Diagnosis:   Encounter Diagnoses   Name Primary?    Chronic left shoulder pain Yes    Range of motion deficit     Weakness      Physician: Rakesh العلي MD    Physician Orders: PT Eval and Treat   subpec tenodesis protocol    Medical Diagnosis from Referral: M25.512,G89.29 (ICD-10-CM) - Chronic left shoulder pain  Surgery Date: 1/14/2019  Evaluation Date: 1/17/2019  Authorization Period Expiration: 1/23/2020  Plan of Care Expiration: 4/12/2019  Visit # / Visits authorized: 1/ 1    Time In: 10:00  Time Out: 10:45  Total Billable Time: 45 minutes    Precautions: HIV positive    Subjective   Date of onset: Original injury = 2003   History of current condition - Moisés reports: he was a  injured in a match. 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 He states he knew there was damage but didn't realize how much - he would push through the pain until it was too much for him to tolerate.        Past Medical History:   Diagnosis Date    AC joint dislocation     Arthritis     GERD (gastroesophageal reflux disease) 2008    HIV (human immunodeficiency virus infection)     Hypertension      Moisés Koehler  has a past surgical history that includes Cosmetic surgery; Hernia repair; Fracture surgery; Colonoscopy (N/A, 12/13/2018); Knee surgery; arthroscopic debridement of shoulder (Left, 1/14/2019); arthroscopy of shoulder with decompression of subacromial space (Left, 1/14/2019); and fixation of tendon (Left, 1/14/2019).    Moisés has a current medication list which includes the following prescription(s): aspirin, efavirenz-emtrictabine-tenofovir 600-200-300 mg, esomeprazole, fiber, ibuprofen, insulin syringe-needle u-100, oxycodone-acetaminophen, promethazine, serostim, and tramadol.    Review of patient's allergies  indicates:  No Known Allergies     Imaging, MRI studies:   FINDINGS:  Rotator cuff: Infraspinatus, teres minor, and subscapularis tendons are intact.  Supraspinatus tendinosis and low-grade undersurface fraying noted.  Muscle bulk is maintained.    Labrum: There is circumferential tear of the glenoid labrum.    Biceps: There is tendinosis of the intra-articular biceps tendon with small interstitial tear.    Bone: There is no acute fracture.  Bone marrow signal is unremarkable.    Acromioclavicular joint: Remote nonunited distal clavicular fracture noted.    Cartilage: There is extensive full-thickness cartilage loss throughout the glenoid and humeral head with subchondral edema and cystic change.    Miscellaneous: There is intra-articular synovitis/debris.      Impression       Severe glenohumeral osteoarthritis.    Circumferential tear of the glenoid labrum.    Long head biceps tendinosis and interstitial tear.    Supraspinatus tendinosis and low-grade undersurface fraying.    Remote nonunited distal clavicular fracture.    Glenohumeral joint synovitis/debris.         Prior Therapy: None for this condition.   Social History: He lives with his [facility]   Occupation: Owner of [employer]; .   Prior Level of Function: Independent with pain.   Current Level of Function: Significant mobility limitations due to sling.     Pain:  Current 3/10, worst 8/10, best 0/10 at rest  Location: left shoulder   Description: Burning  Aggravating Factors: positioning that aggravates shoulder or puts pressure on elbow.   Easing Factors: ice, TENS unit and rest    Pts goals: to be able to workout and be comfortable. No pain with lifting regular objects.     Objective     Observation: Pt is stated age, pleasant, no acute distress, oriented x3.       Active Range of Motion: Measured in degrees    Shoulder Left Right   Flexion    Abduction    ER at 90    IR at 90 NT 70   Functional ER: (R) T2  Functional  IR: (R) T9       Passive Range of Motion: Measured in degrees    Shoulder Left Right   Flexion 90 NT   Abduction 90 NT   ER at 0 20 NT   IR 70 NT         Upper Extremity Strength    Shoulder Left Right   Shoulder flexion: NT 5/5   Shoulder Abduction: NT 5/5   Shoulder ER NT 5/5   Shoulder IR NT 5/5       Special Tests:   Not indicated, patient is post-op      CMS Impairment/Limitation/Restriction for FOTO Shoulder Survey    Therapist reviewed FOTO scores for Moisés Koehler on 1/17/2019.   FOTO documents entered into [employer] [facility] - see Media section.    Limitation Score: 50%  Category: Other    Current : CK = at least 40% but < 60% impaired, limited or restricted  Goal: CI = at least 1% but < 20% impaired, limited or restricted         TREATMENT   Treatment Time In: 11:30  Treatment Time Out: 11:45  Total Treatment time separate from Evaluation: 15 minutes    Moisés received therapeutic exercises to develop posture for 15 minutes including:  - review of shoulder shrugs, scap retractions for HEP  .    Home Exercises and Patient Education Provided    Education provided:   - Findings of evaluation, prognosis, PT plan of care, HEP      Written Home Exercises Provided: no; see TherEx above.  Exercises were reviewed and Moisés was able to demonstrate them prior to the end of the session.  Moisés demonstrated good  understanding of the education provided.       Assessment   Moisés is a 43 y.o. male referred to outpatient Physical Therapy with a medical diagnosis of M25.512,G89.29 (ICD-10-CM) - Chronic left shoulder pain. Pt presents with significant limitations in mobility of LUE due to post-op status and use of sling per protocol. Strength deficits also present due to long-history of injury and dysfunction of LUE and subsequent immobilization. Patient's career requires him to maintain high level of fitness and therefore requires skilled intervention to facilitate optimal recovery from surgery.     Pt prognosis is  Good.   Pt will benefit from skilled outpatient Physical Therapy to address the deficits stated above and in the chart below, provide pt/family education, and to maximize pt's level of independence.     Plan of care discussed with patient: Yes  Pt's spiritual, cultural and educational needs considered and patient is agreeable to the plan of care and goals as stated below:     Anticipated Barriers for therapy: None    Medical Necessity is demonstrated by the following  History  Co-morbidities and personal factors that may impact the plan of care Co-morbidities:   HIV/AIDS and HTN    Personal Factors:   social background     moderate   Examination  Body Structures and Functions, activity limitations and participation restrictions that may impact the plan of care Body Regions:   upper extremities    Body Systems:    ROM  strength  motor control    Participation Restrictions:   Lifting, carrying, push/pull, exercise    Activity limitations:   Learning and applying knowledge  no deficits    General Tasks and Commands  no deficits    Communication  no deficits    Mobility  lifting and carrying objects  fine hand use (grasping/picking up)    Self care  washing oneself (bathing, drying, washing hands)  caring for body parts (brushing teeth, shaving, grooming)  dressing  looking after one's health    Domestic Life  doing house work (cleaning house, washing dishes, laundry)  assisting others    Interactions/Relationships  no deficits    Life Areas  no deficits    Community and Social Life  community life  recreation and leisure         moderate   Clinical Presentation stable and uncomplicated low   Decision Making/ Complexity Score: low     Goals:  Short Term Goals: 2 weeks   - Pt will demonstrate excellent knowledge and adherence to HEP to facilitate optimal recovery.    Long Term Goals: 12 weeks   - Pt will demonstrate (L) shoulder AROM equal to contralateral side to allow for increased functional of use of LUE.   - Pt will  demonstrate 4/5 MMT or greater of (L) shoulder musculature to allow for increased functional use of LUE.   - Pt will report at least 75% decreased pain with ADLs/IADLs indicating increased functional tolerance with LUE.   - Pt will demonstrate FOTO score of 30% limited or less indicating clinically relevant increase in function.      Plan   Plan of care Certification: 1/17/2019 to 4/12/2019.    Outpatient Physical Therapy 2 times weekly for 12 weeks to include the following interventions: Manual Therapy, Moist Heat/ Ice, Neuromuscular Re-ed, Patient Education, Therapeutic Activites and Therapeutic Exercise.     Stephie Palafox, PT, DPT

## 2019-01-18 PROBLEM — R53.1 WEAKNESS: Status: ACTIVE | Noted: 2019-01-18

## 2019-01-18 PROBLEM — M25.60 RANGE OF MOTION DEFICIT: Status: ACTIVE | Noted: 2019-01-18

## 2019-01-23 ENCOUNTER — CLINICAL SUPPORT (OUTPATIENT)
Dept: REHABILITATION | Facility: HOSPITAL | Age: 44
End: 2019-01-23
Attending: ORTHOPAEDIC SURGERY
Payer: MEDICARE

## 2019-01-23 DIAGNOSIS — R53.1 WEAKNESS: ICD-10-CM

## 2019-01-23 DIAGNOSIS — M25.60 RANGE OF MOTION DEFICIT: ICD-10-CM

## 2019-01-23 PROCEDURE — 97110 THERAPEUTIC EXERCISES: CPT

## 2019-01-23 PROCEDURE — 97140 MANUAL THERAPY 1/> REGIONS: CPT

## 2019-01-23 NOTE — PROGRESS NOTES
Physical Therapy Daily Treatment Note     Name: Moisés Hui [facility] Number: [id number]    Therapy Diagnosis:   Encounter Diagnoses   Name Primary?    Range of motion deficit     Weakness      Physician: Rakesh العلي MD    Visit Date: 1/23/2019    Physician Orders: PT Eval and Treat   subpec tenodesis protocol     Medical Diagnosis from Referral: M25.512,G89.29 (ICD-10-CM) - Chronic left shoulder pain  Surgery Date: 1/14/2019  Evaluation Date: 1/17/2019  Authorization Period Expiration: 12/31/2019  Plan of Care Expiration: 4/12/2019  Visit # / Visits authorized: 2/ 20     Time In: 9:00  Time Out: 9:53  Total Billable Time: 53 minutes     Precautions: HIV positive    Subjective     Pt reports: He has felt well since his initial eval. He has been doing HEP. He feel ready to take the sling off at night when he's sleeping.  He was compliant with home exercise program.  Response to previous treatment: Tolerated well  Functional change: NA    Pain: 0/10  Location: left shoulder      Objective     Moisés received therapeutic exercises to develop ROM for 13 minutes including:  - AROM elbow flexion/extension, pronation/supination  - forward and lateral trunk flexion for passive shoulder movement 15x ea    Moisés received the following manual therapy techniques: PROM were applied to the: L shoulder for 40 minutes, including:  - PROM: flexion, abduction, scaption, ER/IR per protocol and patient tolerance.       Moisés received hot pack for 10 minutes to L shoulder prior to session to promote circulation.    Moisés denied ice pack, stating he would ice at home after PT.       Home Exercises Provided and Patient Education Provided     Education provided:   - review of POC  - HEP update    Written Home Exercises Provided: no; added AROM elbow flex/ext, pro/sup and front/lateral trunk flexion for passive shoulder ROM.  Exercises were reviewed and Moisés was able to demonstrate them prior to the end of  the session.  Moiéss demonstrated good  understanding of the education provided.       Assessment     Continued treatment per protocol with focus on increasing ROM in patient's tolerance. Pt demonstrated increased discomfort at end-range flexion and abduction (approx 110 deg with both motions), but tolerated approx 130 deg of scaption. Updated HEP to include AROM elbow and passive shoulder motion by means of trunk flexion with excellent tolerance noted and good understanding of instructions. Pt noted feeling sore at end of session but stated he would ice when he got home.     Moisés is progressing well towards his goals.   Pt prognosis is Good.     Pt will continue to benefit from skilled outpatient physical therapy to address the deficits listed in the problem list box on initial evaluation, provide pt/family education and to maximize pt's level of independence in the home and community environment.     Pt's spiritual, cultural and educational needs considered and pt agreeable to plan of care and goals.    Anticipated barriers to physical therapy: None.     Goals:  Short Term Goals: 2 weeks   - Pt will demonstrate excellent knowledge and adherence to HEP to facilitate optimal recovery.     Long Term Goals: 12 weeks   - Pt will demonstrate (L) shoulder AROM equal to contralateral side to allow for increased functional of use of LUE.   - Pt will demonstrate 4/5 MMT or greater of (L) shoulder musculature to allow for increased functional use of LUE.   - Pt will report at least 75% decreased pain with ADLs/IADLs indicating increased functional tolerance with LUE.   - Pt will demonstrate FOTO score of 30% limited or less indicating clinically relevant increase in function.      Plan     Continue to progress per protocol.     Stephie Palafox, PT, DPT

## 2019-01-28 ENCOUNTER — TELEPHONE (OUTPATIENT)
Dept: SURGERY | Facility: CLINIC | Age: 44
End: 2019-01-28

## 2019-01-28 DIAGNOSIS — R19.09 INGUINAL BULGE: Primary | ICD-10-CM

## 2019-01-28 DIAGNOSIS — R10.32 LEFT GROIN PAIN: ICD-10-CM

## 2019-01-28 NOTE — TELEPHONE ENCOUNTER
Spoke with patient regarding his upcoming appt with Dr. Lauren on 2/5/19 for a femoral hernia.  Per Dr. Lauren, CT of [facility] scheduled for Friday 2/1/19 at 3pm.  He is to arrive at 1pm and there is nothing to eat or drink for 4 hours prior to the Scan.  He is to report the [facility] across the street from [address] [place].  He verbalized understanding.

## 2019-01-29 ENCOUNTER — OFFICE VISIT (OUTPATIENT)
Dept: SPORTS MEDICINE | Facility: CLINIC | Age: 44
End: 2019-01-29
Payer: MEDICARE

## 2019-01-29 VITALS
HEIGHT: 76 IN | SYSTOLIC BLOOD PRESSURE: 156 MMHG | DIASTOLIC BLOOD PRESSURE: 89 MMHG | WEIGHT: 209 LBS | HEART RATE: 105 BPM | BODY MASS INDEX: 25.45 KG/M2

## 2019-01-29 DIAGNOSIS — Z98.890 S/P ARTHROSCOPY OF LEFT SHOULDER: Primary | ICD-10-CM

## 2019-01-29 PROCEDURE — 99999 PR PBB SHADOW E&M-EST. PATIENT-LVL III: CPT | Mod: PBBFAC,,, | Performed by: PHYSICIAN ASSISTANT

## 2019-01-29 PROCEDURE — 99213 OFFICE O/P EST LOW 20 MIN: CPT | Mod: PBBFAC,PO | Performed by: PHYSICIAN ASSISTANT

## 2019-01-29 PROCEDURE — 99999 PR PBB SHADOW E&M-EST. PATIENT-LVL III: ICD-10-PCS | Mod: PBBFAC,,, | Performed by: PHYSICIAN ASSISTANT

## 2019-01-29 PROCEDURE — 99024 PR POST-OP FOLLOW-UP VISIT: ICD-10-PCS | Mod: POP,,, | Performed by: PHYSICIAN ASSISTANT

## 2019-01-29 PROCEDURE — 99024 POSTOP FOLLOW-UP VISIT: CPT | Mod: POP,,, | Performed by: PHYSICIAN ASSISTANT

## 2019-01-29 NOTE — PROGRESS NOTES
CC: left shoulder post op 2 weeks    Pain well tolerated, rarely taking pain medication  Sling in place  No issues reported, pain primarily located over bicep    DATE OF SURGERY:  1/14/19      PREOPERATIVE DIAGNOSES:   1. Left shoulder biceps tendinopathy   2. Left shoulder SLAP tear  3. Left shoulder glenohumeral degenerative arthritis     POSTOPERATIVE DIAGNOSES:   1. Left shoulder biceps tendinopathy   2. Left shoulder SLAP tear  3. Left shoulder glenohumeral degenerative arthritis     PROCEDURE:   1. Left shoulder open subpectoral biceps tenodesis   2. Left shoulder arthroscopic debridement labrum  3. Left shoulder arthroscopic chondroplasty glenohumeral joint  4. Left shoulder arthroscopic subacromial decompression.      SURGEON: Rakesh العلي M.D.      ASSISTANTS:   1. Burt Matos M.D.   2. Jayda De La Cruz    Review of Systems   Constitution: Negative. Negative for chills, fever and night sweats.    Cardiovascular: Negative for chest pain and syncope.   Respiratory: Negative for cough and shortness of breath.    Gastrointestinal: Negative for abdominal pain and bowel incontinence.      PE:    There were no vitals taken for this visit.     left shoulder    Incision healed  No sign of infection  Swelling resolved  Compartments soft  Neurovascular status intact in extremity    PROM  Forward elevation 90 degrees  External rotation 0 degrees    Assessment:  2 weeks s/p:  1. Left shoulder open subpectoral biceps tenodesis   2. Left shoulder arthroscopic debridement labrum  3. Left shoulder arthroscopic chondroplasty glenohumeral joint  4. Left shoulder arthroscopic subacromial decompression.     Plan:  1. Continue PT   2. Pain medication as needed for PT; try to wean off for next visit  3. Return to clinic in 4 weeks for 6 week post op follow up  4. D/c sling as tolerated, no resisted elbow flexion x 6 weeks

## 2019-01-30 ENCOUNTER — CLINICAL SUPPORT (OUTPATIENT)
Dept: REHABILITATION | Facility: HOSPITAL | Age: 44
End: 2019-01-30
Attending: ORTHOPAEDIC SURGERY
Payer: MEDICARE

## 2019-01-30 DIAGNOSIS — R53.1 WEAKNESS: ICD-10-CM

## 2019-01-30 DIAGNOSIS — M25.60 RANGE OF MOTION DEFICIT: ICD-10-CM

## 2019-01-30 PROCEDURE — 97110 THERAPEUTIC EXERCISES: CPT

## 2019-01-30 PROCEDURE — 97140 MANUAL THERAPY 1/> REGIONS: CPT

## 2019-01-30 NOTE — PROGRESS NOTES
Physical Therapy Daily Treatment Note     Name: Moisés Hui [facility] Number: [id number]    Therapy Diagnosis:   Encounter Diagnoses   Name Primary?    Range of motion deficit     Weakness      Physician: Rakesh العلي MD    Visit Date: 1/30/2019    Physician Orders: PT Eval and Treat   subpec tenodesis protocol     Medical Diagnosis from Referral: M25.512,G89.29 (ICD-10-CM) - Chronic left shoulder pain  Surgery Date: 1/14/2019  Evaluation Date: 1/17/2019  Authorization Period Expiration: 12/31/2019  Plan of Care Expiration: 4/12/2019  Visit # / Visits authorized: 3/ 20     Time In: 9:00  Time Out: 10:10  Total Billable Time: 30 minutes     Precautions: HIV positive    Subjective     Pt reports: He felt like he pushed himself too much last session, he's been sore although he notes soreness has been getting less. He presents without pillow on sling and reports PA told him he can take his arm out of the sling more.   He was compliant with home exercise program.  Response to previous treatment: Tolerated well  Functional change: NA    Pain: 0/10  Location: left shoulder      Objective     Moisés received therapeutic exercises to develop ROM for 10 minutes including:  - AROM elbow flexion/extension, pronation/supination 30x ea  - forward and lateral trunk flexion for passive shoulder movement 15x ea  - table slides for flexion 20x    Moisés received the following manual therapy techniques: PROM were applied to the: L shoulder for 20 minutes, including:  - PROM: flexion, abduction, scaption, ER/IR per protocol and patient tolerance - increased emphasis on scaption      Moisés received hot pack for 10 minutes to L shoulder prior to session to promote circulation.    Moisés received ice pack for 10 minutes to L shoulder post-session       Home Exercises Provided and Patient Education Provided     Education provided:   - review of POC  - HEP update    Written Home Exercises Provided: no; added AROM  elbow flex/ext, pro/sup and front/lateral trunk flexion for passive shoulder ROM.  Exercises were reviewed and Moisés was able to demonstrate them prior to the end of the session.  Moisés demonstrated good  understanding of the education provided.       Assessment     Continued treatment per protocol with focus on increasing ROM in patient's tolerance. Decreased PROM into flexion performed due to increased soreness in sagittal plane; various planes of abduciton-scaption performed with good tolerance noted up to 90 deg. Initiated table slides for flexion with excellent tolerance noted. Ice utilized to decrease soreness/pain after session. Patient reported feeling well upon departure.     Moisés is progressing well towards his goals.   Pt prognosis is Good.     Pt will continue to benefit from skilled outpatient physical therapy to address the deficits listed in the problem list box on initial evaluation, provide pt/family education and to maximize pt's level of independence in the home and community environment.     Pt's spiritual, cultural and educational needs considered and pt agreeable to plan of care and goals.    Anticipated barriers to physical therapy: None.     Goals:  Short Term Goals: 2 weeks   - Pt will demonstrate excellent knowledge and adherence to HEP to facilitate optimal recovery. (achieved)      Long Term Goals: 12 weeks   - Pt will demonstrate (L) shoulder AROM equal to contralateral side to allow for increased functional of use of LUE.   - Pt will demonstrate 4/5 MMT or greater of (L) shoulder musculature to allow for increased functional use of LUE.   - Pt will report at least 75% decreased pain with ADLs/IADLs indicating increased functional tolerance with LUE.   - Pt will demonstrate FOTO score of 30% limited or less indicating clinically relevant increase in function.      Plan     Continue to progress per protocol.     Stephie Palafox, PT, DPT

## 2019-02-01 ENCOUNTER — HOSPITAL ENCOUNTER (OUTPATIENT)
Dept: RADIOLOGY | Facility: HOSPITAL | Age: 44
Discharge: HOME | End: 2019-02-01
Attending: SURGERY
Payer: MEDICARE

## 2019-02-01 DIAGNOSIS — R10.32 LEFT GROIN PAIN: ICD-10-CM

## 2019-02-01 DIAGNOSIS — R19.09 INGUINAL BULGE: ICD-10-CM

## 2019-02-01 LAB
CREAT SERPL-MCNC: 1.2 MG/DL (ref 0.5–1.4)
SAMPLE: NORMAL

## 2019-02-01 PROCEDURE — 25500020 PHARM REV CODE 255: Performed by: SURGERY

## 2019-02-01 PROCEDURE — 74177 CT ABD & PELVIS W/CONTRAST: CPT | Mod: TC

## 2019-02-01 PROCEDURE — 74177 CT ABDOMEN PELVIS WITH CONTRAST: ICD-10-PCS | Mod: 26,,, | Performed by: RADIOLOGY

## 2019-02-01 PROCEDURE — 74177 CT ABD & PELVIS W/CONTRAST: CPT | Mod: 26,,, | Performed by: RADIOLOGY

## 2019-02-01 RX ADMIN — IOHEXOL 15 ML: 350 INJECTION, SOLUTION INTRAVENOUS at 02:02

## 2019-02-01 RX ADMIN — IOHEXOL 100 ML: 350 INJECTION, SOLUTION INTRAVENOUS at 04:02

## 2019-02-05 ENCOUNTER — OFFICE VISIT (OUTPATIENT)
Dept: SURGERY | Facility: CLINIC | Age: 44
End: 2019-02-05
Payer: MEDICARE

## 2019-02-05 VITALS
BODY MASS INDEX: 25.59 KG/M2 | SYSTOLIC BLOOD PRESSURE: 153 MMHG | HEIGHT: 76 IN | TEMPERATURE: 98 F | WEIGHT: 210.13 LBS | DIASTOLIC BLOOD PRESSURE: 81 MMHG | HEART RATE: 101 BPM

## 2019-02-05 DIAGNOSIS — R59.0 INGUINAL LYMPHADENOPATHY: Primary | ICD-10-CM

## 2019-02-05 PROCEDURE — 99203 OFFICE O/P NEW LOW 30 MIN: CPT | Mod: S$PBB,,, | Performed by: SURGERY

## 2019-02-05 PROCEDURE — 99999 PR PBB SHADOW E&M-EST. PATIENT-LVL III: CPT | Mod: PBBFAC,,, | Performed by: SURGERY

## 2019-02-05 PROCEDURE — 99999 PR PBB SHADOW E&M-EST. PATIENT-LVL III: ICD-10-PCS | Mod: PBBFAC,,, | Performed by: SURGERY

## 2019-02-05 PROCEDURE — 99213 OFFICE O/P EST LOW 20 MIN: CPT | Mod: PBBFAC | Performed by: SURGERY

## 2019-02-05 PROCEDURE — 99203 PR OFFICE/OUTPT VISIT, NEW, LEVL III, 30-44 MIN: ICD-10-PCS | Mod: S$PBB,,, | Performed by: SURGERY

## 2019-02-05 RX ORDER — SULFAMETHOXAZOLE AND TRIMETHOPRIM 800; 160 MG/1; MG/1
1 TABLET ORAL 2 TIMES DAILY
Qty: 20 TABLET | Refills: 0 | Status: SHIPPED | OUTPATIENT
Start: 2019-02-05 | End: 2019-02-15

## 2019-02-05 NOTE — LETTER
February 5, 2019      Bo Shepard MD  [address] [facility] [address]           [facility] - General Surgery  [address]  [place] [address]  Phone: [phone]          Patient: Moisés Koehler   MR Number: [id number]   YOB: 1975   Date of Visit: 2/5/2019       Dear Dr. Bo Shepard:    Thank you for referring Moisés Koehler to me for evaluation. Attached you will find relevant portions of my assessment and plan of care.    If you have questions, please do not hesitate to call me. I look forward to following Moisés Koehler along with you.    Sincerely,    Patrick Lauren MD    Enclosure  CC:  No Recipients    If you would like to receive this communication electronically, please contact [email or web address] or [phone] to request more information on [employer] access.    For providers and/or their staff who would like to refer a patient to [facility], please contact us through our one-stop-shop provider referral line, [facility], at [phone].    If you feel you have received this communication in error or would no longer like to receive these types of communications, please e-mail [email or web address]

## 2019-02-06 ENCOUNTER — CLINICAL SUPPORT (OUTPATIENT)
Dept: REHABILITATION | Facility: HOSPITAL | Age: 44
End: 2019-02-06
Attending: ORTHOPAEDIC SURGERY
Payer: MEDICARE

## 2019-02-06 ENCOUNTER — PATIENT MESSAGE (OUTPATIENT)
Dept: SPORTS MEDICINE | Facility: CLINIC | Age: 44
End: 2019-02-06

## 2019-02-06 DIAGNOSIS — M75.22 BICEPS TENDINITIS OF LEFT UPPER EXTREMITY: ICD-10-CM

## 2019-02-06 DIAGNOSIS — S43.432A SUPERIOR GLENOID LABRUM LESION OF LEFT SHOULDER, INITIAL ENCOUNTER: ICD-10-CM

## 2019-02-06 DIAGNOSIS — R53.1 WEAKNESS: ICD-10-CM

## 2019-02-06 DIAGNOSIS — M24.012 LOOSE BODY OF LEFT SHOULDER: ICD-10-CM

## 2019-02-06 DIAGNOSIS — M25.60 RANGE OF MOTION DEFICIT: ICD-10-CM

## 2019-02-06 PROCEDURE — 97110 THERAPEUTIC EXERCISES: CPT

## 2019-02-06 PROCEDURE — 97140 MANUAL THERAPY 1/> REGIONS: CPT

## 2019-02-06 RX ORDER — OXYCODONE AND ACETAMINOPHEN 10; 325 MG/1; MG/1
1 TABLET ORAL EVERY 6 HOURS PRN
Qty: 28 TABLET | Refills: 0 | Status: SHIPPED | OUTPATIENT
Start: 2019-02-06 | End: 2019-03-26

## 2019-02-06 NOTE — PROGRESS NOTES
Physical Therapy Daily Treatment Note     Name: Moisés Hui [facility] Number: [id number]    Therapy Diagnosis:   Encounter Diagnoses   Name Primary?    Range of motion deficit     Weakness      Physician: Rakesh العلي MD    Visit Date: 2/6/2019    Physician Orders: PT Eval and Treat   subpec tenodesis protocol     Medical Diagnosis from Referral: M25.512,G89.29 (ICD-10-CM) - Chronic left shoulder pain  Surgery Date: 1/14/2019  Evaluation Date: 1/17/2019  Authorization Period Expiration: 12/31/2019  Plan of Care Expiration: 4/12/2019  Visit # / Visits authorized: 4/ 20     Time In: 11:00  Time Out: 12:00  Total Billable Time: 60 minutes     Precautions: HIV positive    Subjective     Pt reports: His biceps is killing him, he has significant difficulty lifting arm up in flexion.   He was compliant with home exercise program.  Response to previous treatment: Tolerated well  Functional change: NA    Pain: NT  Location: left shoulder      Objective     Moisés received therapeutic exercises to develop ROM for 10 minutes including:  - AROM elbow flexion/extension, pronation/supination 30x ea  - forward and lateral trunk flexion for passive shoulder movement 15x ea  - table slides for flexion and scaption 30x ea    Moisés received the following manual therapy techniques: PROM were applied to the: L shoulder for 38 minutes, including:  - PROM: scaption, ER/IR (mid-range); elbow flex/ext, pro/sup per protocol and patient tolerance   - STM/muscle play: biceps for pain inhibition.     Moisés received hot pack for 10 minutes to L shoulder prior to session to promote circulation.    Moisés received ice pack for 10 minutes to L shoulder post-session       Home Exercises Provided and Patient Education Provided     Education provided:   - review of POC  - HEP update    Written Home Exercises Provided: no; added AROM elbow flex/ext, pro/sup and front/lateral trunk flexion for passive shoulder ROM. Instructed  patient to focus on active scaption, avoid active flexion.   Exercises were reviewed and Moisés was able to demonstrate them prior to the end of the session.  Moisés demonstrated good  understanding of the education provided.       Assessment     Continued treatment per protocol with focus on scpation PROM to avoid further irritation of biceps musculature, no tissue resistance noted and no increased symptom reported. Increased repetitions of table slides for flexion and scaption without pain. Patient reported feeling well upon departure.       Moisés is progressing well towards his goals.   Pt prognosis is Good.     Pt will continue to benefit from skilled outpatient physical therapy to address the deficits listed in the problem list box on initial evaluation, provide pt/family education and to maximize pt's level of independence in the home and community environment.     Pt's spiritual, cultural and educational needs considered and pt agreeable to plan of care and goals.    Anticipated barriers to physical therapy: None.     Goals:  Short Term Goals: 2 weeks   - Pt will demonstrate excellent knowledge and adherence to HEP to facilitate optimal recovery. (achieved)      Long Term Goals: 12 weeks   - Pt will demonstrate (L) shoulder AROM equal to contralateral side to allow for increased functional of use of LUE.   - Pt will demonstrate 4/5 MMT or greater of (L) shoulder musculature to allow for increased functional use of LUE.   - Pt will report at least 75% decreased pain with ADLs/IADLs indicating increased functional tolerance with LUE.   - Pt will demonstrate FOTO score of 30% limited or less indicating clinically relevant increase in function.      Plan     Continue to progress per protocol.     Stephie Palafox, PT, DPT

## 2019-02-07 ENCOUNTER — TELEPHONE (OUTPATIENT)
Dept: PHARMACY | Facility: CLINIC | Age: 44
End: 2019-02-07

## 2019-02-07 NOTE — TELEPHONE ENCOUNTER
Refill readiness for Atripla confirmed with patient; name/ confirmed; no missed doses; no new medications; no side effects noted; 10 doses remaining; address confirmed for  shipment and  delivery.

## 2019-02-08 ENCOUNTER — CLINICAL SUPPORT (OUTPATIENT)
Dept: REHABILITATION | Facility: HOSPITAL | Age: 44
End: 2019-02-08
Attending: ORTHOPAEDIC SURGERY
Payer: MEDICARE

## 2019-02-08 DIAGNOSIS — R53.1 WEAKNESS: ICD-10-CM

## 2019-02-08 DIAGNOSIS — M25.60 RANGE OF MOTION DEFICIT: ICD-10-CM

## 2019-02-08 PROCEDURE — 97140 MANUAL THERAPY 1/> REGIONS: CPT

## 2019-02-08 PROCEDURE — 97110 THERAPEUTIC EXERCISES: CPT

## 2019-02-08 NOTE — PROGRESS NOTES
Physical Therapy Daily Treatment Note     Name: Moisés Koehler  Clinic Number: [id number]    Therapy Diagnosis:   No diagnosis found.  Physician: Rakesh العلي MD    Visit Date: 2/8/2019    Physician Orders: PT Eval and Treat   subpec tenodesis protocol     Medical Diagnosis from Referral: M25.512,G89.29 (ICD-10-CM) - Chronic left shoulder pain  Surgery Date: 1/14/2019  Evaluation Date: 1/17/2019  Authorization Period Expiration: 12/31/2019  Plan of Care Expiration: 4/12/2019  Visit # / Visits authorized: 5/ 20     Time In: 11:00  Time Out: 12:00  Total Billable Time: 60 minutes     Precautions: HIV positive    Subjective     Pt reports: he instinctively went to turn a lightswitch off the other day with his L arm and he was able to reach his arm up without issue. He has felt much better since previous session.   He was compliant with home exercise program.  Response to previous treatment: Tolerated well  Functional change: NA    Pain: 0/10  Location: left shoulder      Objective         CMS Impairment/Limitation/Restriction for FOTO Shoulder Survey    Therapist reviewed FOTO scores for Moisés Koehler on 2/8/2019.   FOTO documents entered into [employer] - see Media section.    Limitation Score: 40%  Category: Other    Current : CK = at least 40% but < 60% impaired, limited or restricted  Goal: CJ = at least 20% but < 40% impaired, limited or restricted         Moisés received therapeutic exercises to develop ROM for 10 minutes including:  - AROM elbow flexion/extension, pronation/supination 30x ea  - forward and lateral trunk flexion for passive shoulder movement 15x ea  - table slides for flexion 2 min timed   - pulleys for flexion/scaption 2 min timed ea    Moisés received the following manual therapy techniques: PROM were applied to the: L shoulder for 38 minutes, including:  - PROM: scaption, ER/IR (mid-range); elbow flex/ext, pro/sup per protocol and patient tolerance   - STM/muscle play: biceps  for pain inhibition.     Moisés received hot pack for 10 minutes to L shoulder prior to session to promote circulation.    Moisés received ice pack for 10 minutes to L shoulder post-session       Home Exercises Provided and Patient Education Provided     Education provided:   - review of POC  - HEP update    Written Home Exercises Provided: no; added AROM elbow flex/ext, pro/sup and front/lateral trunk flexion for passive shoulder ROM. Instructed patient to focus on active scaption, avoid active flexion.   Exercises were reviewed and Moisés was able to demonstrate them prior to the end of the session.  Moisés demonstrated good  understanding of the education provided.       Assessment     Continued treatment per protocol with increased tolerance to PROM flexion compared to previous visits. Significant decreased muscle guarding noted overall compared to previous sessions. Initiated pulleys for AAROM flexion and scaption with no symptom aggravation. Patient reported feeling well upon departure.        Moisés is progressing well towards his goals.   Pt prognosis is Good.     Pt will continue to benefit from skilled outpatient physical therapy to address the deficits listed in the problem list box on initial evaluation, provide pt/family education and to maximize pt's level of independence in the home and community environment.     Pt's spiritual, cultural and educational needs considered and pt agreeable to plan of care and goals.    Anticipated barriers to physical therapy: None.     Goals:  Short Term Goals: 2 weeks   - Pt will demonstrate excellent knowledge and adherence to HEP to facilitate optimal recovery. (achieved)      Long Term Goals: 12 weeks   - Pt will demonstrate (L) shoulder AROM equal to contralateral side to allow for increased functional of use of LUE.   - Pt will demonstrate 4/5 MMT or greater of (L) shoulder musculature to allow for increased functional use of LUE.   - Pt will report at  least 75% decreased pain with ADLs/IADLs indicating increased functional tolerance with LUE.   - Pt will demonstrate FOTO score of 30% limited or less indicating clinically relevant increase in function.      Plan     Continue to progress per protocol.     Stephie Palafox, PT, DPT

## 2019-02-12 ENCOUNTER — CLINICAL SUPPORT (OUTPATIENT)
Dept: REHABILITATION | Facility: HOSPITAL | Age: 44
End: 2019-02-12
Attending: ORTHOPAEDIC SURGERY
Payer: MEDICARE

## 2019-02-12 DIAGNOSIS — R53.1 WEAKNESS: ICD-10-CM

## 2019-02-12 DIAGNOSIS — M25.60 RANGE OF MOTION DEFICIT: ICD-10-CM

## 2019-02-12 PROCEDURE — 97140 MANUAL THERAPY 1/> REGIONS: CPT

## 2019-02-14 NOTE — PROGRESS NOTES
Physical Therapy Daily Treatment Note     Name: Moisés Hui [facility] Number: [id number]    Therapy Diagnosis:   Encounter Diagnoses   Name Primary?    Range of motion deficit     Weakness      Physician: Rakesh العلي MD    Visit Date: 2/12/2019    Physician Orders: PT Eval and Treat   subpec tenodesis protocol     Medical Diagnosis from Referral: M25.512,G89.29 (ICD-10-CM) - Chronic left shoulder pain  Surgery Date: 1/14/2019  Evaluation Date: 1/17/2019  Authorization Period Expiration: 12/31/2019  Plan of Care Expiration: 4/12/2019  Visit # / Visits authorized: 6/ 20     Time In: 11:00  Time Out: 12:00  Total Billable Time: 15 minutes     Precautions: HIV positive    Subjective     Pt reports: he is still having an easier time with lifting his arm.   He was compliant with home exercise program.  Response to previous treatment: Tolerated well  Functional change: NA    Pain: 0/10  Location: left shoulder      Objective         Moisés received therapeutic exercises to develop ROM for 5 minutes including:  - AROM elbow flexion/extension, pronation/supination 30x ea  - forward and lateral trunk flexion for passive shoulder movement 15x ea  - table slides for flexion 2 min timed   - pulleys for flexion/scaption 2 min timed ea    Moisés received the following manual therapy techniques: PROM were applied to the: L shoulder for 15 minutes, including:  - PROM: scaption, ER/IR (mid-range); elbow flex/ext, pro/sup per protocol and patient tolerance   - STM/muscle play: biceps for pain inhibition.     Moisés received hot pack for 10 minutes to L shoulder prior to session to promote circulation.    Moisés received ice pack for 10 minutes to L shoulder post-session       Home Exercises Provided and Patient Education Provided     Education provided:   - review of POC  - HEP update    Written Home Exercises Provided: no; added AROM elbow flex/ext, pro/sup and front/lateral trunk flexion for passive shoulder  ROM. Instructed patient to focus on active scaption, avoid active flexion.   Exercises were reviewed and Moisés was able to demonstrate them prior to the end of the session.  Moisés demonstrated good  understanding of the education provided.       Assessment     Continued treatment per protocol with increased tolerance to PROM flexion compared to previous visits. Significant decreased muscle guarding noted overall compared to previous sessions. Improved tolerance to AROM flexion to 90 noted at start of session, progressively more difficult at end of session due to fatigue. Attempted wall slides but pt was unable to perform, continued with table slides. Patient reported feeling well upon departure.        Moisés is progressing well towards his goals.   Pt prognosis is Good.     Pt will continue to benefit from skilled outpatient physical therapy to address the deficits listed in the problem list box on initial evaluation, provide pt/family education and to maximize pt's level of independence in the home and community environment.     Pt's spiritual, cultural and educational needs considered and pt agreeable to plan of care and goals.    Anticipated barriers to physical therapy: None.     Goals:  Short Term Goals: 2 weeks   - Pt will demonstrate excellent knowledge and adherence to HEP to facilitate optimal recovery. (achieved)      Long Term Goals: 12 weeks   - Pt will demonstrate (L) shoulder AROM equal to contralateral side to allow for increased functional of use of LUE.   - Pt will demonstrate 4/5 MMT or greater of (L) shoulder musculature to allow for increased functional use of LUE.   - Pt will report at least 75% decreased pain with ADLs/IADLs indicating increased functional tolerance with LUE.   - Pt will demonstrate FOTO score of 30% limited or less indicating clinically relevant increase in function.      Plan     Continue to progress per protocol.     Stephie Palafox, PT, DPT

## 2019-02-22 ENCOUNTER — CLINICAL SUPPORT (OUTPATIENT)
Dept: REHABILITATION | Facility: HOSPITAL | Age: 44
End: 2019-02-22
Attending: ORTHOPAEDIC SURGERY
Payer: MEDICARE

## 2019-02-22 DIAGNOSIS — M25.60 RANGE OF MOTION DEFICIT: ICD-10-CM

## 2019-02-22 DIAGNOSIS — R53.1 WEAKNESS: ICD-10-CM

## 2019-02-22 PROCEDURE — 97140 MANUAL THERAPY 1/> REGIONS: CPT

## 2019-02-22 PROCEDURE — 97110 THERAPEUTIC EXERCISES: CPT

## 2019-02-22 NOTE — PROGRESS NOTES
Physical Therapy Daily Treatment Note     Name: Moisés Hui [facility] Number: [id number]    Therapy Diagnosis:   Encounter Diagnoses   Name Primary?    Range of motion deficit     Weakness      Physician: Rakesh العلي MD    Visit Date: 2/22/2019    Physician Orders: PT Eval and Treat   subpec tenodesis protocol     Medical Diagnosis from Referral: M25.512,G89.29 (ICD-10-CM) - Chronic left shoulder pain  Surgery Date: 1/14/2019  Evaluation Date: 1/17/2019  Authorization Period Expiration: 12/31/2019  Plan of Care Expiration: 4/12/2019  Visit # / Visits authorized: 7/ 20     Time In: 3:00  Time Out: 3:45  Total Billable Time: 23 minutes     Precautions: HIV positive    Subjective     Pt reports: his ROM has gotten so much better, he feels he can lead a normal life as far as ADLs go. His bicep still really aches.   He was compliant with home exercise program.  Response to previous treatment: Tolerated well  Functional change: increased ADL tolerance.     Pain: 0/10  Location: left shoulder      Objective     UPDATE:   AROM:   Flexion: 145 (supine 150)  Abduction: 130  ER: 70  IR: 70  Functional ER: T1 (equal bilateral)   Functional IR: T12 (contralateral T7)      TREATMENT:   Moisés received therapeutic exercises to develop ROM for 8 minutes including:  - AROM elbow flexion/extension, pronation/supination 30x ea  - forward and lateral trunk flexion for passive shoulder movement 15x ea  - table slides for flexion 2 min timed   - pulleys for flexion/scaption 2 min timed ea  - wall slides flexion, scaption 3 min timed each   - standing wand flexion, scaption 20x each       Moisés received the following manual therapy techniques: PROM were applied to the: L shoulder for 15 minutes, including:  - ROM assessment   - STM/muscle play: biceps for pain inhibition.     Moisés received hot pack for 10 minutes to L shoulder prior to session to promote circulation.    Moisés received ice pack for 0 minutes to  L shoulder post-session       Home Exercises Provided and Patient Education Provided     Education provided:   - review of POC  - HEP update    Written Home Exercises Provided: no; added AROM elbow flex/ext, pro/sup and front/lateral trunk flexion for passive shoulder ROM. Instructed patient to focus on active scaption, avoid active flexion.   Exercises were reviewed and Moisés was able to demonstrate them prior to the end of the session.  Moisés demonstrated good  understanding of the education provided.       Assessment     Reassessment performed with significant improvement in ROM in all planes noted. Focus of manual intervention on STM for mid-bicep to decrease muscle tension and promote pain inhibition. Progressed to standing wand AAROM and wall slides with excellent tolerance noted. Patient reported feeling well upon departure.         Moisés is progressing well towards his goals.   Pt prognosis is Good.     Pt will continue to benefit from skilled outpatient physical therapy to address the deficits listed in the problem list box on initial evaluation, provide pt/family education and to maximize pt's level of independence in the home and community environment.     Pt's spiritual, cultural and educational needs considered and pt agreeable to plan of care and goals.    Anticipated barriers to physical therapy: None.     Goals:  Short Term Goals: 2 weeks   - Pt will demonstrate excellent knowledge and adherence to HEP to facilitate optimal recovery. (achieved)      Long Term Goals: 12 weeks   - Pt will demonstrate (L) shoulder AROM equal to contralateral side to allow for increased functional of use of LUE.   - Pt will demonstrate 4/5 MMT or greater of (L) shoulder musculature to allow for increased functional use of LUE.   - Pt will report at least 75% decreased pain with ADLs/IADLs indicating increased functional tolerance with LUE.   - Pt will demonstrate FOTO score of 30% limited or less indicating  clinically relevant increase in function.      Plan     Continue to progress per protocol. Initiate resistance training in 2 weeks.     Stephie Palafox, PT, DPT

## 2019-02-26 ENCOUNTER — OFFICE VISIT (OUTPATIENT)
Dept: SPORTS MEDICINE | Facility: CLINIC | Age: 44
End: 2019-02-26
Payer: MEDICARE

## 2019-02-26 VITALS
SYSTOLIC BLOOD PRESSURE: 146 MMHG | BODY MASS INDEX: 25.57 KG/M2 | WEIGHT: 210 LBS | HEIGHT: 76 IN | HEART RATE: 84 BPM | DIASTOLIC BLOOD PRESSURE: 87 MMHG

## 2019-02-26 DIAGNOSIS — Z98.890 POST-OPERATIVE STATE: Primary | ICD-10-CM

## 2019-02-26 DIAGNOSIS — M25.512 LEFT SHOULDER PAIN: ICD-10-CM

## 2019-02-26 PROCEDURE — 99024 POSTOP FOLLOW-UP VISIT: CPT | Mod: POP,,, | Performed by: ORTHOPAEDIC SURGERY

## 2019-02-26 PROCEDURE — 99024 PR POST-OP FOLLOW-UP VISIT: ICD-10-PCS | Mod: POP,,, | Performed by: ORTHOPAEDIC SURGERY

## 2019-02-26 PROCEDURE — 99999 PR PBB SHADOW E&M-EST. PATIENT-LVL III: ICD-10-PCS | Mod: PBBFAC,,, | Performed by: ORTHOPAEDIC SURGERY

## 2019-02-26 PROCEDURE — 99213 OFFICE O/P EST LOW 20 MIN: CPT | Mod: PBBFAC,PO | Performed by: ORTHOPAEDIC SURGERY

## 2019-02-26 PROCEDURE — 99999 PR PBB SHADOW E&M-EST. PATIENT-LVL III: CPT | Mod: PBBFAC,,, | Performed by: ORTHOPAEDIC SURGERY

## 2019-02-26 NOTE — PROGRESS NOTES
"CC: Left shoulder post op 6 weeks    DATE OF SURGERY:  1/14/19      PREOPERATIVE DIAGNOSES:   1. Left shoulder biceps tendinopathy   2. Left shoulder SLAP tear  3. Left shoulder glenohumeral degenerative arthritis     POSTOPERATIVE DIAGNOSES:   1. Left shoulder biceps tendinopathy   2. Left shoulder SLAP tear  3. Left shoulder glenohumeral degenerative arthritis     PROCEDURE:   1. Left shoulder open subpectoral biceps tenodesis   2. Left shoulder arthroscopic debridement labrum  3. Left shoulder arthroscopic chondroplasty glenohumeral joint  4. Left shoulder arthroscopic subacromial decompression.      SURGEON: Rakesh العلي M.D.     Pain well tolerated on pain medication  No issues reported  Physical therapy at [facility] 2 x week    Review of Systems   Constitution: Negative. Negative for chills, fever and night sweats.    Cardiovascular: Negative for chest pain and syncope.   Respiratory: Negative for cough and shortness of breath.   Gastrointestinal: Negative for abdominal pain and bowel incontinence.     PE:  Vitals:    02/26/19 0932   BP: (!) 146/87   Pulse: 84   Weight: 95.3 kg (210 lb)   Height: 6' 4" (1.93 m)   PainSc:   6     Left shoulder  Incision healed  No sign of infection  Swelling resolved  Compartments soft  Neurovascular status intact in extremity    AROM  Forward elevation 150 degrees  External rotation 70 degrees    Assessment:  Appropriate shoulder surgery recovery at 6 weeks    Plan:  1. Continue PT  2. Follow up 4 weeks      "

## 2019-02-27 ENCOUNTER — CLINICAL SUPPORT (OUTPATIENT)
Dept: REHABILITATION | Facility: HOSPITAL | Age: 44
End: 2019-02-27
Attending: ORTHOPAEDIC SURGERY
Payer: MEDICARE

## 2019-02-27 DIAGNOSIS — M25.60 RANGE OF MOTION DEFICIT: ICD-10-CM

## 2019-02-27 DIAGNOSIS — R53.1 WEAKNESS: ICD-10-CM

## 2019-02-27 PROCEDURE — 97110 THERAPEUTIC EXERCISES: CPT

## 2019-02-27 PROCEDURE — 97140 MANUAL THERAPY 1/> REGIONS: CPT

## 2019-02-28 NOTE — PROGRESS NOTES
Physical Therapy Daily Treatment Note     Name: Moisés Hui [facility] Number: [id number]    Therapy Diagnosis:   Encounter Diagnoses   Name Primary?    Range of motion deficit     Weakness      Physician: Rakesh العلي MD    Visit Date: 2/27/2019    Physician Orders: PT Eval and Treat   subpec tenodesis protocol     Medical Diagnosis from Referral: M25.512,G89.29 (ICD-10-CM) - Chronic left shoulder pain  Surgery Date: 1/14/2019  Evaluation Date: 1/17/2019  Authorization Period Expiration: 12/31/2019  Plan of Care Expiration: 4/12/2019  Visit # / Visits authorized: 8/ 20     Time In: 2:00  Time Out: 2:50  Total Billable Time: 23 minutes     Precautions: HIV positive    Subjective     Pt reports: his ROM has gotten so much better, he feels he can lead a normal life as far as ADLs go. His bicep still really aches. He saw his surgeon who said he is happy with his progress so far.   He was compliant with home exercise program.  Response to previous treatment: Tolerated well  Functional change: increased ADL tolerance.     Pain: 0/10  Location: left shoulder      Objective     UPDATE:   AROM:   Flexion: 145 (supine 150)  Abduction: 130  ER: 70  IR: 70  Functional ER: T1 (equal bilateral)   Functional IR: T12 (contralateral T7)      TREATMENT:   Moisés received therapeutic exercises to develop ROM for 8 minutes including:  - AROM elbow flexion/extension, pronation/supination 30x ea  - forward and lateral trunk flexion for passive shoulder movement 15x ea  - table slides for flexion 2 min timed   - pulleys for flexion/scaption 3 min timed ea  - wall slides flexion, scaption 2x20 each   - standing wand flexion, scaption 20x each       Moisés received the following manual therapy techniques: PROM were applied to the: L shoulder for 15 minutes, including:  - STM/muscle play: biceps for pain inhibition.     Moisés received hot pack for 10 minutes to L shoulder prior to session to promote  circulation.    Moisés received ice pack for 10 minutes to L shoulder post-session       Home Exercises Provided and Patient Education Provided     Education provided:   - review of POC  - HEP update    Written Home Exercises Provided: no; added AROM elbow flex/ext, pro/sup and front/lateral trunk flexion for passive shoulder ROM. Instructed patient to focus on active scaption, avoid active flexion.   Exercises were reviewed and Moisés was able to demonstrate them prior to the end of the session.  Moisés demonstrated good  understanding of the education provided.       Assessment     Continued exericse progressions from previous session. Slight UT compensation with overhead AAROM ex's noted, pt still demonstrates significant ROM improvements. Patient reported feeling well upon departure.       Moisés is progressing well towards his goals.   Pt prognosis is Good.     Pt will continue to benefit from skilled outpatient physical therapy to address the deficits listed in the problem list box on initial evaluation, provide pt/family education and to maximize pt's level of independence in the home and community environment.     Pt's spiritual, cultural and educational needs considered and pt agreeable to plan of care and goals.    Anticipated barriers to physical therapy: None.     Goals:  Short Term Goals: 2 weeks   - Pt will demonstrate excellent knowledge and adherence to HEP to facilitate optimal recovery. (achieved)      Long Term Goals: 12 weeks   - Pt will demonstrate (L) shoulder AROM equal to contralateral side to allow for increased functional of use of LUE.   - Pt will demonstrate 4/5 MMT or greater of (L) shoulder musculature to allow for increased functional use of LUE.   - Pt will report at least 75% decreased pain with ADLs/IADLs indicating increased functional tolerance with LUE.   - Pt will demonstrate FOTO score of 30% limited or less indicating clinically relevant increase in function.      Plan      Continue to progress per protocol. Initiate resistance training at 8 week osmar.     Stephie Palafox, PT, DPT

## 2019-03-06 ENCOUNTER — PATIENT MESSAGE (OUTPATIENT)
Dept: ADMINISTRATIVE | Facility: OTHER | Age: 44
End: 2019-03-06

## 2019-03-08 NOTE — TELEPHONE ENCOUNTER
Patient called for refill readiness and follow up on Atripla.  No answer - lvm for call back.     Burt Stone, MATT.Ph., AAHIVP  Clinical Pharmacist, HIV/HCV  [facility]  Phone: [phone]

## 2019-03-11 ENCOUNTER — PATIENT MESSAGE (OUTPATIENT)
Dept: SURGERY | Facility: CLINIC | Age: 44
End: 2019-03-11

## 2019-03-12 NOTE — TELEPHONE ENCOUNTER
Patient called for refill readiness and follow up on Atripla.  No answer - lvm for call back.     Burt Stone, MATT.Ph., AAHIVP  Clinical Pharmacist, HIV/HCV  [facility]  Phone: [phone]

## 2019-03-15 ENCOUNTER — CLINICAL SUPPORT (OUTPATIENT)
Dept: REHABILITATION | Facility: HOSPITAL | Age: 44
End: 2019-03-15
Attending: ORTHOPAEDIC SURGERY
Payer: MEDICARE

## 2019-03-15 DIAGNOSIS — M25.60 RANGE OF MOTION DEFICIT: ICD-10-CM

## 2019-03-15 DIAGNOSIS — R53.1 WEAKNESS: ICD-10-CM

## 2019-03-15 PROCEDURE — 97140 MANUAL THERAPY 1/> REGIONS: CPT

## 2019-03-15 PROCEDURE — 97110 THERAPEUTIC EXERCISES: CPT

## 2019-03-15 NOTE — PROGRESS NOTES
"  Physical Therapy Daily Treatment Note     Name: Moisés Hui [facility] Number: [id number]    Therapy Diagnosis:   Encounter Diagnoses   Name Primary?    Range of motion deficit     Weakness      Physician: Rakesh العلي MD    Visit Date: 3/15/2019    Physician Orders: PT Eval and Treat   subpec tenodesis protocol     Medical Diagnosis from Referral: M25.512,G89.29 (ICD-10-CM) - Chronic left shoulder pain  Surgery Date: 1/14/2019  Evaluation Date: 1/17/2019  Authorization Period Expiration: 12/31/2019  Plan of Care Expiration: 4/12/2019  Visit # / Visits authorized: 8/ 20     Time In: 2:00  Time Out: 2:50  Total Billable Time: 23 minutes     Precautions: HIV positive    Subjective     Pt reports: his bicep feels "fantastic" but he now has pain in the anterior aspect of his shoulder and in his middle deltoid that feels "worse than before the surgery." He's been lifting very light weights this week to "get the form down."  He was compliant with home exercise program.  Response to previous treatment: Tolerated well  Functional change: increased ADL tolerance.     Pain: Patient unable to verbalize number  Location: left shoulder      Objective     UPDATE:   Flexion: 4-  Abduction: 4-  ER: 4  IR: 4+     TREATMENT:   Moisés received therapeutic exercises to develop ROM for 8 minutes including:  - prone rows 3# 4x10  - SL ER 3# 4x10   - no moneys OTB 4x10 (added to HEP)       Moisés received the following manual therapy techniques: STM applied to the: L shoulder for 15 minutes, including:  - STM/muscle play: deltoid for pain inhibition and STM.     Moisés received hot pack for 0 minutes to L shoulder prior to session to promote circulation.    Moisés received ice pack for 10 minutes to L shoulder post-session       Home Exercises Provided and Patient Education Provided     Education provided:   - review of POC  - HEP update    Written Home Exercises Provided: no; no moneys OTB and prone rows 3#. "   Exercises were reviewed and Moisés was able to demonstrate them prior to the end of the session.  Moisés demonstrated good  understanding of the education provided.       Assessment     Initiated beginning resistance exercises aimed at scapular stabilization with good tolerance noted. STM performed to deltoid revealed moderate restriction with multiple trigger points, pt noted relief after manual intervention. Updated HEP. Patient reported feeling well upon departure.       Moisés is progressing well towards his goals.   Pt prognosis is Good.     Pt will continue to benefit from skilled outpatient physical therapy to address the deficits listed in the problem list box on initial evaluation, provide pt/family education and to maximize pt's level of independence in the home and community environment.     Pt's spiritual, cultural and educational needs considered and pt agreeable to plan of care and goals.    Anticipated barriers to physical therapy: None.     Goals:  Short Term Goals: 2 weeks   - Pt will demonstrate excellent knowledge and adherence to HEP to facilitate optimal recovery. (achieved)      Long Term Goals: 12 weeks   - Pt will demonstrate (L) shoulder AROM equal to contralateral side to allow for increased functional of use of LUE. (progressing, not met)  - Pt will demonstrate 4/5 MMT or greater of (L) shoulder musculature to allow for increased functional use of LUE. (progressing, not met)  - Pt will report at least 75% decreased pain with ADLs/IADLs indicating increased functional tolerance with LUE. (progressing, not met)  - Pt will demonstrate FOTO score of 30% limited or less indicating clinically relevant increase in function. (progressing, not met)     Plan     Continue to progress per protocol. Progress resistance training as appropriate.      Stephie Palafox, PT, DPT

## 2019-03-15 NOTE — TELEPHONE ENCOUNTER
Refill readiness for Atripla confirmed with patient; name/ confirmed; no missed doses; no new medications; no side effects noted; address confirmed for 3/18 shipment and 3/19 delivery.      Clinical follow-up conducted for Atripla. Name/ confirmed. no missed doses; no new medications; no side effects noted. Patient understands to report any medication changes to [facility] and provider. All questions answered and addressed to patients satisfaction.      Burt Stone R.Ph., AAHIVP  Clinical Pharmacist, HIV/HCV  [facility]  Phone: [phone]

## 2019-03-18 DIAGNOSIS — R59.0 INGUINAL LYMPHADENOPATHY: Primary | ICD-10-CM

## 2019-03-21 DIAGNOSIS — R59.0 INGUINAL LYMPHADENOPATHY: ICD-10-CM

## 2019-03-21 RX ORDER — SOMATROPIN 6 MG
KIT SUBCUTANEOUS
OUTPATIENT
Start: 2019-03-21

## 2019-03-21 RX ORDER — SODIUM CHLORIDE 9 MG/ML
INJECTION, SOLUTION INTRAVENOUS CONTINUOUS
Status: CANCELLED | OUTPATIENT
Start: 2019-03-21

## 2019-03-22 ENCOUNTER — CLINICAL SUPPORT (OUTPATIENT)
Dept: REHABILITATION | Facility: HOSPITAL | Age: 44
End: 2019-03-22
Attending: ORTHOPAEDIC SURGERY
Payer: MEDICARE

## 2019-03-22 DIAGNOSIS — M25.60 RANGE OF MOTION DEFICIT: ICD-10-CM

## 2019-03-22 DIAGNOSIS — R53.1 WEAKNESS: ICD-10-CM

## 2019-03-22 PROCEDURE — 97110 THERAPEUTIC EXERCISES: CPT

## 2019-03-22 PROCEDURE — 97140 MANUAL THERAPY 1/> REGIONS: CPT

## 2019-03-22 NOTE — PROGRESS NOTES
"  Physical Therapy Daily Treatment Note     Name: Moisés Koehler  Clinic Number: [id number]    Therapy Diagnosis:   Encounter Diagnoses   Name Primary?    Range of motion deficit     Weakness      Physician: Rakesh العلي MD    Visit Date: 3/22/2019    Physician Orders: PT Eval and Treat   subpec tenodesis protocol     Medical Diagnosis from Referral: M25.512,G89.29 (ICD-10-CM) - Chronic left shoulder pain  Surgery Date: 1/14/2019  Evaluation Date: 1/17/2019  Authorization Period Expiration: 12/31/2019  Plan of Care Expiration: 4/12/2019  Visit # / Visits authorized: 9/ 20     Time In: 9:00  Time Out: 9:55  Total Billable Time: 30 minutes     Precautions: HIV positive    Subjective     Pt reports: he is feeling better, he's still trying to lift light weights to test out what his shoulder can do.   He was compliant with home exercise program.  Response to previous treatment: Tolerated well  Functional change: increased ADL tolerance.     Pain: Patient unable to verbalize number  Location: left shoulder      Objective       CMS Impairment/Limitation/Restriction for FOTO Shoulder Survey    Therapist reviewed FOTO scores for Moisés Koehler on 3/22/2019.   FOTO documents entered into [employer] - see Media section.    Limitation Score: 38%  Category: Other    Current : CJ = at least 20% but < 40% impaired, limited or restricted  Goal: CI = at least 1% but < 20% impaired, limited or restricted         TREATMENT:   Moisés received therapeutic exercises to develop ROM for 15 minutes including:  - UBE 4' F/B for active warm up, improved ROM, increased circulation.   - prone rows and extensions 3# 4x10  - SL ER 3# 4x10   - no moneys OTB 4x10 (added to HEP)   - D1 wall slides 20x  - GTB mid rows 3x15 5" hold       Moisés received the following manual therapy techniques: STM applied to the: L shoulder for 15 minutes, including:  - STM/muscle play: deltoid for pain inhibition and STM.     Moisés received hot " pack for 0 minutes to L shoulder prior to session to promote circulation.    Moisés received ice pack for 10 minutes to L shoulder post-session       Home Exercises Provided and Patient Education Provided     Education provided:   - review of POC  - HEP update    Written Home Exercises Provided: Patient instructed to cont prior HEP.   Exercises were reviewed and Moisés was able to demonstrate them prior to the end of the session.  Moisés demonstrated good  understanding of the education provided.       Assessment     Continued resistance exercises aimed at scapular stabilization with good tolerance noted. Increased exercise volume without symptom provocation. STM continued to deltoid revealed improved tissue mobility compared to previous session. FOTO score reflects steady improvement in function, but indicates further skilled intervention is required. Patient reported feeling well upon departure.       Moisés is progressing well towards his goals.   Pt prognosis is Good.     Pt will continue to benefit from skilled outpatient physical therapy to address the deficits listed in the problem list box on initial evaluation, provide pt/family education and to maximize pt's level of independence in the home and community environment.     Pt's spiritual, cultural and educational needs considered and pt agreeable to plan of care and goals.    Anticipated barriers to physical therapy: None.     Goals:  Short Term Goals: 2 weeks   - Pt will demonstrate excellent knowledge and adherence to HEP to facilitate optimal recovery. (achieved)      Long Term Goals: 12 weeks   - Pt will demonstrate (L) shoulder AROM equal to contralateral side to allow for increased functional of use of LUE. (progressing, not met)  - Pt will demonstrate 4/5 MMT or greater of (L) shoulder musculature to allow for increased functional use of LUE. (progressing, not met)  - Pt will report at least 75% decreased pain with ADLs/IADLs indicating  increased functional tolerance with LUE. (progressing, not met)  - Pt will demonstrate FOTO score of 30% limited or less indicating clinically relevant increase in function. (progressing, not met)     Plan     Continue to progress per protocol. Progress resistance training as appropriate.  Continue education regarding prognosis for healing and appropriate progression of exercises post-op.     Stephie Palafox, PT, DPT

## 2019-03-25 ENCOUNTER — PATIENT MESSAGE (OUTPATIENT)
Dept: INFECTIOUS DISEASES | Facility: CLINIC | Age: 44
End: 2019-03-25

## 2019-03-26 ENCOUNTER — TELEPHONE (OUTPATIENT)
Dept: SURGERY | Facility: CLINIC | Age: 44
End: 2019-03-26

## 2019-03-26 NOTE — PRE-PROCEDURE INSTRUCTIONS
Preop instructions: NPO solids/ milk products after midnight and clears up to 7am (clear liquids are: water, apple juice, Gatorade & Jell-O, black coffee/no milk, cream or creamer), shower instructions, directions, leave all valuables at home, medication instructions for PM prior & am of procedure explained. Patient stated an understanding.     Patient denies any side effects or issues with anesthesia or sedation.

## 2019-03-27 ENCOUNTER — ANESTHESIA EVENT (OUTPATIENT)
Dept: SURGERY | Facility: HOSPITAL | Age: 44
End: 2019-03-27
Payer: MEDICARE

## 2019-03-27 ENCOUNTER — HOSPITAL ENCOUNTER (OUTPATIENT)
Facility: HOSPITAL | Age: 44
Discharge: HOME | End: 2019-03-27
Attending: SURGERY | Admitting: SURGERY
Payer: MEDICARE

## 2019-03-27 ENCOUNTER — ANESTHESIA (OUTPATIENT)
Dept: SURGERY | Facility: HOSPITAL | Age: 44
End: 2019-03-27
Payer: MEDICARE

## 2019-03-27 VITALS
RESPIRATION RATE: 17 BRPM | HEART RATE: 75 BPM | BODY MASS INDEX: 25.57 KG/M2 | SYSTOLIC BLOOD PRESSURE: 135 MMHG | WEIGHT: 210 LBS | DIASTOLIC BLOOD PRESSURE: 88 MMHG | TEMPERATURE: 98 F | HEIGHT: 76 IN | OXYGEN SATURATION: 98 %

## 2019-03-27 DIAGNOSIS — R59.0 INGUINAL LYMPHADENOPATHY: Primary | ICD-10-CM

## 2019-03-27 PROCEDURE — D9220A PRA ANESTHESIA: ICD-10-PCS | Mod: CRNA,,, | Performed by: NURSE ANESTHETIST, CERTIFIED REGISTERED

## 2019-03-27 PROCEDURE — 36000707: Performed by: SURGERY

## 2019-03-27 PROCEDURE — 88341 TISSUE SPECIMEN TO PATHOLOGY - SURGERY: ICD-10-PCS | Mod: 26,,, | Performed by: PATHOLOGY

## 2019-03-27 PROCEDURE — 25000003 PHARM REV CODE 250: Performed by: PHYSICIAN ASSISTANT

## 2019-03-27 PROCEDURE — 71000044 HC DOSC ROUTINE RECOVERY FIRST HOUR: Performed by: SURGERY

## 2019-03-27 PROCEDURE — 88184 FLOWCYTOMETRY/ TC 1 MARKER: CPT | Performed by: PATHOLOGY

## 2019-03-27 PROCEDURE — 88342 TISSUE SPECIMEN TO PATHOLOGY - SURGERY: ICD-10-PCS | Mod: 26,,, | Performed by: PATHOLOGY

## 2019-03-27 PROCEDURE — D9220A PRA ANESTHESIA: ICD-10-PCS | Mod: ANES,,, | Performed by: ANESTHESIOLOGY

## 2019-03-27 PROCEDURE — 38500 PR BIOPSY/EXCISION, LYMPH NODE(S): ICD-10-PCS | Mod: LT,GC,, | Performed by: SURGERY

## 2019-03-27 PROCEDURE — 37000009 HC ANESTHESIA EA ADD 15 MINS: Performed by: SURGERY

## 2019-03-27 PROCEDURE — S0020 INJECTION, BUPIVICAINE HYDRO: HCPCS | Performed by: SURGERY

## 2019-03-27 PROCEDURE — 88185 FLOWCYTOMETRY/TC ADD-ON: CPT | Mod: 59 | Performed by: PATHOLOGY

## 2019-03-27 PROCEDURE — 63600175 PHARM REV CODE 636 W HCPCS: Performed by: PHYSICIAN ASSISTANT

## 2019-03-27 PROCEDURE — 88342 IMHCHEM/IMCYTCHM 1ST ANTB: CPT | Performed by: PATHOLOGY

## 2019-03-27 PROCEDURE — 88189 FLOWCYTOMETRY/READ 16 & >: CPT | Mod: ,,, | Performed by: PATHOLOGY

## 2019-03-27 PROCEDURE — D9220A PRA ANESTHESIA: Mod: CRNA,,, | Performed by: NURSE ANESTHETIST, CERTIFIED REGISTERED

## 2019-03-27 PROCEDURE — 36000706: Performed by: SURGERY

## 2019-03-27 PROCEDURE — 63600175 PHARM REV CODE 636 W HCPCS: Performed by: ANESTHESIOLOGY

## 2019-03-27 PROCEDURE — D9220A PRA ANESTHESIA: Mod: ANES,,, | Performed by: ANESTHESIOLOGY

## 2019-03-27 PROCEDURE — 27201423 OPTIME MED/SURG SUP & DEVICES STERILE SUPPLY: Performed by: SURGERY

## 2019-03-27 PROCEDURE — 88307 TISSUE SPECIMEN TO PATHOLOGY - SURGERY: ICD-10-PCS | Mod: 26,,, | Performed by: PATHOLOGY

## 2019-03-27 PROCEDURE — 88307 TISSUE EXAM BY PATHOLOGIST: CPT | Mod: 26,,, | Performed by: PATHOLOGY

## 2019-03-27 PROCEDURE — 37000008 HC ANESTHESIA 1ST 15 MINUTES: Performed by: SURGERY

## 2019-03-27 PROCEDURE — 88341 IMHCHEM/IMCYTCHM EA ADD ANTB: CPT | Mod: 26,,, | Performed by: PATHOLOGY

## 2019-03-27 PROCEDURE — 71000015 HC POSTOP RECOV 1ST HR: Performed by: SURGERY

## 2019-03-27 PROCEDURE — 88189 PR  FLOWCYTOMETRY/READ, 16 & > MARKERS: ICD-10-PCS | Mod: ,,, | Performed by: PATHOLOGY

## 2019-03-27 PROCEDURE — 88342 IMHCHEM/IMCYTCHM 1ST ANTB: CPT | Mod: 26,,, | Performed by: PATHOLOGY

## 2019-03-27 PROCEDURE — 25000003 PHARM REV CODE 250: Performed by: SURGERY

## 2019-03-27 PROCEDURE — 88341 IMHCHEM/IMCYTCHM EA ADD ANTB: CPT | Performed by: PATHOLOGY

## 2019-03-27 PROCEDURE — 63600175 PHARM REV CODE 636 W HCPCS: Performed by: NURSE ANESTHETIST, CERTIFIED REGISTERED

## 2019-03-27 PROCEDURE — 25000003 PHARM REV CODE 250: Performed by: NURSE ANESTHETIST, CERTIFIED REGISTERED

## 2019-03-27 PROCEDURE — 38500 BIOPSY/REMOVAL LYMPH NODES: CPT | Mod: LT,GC,, | Performed by: SURGERY

## 2019-03-27 RX ORDER — PROPOFOL 10 MG/ML
INJECTION, EMULSION INTRAVENOUS CONTINUOUS PRN
Status: DISCONTINUED | OUTPATIENT
Start: 2019-03-27 | End: 2019-03-27

## 2019-03-27 RX ORDER — SODIUM CHLORIDE 0.9 % (FLUSH) 0.9 %
3 SYRINGE (ML) INJECTION
Status: DISCONTINUED | OUTPATIENT
Start: 2019-03-27 | End: 2019-03-27 | Stop reason: HOSPADM

## 2019-03-27 RX ORDER — OXYCODONE AND ACETAMINOPHEN 5; 325 MG/1; MG/1
1 TABLET ORAL EVERY 6 HOURS PRN
Qty: 21 TABLET | Refills: 0 | Status: SHIPPED | OUTPATIENT
Start: 2019-03-27 | End: 2019-12-13

## 2019-03-27 RX ORDER — SODIUM CHLORIDE 9 MG/ML
INJECTION, SOLUTION INTRAVENOUS CONTINUOUS
Status: DISCONTINUED | OUTPATIENT
Start: 2019-03-27 | End: 2019-03-27 | Stop reason: HOSPADM

## 2019-03-27 RX ORDER — FENTANYL CITRATE 50 UG/ML
INJECTION, SOLUTION INTRAMUSCULAR; INTRAVENOUS
Status: DISCONTINUED | OUTPATIENT
Start: 2019-03-27 | End: 2019-03-27

## 2019-03-27 RX ORDER — HYDROMORPHONE HYDROCHLORIDE 1 MG/ML
0.2 INJECTION, SOLUTION INTRAMUSCULAR; INTRAVENOUS; SUBCUTANEOUS EVERY 5 MIN PRN
Status: DISCONTINUED | OUTPATIENT
Start: 2019-03-27 | End: 2019-03-27 | Stop reason: HOSPADM

## 2019-03-27 RX ORDER — OXYCODONE AND ACETAMINOPHEN 5; 325 MG/1; MG/1
1 TABLET ORAL EVERY 4 HOURS PRN
Status: DISCONTINUED | OUTPATIENT
Start: 2019-03-27 | End: 2019-03-27 | Stop reason: HOSPADM

## 2019-03-27 RX ORDER — ONDANSETRON 2 MG/ML
4 INJECTION INTRAMUSCULAR; INTRAVENOUS DAILY PRN
Status: DISCONTINUED | OUTPATIENT
Start: 2019-03-27 | End: 2019-03-27 | Stop reason: HOSPADM

## 2019-03-27 RX ORDER — MIDAZOLAM HYDROCHLORIDE 1 MG/ML
INJECTION, SOLUTION INTRAMUSCULAR; INTRAVENOUS
Status: DISCONTINUED | OUTPATIENT
Start: 2019-03-27 | End: 2019-03-27

## 2019-03-27 RX ORDER — LIDOCAINE HYDROCHLORIDE 10 MG/ML
INJECTION, SOLUTION EPIDURAL; INFILTRATION; INTRACAUDAL; PERINEURAL
Status: DISCONTINUED | OUTPATIENT
Start: 2019-03-27 | End: 2019-03-27 | Stop reason: HOSPADM

## 2019-03-27 RX ORDER — CEFAZOLIN SODIUM 1 G/3ML
2 INJECTION, POWDER, FOR SOLUTION INTRAMUSCULAR; INTRAVENOUS
Status: COMPLETED | OUTPATIENT
Start: 2019-03-27 | End: 2019-03-27

## 2019-03-27 RX ORDER — LIDOCAINE HCL/PF 100 MG/5ML
SYRINGE (ML) INTRAVENOUS
Status: DISCONTINUED | OUTPATIENT
Start: 2019-03-27 | End: 2019-03-27

## 2019-03-27 RX ORDER — IBUPROFEN 800 MG/1
800 TABLET ORAL EVERY 8 HOURS PRN
Qty: 60 TABLET | Refills: 0 | Status: SHIPPED | OUTPATIENT
Start: 2019-03-27 | End: 2019-12-13

## 2019-03-27 RX ORDER — PROPOFOL 10 MG/ML
INJECTION, EMULSION INTRAVENOUS
Status: DISCONTINUED | OUTPATIENT
Start: 2019-03-27 | End: 2019-03-27

## 2019-03-27 RX ORDER — BUPIVACAINE HYDROCHLORIDE 5 MG/ML
INJECTION, SOLUTION EPIDURAL; INTRACAUDAL
Status: DISCONTINUED | OUTPATIENT
Start: 2019-03-27 | End: 2019-03-27 | Stop reason: HOSPADM

## 2019-03-27 RX ADMIN — PROPOFOL 150 MCG/KG/MIN: 10 INJECTION, EMULSION INTRAVENOUS at 11:03

## 2019-03-27 RX ADMIN — PROPOFOL 30 MG: 10 INJECTION, EMULSION INTRAVENOUS at 11:03

## 2019-03-27 RX ADMIN — HYDROMORPHONE HYDROCHLORIDE 0.2 MG: 1 INJECTION, SOLUTION INTRAMUSCULAR; INTRAVENOUS; SUBCUTANEOUS at 01:03

## 2019-03-27 RX ADMIN — HYDROMORPHONE HYDROCHLORIDE 0.2 MG: 1 INJECTION, SOLUTION INTRAMUSCULAR; INTRAVENOUS; SUBCUTANEOUS at 12:03

## 2019-03-27 RX ADMIN — PROPOFOL 70 MG: 10 INJECTION, EMULSION INTRAVENOUS at 11:03

## 2019-03-27 RX ADMIN — LIDOCAINE HYDROCHLORIDE 60 MG: 20 INJECTION, SOLUTION INTRAVENOUS at 11:03

## 2019-03-27 RX ADMIN — CEFAZOLIN 2 G: 330 INJECTION, POWDER, FOR SOLUTION INTRAMUSCULAR; INTRAVENOUS at 11:03

## 2019-03-27 RX ADMIN — SODIUM CHLORIDE: 0.9 INJECTION, SOLUTION INTRAVENOUS at 11:03

## 2019-03-27 RX ADMIN — PROPOFOL 30 MG: 10 INJECTION, EMULSION INTRAVENOUS at 12:03

## 2019-03-27 RX ADMIN — MIDAZOLAM HYDROCHLORIDE 2 MG: 1 INJECTION, SOLUTION INTRAMUSCULAR; INTRAVENOUS at 11:03

## 2019-03-27 RX ADMIN — FENTANYL CITRATE 50 MCG: 50 INJECTION, SOLUTION INTRAMUSCULAR; INTRAVENOUS at 11:03

## 2019-03-27 RX ADMIN — PROPOFOL: 10 INJECTION, EMULSION INTRAVENOUS at 12:03

## 2019-03-27 RX ADMIN — SODIUM CHLORIDE, SODIUM GLUCONATE, SODIUM ACETATE, POTASSIUM CHLORIDE, MAGNESIUM CHLORIDE, SODIUM PHOSPHATE, DIBASIC, AND POTASSIUM PHOSPHATE: .53; .5; .37; .037; .03; .012; .00082 INJECTION, SOLUTION INTRAVENOUS at 11:03

## 2019-03-27 NOTE — BRIEF OP NOTE
[facility]  Brief Operative Note     SUMMARY     Surgery Date: 3/27/2019     Surgeon(s) and Role:     * Patrick Lauren MD - Primary     * Adrianne Dee MD - Resident - Assisting    Assisting Surgeon: None    Pre-op Diagnosis:  Inguinal lymphadenopathy [R59.0]    Post-op Diagnosis:  Post-Op Diagnosis Codes:     * Inguinal lymphadenopathy [R59.0]    Procedure(s) (LRB):  BIOPSY, LYMPH NODE Left Inguinal (Left)    Anesthesia: Local MAC    Description of the findings of the procedure: Excision of two left inguinal lymph nodes.    Findings/Key Components: Two, mobile inguinal lymph nodes on the left.    Estimated Blood Loss: 5mL         Specimens:   Specimen (12h ago, onward)    Start     Ordered    03/27/19 1150  Specimen to Pathology - Surgery  Once     Comments:  1. Left Inguinal Lymph node - Fresh Exam to rule out Lymphoma/Leukemia/Lymphadenopathy2. Left Inguinal Lymph node - Permanent3. Left Inguinal Lymph node - Fresh Exam to rule out Lymphoma/Leukemia/Lymphadenopathy4. Left Inguinal Lymph node - Permanent     Start Status     03/27/19 1150 Collected (03/27/19 1225) Order ID: [id number]       03/27/19 1222    03/27/19 1146  Specimen to Pathology - Surgery  Once     Comments:  1.Left Inguinal lymph node-permanent     Start Status     03/27/19 1146 Collected (03/27/19 1225) Order ID: [id number]       03/27/19 1146          Discharge Note    SUMMARY     Admit Date: 3/27/2019    Discharge Date and Time:  03/27/2019 12:39 PM    Hospital Course (synopsis of major diagnoses, care, treatment, and services provided during the course of the hospital stay): Moisés Koehler underwent outpatient left inguinal lymph node excision as treatment for inguinal lymphadenopathy. He tolerated the procedure well and his post-op course was uncomplicated. Prior to discharge home on 03/27/2019 his pain was well controlled on oral medications and he tolerated a diet. He was discharged home in good condition on  "POD#0.     Final Diagnosis: Post-Op Diagnosis Codes:     * Inguinal lymphadenopathy [R59.0]    Disposition: Home or Self Care    Follow Up/Patient Instructions:     Medications:  Reconciled Home Medications:      Medication List      START taking these medications    oxyCODONE-acetaminophen 5-325 mg per tablet  Commonly known as:  PERCOCET  Take 1 tablet by mouth every 6 (six) hours as needed for Pain (Do not drive while taking pain medications).        CHANGE how you take these medications    * ibuprofen 200 MG tablet  Commonly known as:  ADVIL,MOTRIN  Take 400 mg by mouth every 6 (six) hours as needed for Pain (Pt takes once a week, rare).  What changed:  Another medication with the same name was added. Make sure you understand how and when to take each.     * ibuprofen 800 MG tablet  Commonly known as:  ADVIL,MOTRIN  Take 1 tablet (800 mg total) by mouth every 8 (eight) hours as needed for Pain.  What changed:  You were already taking a medication with the same name, and this prescription was added. Make sure you understand how and when to take each.         * This list has 2 medication(s) that are the same as other medications prescribed for you. Read the directions carefully, and ask your doctor or other care provider to review them with you.            CONTINUE taking these medications    ATRIPLA 600-200-300 mg Tab  Generic drug:  efavirenz-emtrictabine-tenofovir 600-200-300 mg  Take 1 tablet by mouth every evening.     esomeprazole 20 MG capsule  Commonly known as:  NEXIUM  Take 20 mg by mouth before breakfast.     insulin syringe-needle U-100 1 mL 30 gauge X 7/16" Syrg     SEROSTIM 6 mg Solr  Generic drug:  somatropin  nightly.          Discharge Procedure Orders   Diet Adult Regular     No driving until:   Order Comments: Do not drive while taking pain medications.     Notify your health care provider if you experience any of the following:  temperature >100.4     Notify your health care provider if you " experience any of the following:  persistent nausea and vomiting or diarrhea     Notify your health care provider if you experience any of the following:  severe uncontrolled pain     Notify your health care provider if you experience any of the following:  redness, tenderness, or signs of infection (pain, swelling, redness, odor or green/yellow discharge around incision site)     No dressing needed   Order Comments: Incision covered with dermabond (superglue), no dressing needed. Can keep covered with gauze as needed to protect clothing.     Activity as tolerated     Shower on day dressing removed (No bath)   Order Comments: Shower after 48 hours after surgery, do not submerge incisions for 6 weeks.     Follow-up Information     Patrick Lauren MD. Schedule an appointment as soon as possible for a visit in 2 weeks.    Specialty:  General Surgery  Why:  Post-op  Contact information:  [address]  [facility] [address]  [phone]                 Adrianne Dee MD  Pager: [phone]  General Surgery PGY-II  [facility]

## 2019-03-27 NOTE — H&P
"[facility] - General Surgery  General Surgery  History & Physical    Patient Name: Moisés Koehler  MRN: [id number]  Admission Date: 3/27/2019  Primary Care Provider: Primary Doctor No    Patient information was obtained from patient, past medical records and ER records.     Subjective:     Chief Complaint/Reason for Admission: left inner thigh mass    History of Present Illness:  Patient is a 43 y.o. male with no significant PMH who presents with a left inner thigh mass that has been present for the last two months. It has not significantly increased in size. There is some discomfort with lower abdominal and pelvic exercises. He denies any night sweats, fevers, chills, unintentional weight loss or issues with bowel movements. He has a history of L inguinal hernia repair with mesh in 1991.    Interval history:  Patient presents to pre-op area after failed antibiotic management for excision of suspected left thigh lymph node. Denies recent fevers, chills, nausea, vomiting, shortness of breath, chest pain, diarrhea, constipation, night sweats, unintentional weight loss.     No current facility-administered medications on file prior to encounter.      Current Outpatient Medications on File Prior to Encounter   Medication Sig    efavirenz-emtrictabine-tenofovir 600-200-300 mg (ATRIPLA) 600-200-300 mg Tab Take 1 tablet by mouth every evening.    esomeprazole (NEXIUM) 20 MG capsule Take 20 mg by mouth before breakfast.    SEROSTIM 6 mg SolR nightly.     ibuprofen (ADVIL,MOTRIN) 200 MG tablet Take 400 mg by mouth every 6 (six) hours as needed for Pain (Pt takes once a week, rare).    insulin syringe-needle U-100 1 mL 30 gauge X 7/16" Syrg        Review of patient's allergies indicates:  No Known Allergies    Past Medical History:   Diagnosis Date    AC joint dislocation     Arthritis     GERD (gastroesophageal reflux disease) 2008    HIV (human immunodeficiency virus infection)     Hypertension      Past Surgical " History:   Procedure Laterality Date    ARTHROSCOPY, SHOULDER, WITH SUBACROMIAL DECOMPRESSION Left 1/14/2019    [facility]لي [facility] at [facility] OR    COLONOSCOPY N/A 12/13/2018    Performed by [facility]iqua [facility]la [facility] at [facility] ENDO (4TH FLR)    COSMETIC SURGERY      DEBRIDEMENT SLAP, SHOULDER, ARTHROSCOPIC Left 1/14/2019    [facility]h [facility]علي [facility] at [facility] OR    FRACTURE SURGERY      HERNIA REPAIR      INCISION AND DRAINAGE (I&D), ARM Right 12/27/2016    Performed by Richard [facility]ch [facility] at [facility] OR 2ND FLR    KNEE SURGERY      OPEN BICEPS SUBPECTORALIS TENODESIS Left 1/14/2019    Performed by [facility]h العلي [facility] at [facility] OR     Family History     Problem Relation (Age of Onset)    Lung cancer Maternal Grandmother    Stomach cancer Mother        Tobacco Use    Smoking status: Never Smoker    Smokeless tobacco: Never Used   Substance and Sexual Activity    Alcohol use: No    Drug use: Yes    Sexual activity: Not Currently     Partners: Female     Birth control/protection: Condom     Review of Systems   Constitutional: Negative for activity change, appetite change, fever and unexpected weight change.   Eyes: Negative.    Respiratory: Negative.    Cardiovascular: Negative.    Gastrointestinal: Negative.    Genitourinary: Negative for difficulty urinating and dysuria.   Musculoskeletal: Negative.  Negative for arthralgias, neck pain and neck stiffness.   Neurological: Negative.    Hematological: Negative.      Objective:     Vital Signs (Most Recent):  Temp: 97.6 °F (36.4 °C) (03/27/19 0933)  Pulse: 80 (03/27/19 0933)  Resp: 20 (03/27/19 0933)  BP: 138/82 (03/27/19 0933)  SpO2: 97 % (03/27/19 0933) Vital Signs (24h Range):  [unfilled]     Weight: 95.3 kg (210 lb)  Body mass index is 25.56 kg/m².    Physical Exam   Constitutional: He is oriented to person, place, and time. He appears well-developed and well-nourished. No distress.   HENT:   Head: Normocephalic and atraumatic.   Neck:  Normal range of motion. Neck supple.   Cardiovascular: Normal rate and regular rhythm.   Pulmonary/Chest: Effort normal and breath sounds normal.   Abdominal: Soft. He exhibits no distension. There is no tenderness.   Musculoskeletal: Normal range of motion. He exhibits no edema.        Legs:  Neurological: He is alert and oriented to person, place, and time.   Skin: Skin is warm, dry and intact. No abrasion, no bruising, no burn, no lesion, no petechiae and no rash noted. No erythema.        Psychiatric: He has a normal mood and affect. His behavior is normal.       Significant Labs:  None    Significant Diagnostics:  CT abd/pelvis 2/1/19:    FINDINGS:  Visualized heart is normal in size.  No pericardial effusion.    Visualized lungs are clear.  No pleural effusions.    The liver is enlarged.  Hepatic parenchyma demonstrates homogeneous enhancement without focal lesions.  No intrahepatic bile duct dilatation.  Portal vasculature is patent.    Gallbladder is normal.  Common bile duct is normal in caliber.    Stomach, duodenum, spleen, pancreas and adrenal glands are within normal limits.    Kidneys are normal in size and location.  No cortical enhancing lesions.  No nephrolithiasis.  No hydronephrosis or hydroureter.  Bladder is fluid filled and unremarkable.  Prostate is within normal limits.    The small bowel is normal in caliber.  The colon demonstrates no significant abnormalities.  The appendix is not definitely seen.  No obstruction or inflammatory changes.    The abdominal aorta tapers normally without atherosclerotic calcification.    There is an increased number of normal sized periaortic lymph nodes.  No ascites or intra-abdominal free air.  No focal mesenteric masses.    Subcutaneous soft tissues are within normal limits.    No acute fractures or osseous destruction.      Impression       1. No CT findings to suggest femoral or inguinal hernia.       Assessment/Plan:   44 yo M with likely left upper  thigh lymph node    - to OR today for excision of suspected left thigh lymph node  - consent will be obtained    Adrianne Dee MD  General Surgery  [facility] - General Surgery

## 2019-03-27 NOTE — ANESTHESIA PREPROCEDURE EVALUATION
"                                                                                                             03/27/2019  Moisés Koehler is a 43 y.o., male.  Pre-operative evaluation for Procedure(s) (LRB):  BIOPSY, LYMPH NODE Left Inguinal (Left)    Moisés Koehler is a 43 y.o. male     Patient Active Problem List   Diagnosis    HIV positive    Essential hypertension    Chronic heartburn    Rash, skin    Other hemorrhoids    Chronic left shoulder pain    Separation of left acromioclavicular joint    Abdominal pain    Serum lipase elevation    Family history of pancreatic cancer    Superior glenoid labrum lesion of left shoulder    Range of motion deficit    Weakness    Inguinal lymphadenopathy       Review of patient's allergies indicates:  No Known Allergies    No current facility-administered medications on file prior to encounter.      Current Outpatient Medications on File Prior to Encounter   Medication Sig Dispense Refill    efavirenz-emtrictabine-tenofovir 600-200-300 mg (ATRIPLA) 600-200-300 mg Tab Take 1 tablet by mouth every evening. 30 tablet 6    esomeprazole (NEXIUM) 20 MG capsule Take 20 mg by mouth before breakfast.      ibuprofen (ADVIL,MOTRIN) 200 MG tablet Take 400 mg by mouth every 6 (six) hours as needed for Pain (Pt takes once a week, rare).      insulin syringe-needle U-100 1 mL 30 gauge X 7/16" Syrg       SEROSTIM 6 mg SolR nightly.          Past Surgical History:   Procedure Laterality Date    ARTHROSCOPY, SHOULDER, WITH SUBACROMIAL DECOMPRESSION Left 1/14/2019    Per[facility] OR    COLONOSCOPY N/A 12/13/2018    [facility]O (4TH FLR)    COSMETIC SURGERY      DEBRIDEMENT SLAP, SHOULDER, ARTHROSCOPIC Left 1/14/2019    Performed by [facility]علي [facility] at [facility] OR    FRACTURE SURGERY      HERNIA REPAIR      INCISION AND DRAINAGE (I&D), ARM Right 12/27/2016    Performed by Richard Busch MD " at [facility] OR 2ND FLR    KNEE SURGERY      OPEN BICEPS SUBPECTORALIS TENODESIS Left 2019    Performed by Rakesh العلي MD at [facility] [place]       Social History     Socioeconomic History    Marital status: Single     Spouse name: Not on file    Number of children: Not on file    Years of education: Not on file    Highest education level: Not on file   Occupational History    Not on file   Social Needs    Financial resource strain: Not on file    Food insecurity:     Worry: Not on file     Inability: Not on file    Transportation needs:     Medical: Not on file     Non-medical: Not on file   Tobacco Use    Smoking status: Never Smoker    Smokeless tobacco: Never Used   Substance and Sexual Activity    Alcohol use: No    Drug use: Yes    Sexual activity: Not Currently     Partners: Female     Birth control/protection: Condom   Lifestyle    Physical activity:     Days per week: Not on file     Minutes per session: Not on file    Stress: Not on file   Relationships    Social connections:     Talks on phone: Not on file     Gets together: Not on file     Attends [employer] service: Not on file     Active member of club or organization: Not on file     Attends meetings of clubs or organizations: Not on file     Relationship status: Not on file    Intimate partner violence:     Fear of current or ex partner: Not on file     Emotionally abused: Not on file     Physically abused: Not on file     Forced sexual activity: Not on file   Other Topics Concern    Not on file   Social History Narrative    Not on file         CBC: No results for input(s): WBC, RBC, HGB, HCT, PLT, MCV, MCH, MCHC in the last 72 hours.    CMP: No results for input(s): NA, K, CL, CO2, BUN, CREATININE, GLU, MG, PHOS, CALCIUM, ALBUMIN, PROT, ALKPHOS, ALT, AST, BILITOT in the last 72 hours.    INR  No results for input(s): PT, INR, PROTIME, APTT in the last 72 hours.        Diagnostic Studies:      EKD Echo:  No results found  for this or any previous visit.      Anesthesia Evaluation    I have reviewed the Patient Summary Reports.     I have reviewed the Medications.     Review of Systems  Anesthesia Hx:  No problems with previous Anesthesia  History of prior surgery of interest to airway management or planning: Previous anesthesia: General Denies Family Hx of Anesthesia complications.   Denies Personal Hx of Anesthesia complications.   Hematology/Oncology:        Hematology Comments: HIV   Cardiovascular:   Exercise tolerance: good Hypertension    Hepatic/GI:   GERD        Physical Exam  General:  Well nourished    Airway/Jaw/Neck:  Airway Findings: Mouth Opening: Normal Tongue: Normal  General Airway Assessment: Adult  Mallampati: II  Improves to II with phonation.  TM Distance: Normal, at least 6 cm  Jaw/Neck Findings:  Neck ROM: Normal ROM      Dental:  Dental Findings: In tact   Chest/Lungs:  Chest/Lungs Findings: Clear to auscultation, Normal Respiratory Rate     Heart/Vascular:  Heart Findings: Rate: Normal  Rhythm: Regular Rhythm  Sounds: Normal        Mental Status:  Mental Status Findings:  Cooperative, Alert and Oriented         Anesthesia Plan  Type of Anesthesia, risks & benefits discussed:  Anesthesia Type:  general  Patient's Preference:   Intra-op Monitoring Plan: standard ASA monitors  Intra-op Monitoring Plan Comments:   Post Op Pain Control Plan: multimodal analgesia  Post Op Pain Control Plan Comments:   Induction:   IV  Beta Blocker:         Informed Consent: Patient understands risks and agrees with Anesthesia plan.  Questions answered. Anesthesia consent signed with patient.  ASA Score: 3     Day of Surgery Review of History & Physical:    H&P update referred to the surgeon.         Ready For Surgery From Anesthesia Perspective.

## 2019-03-27 NOTE — PLAN OF CARE
Please cancel Order ID Barcode [id number] for Patient ID bar code: [id number]. I made another order as a  Replacement - Order [id number] - The said order is for Fresh examination of the left inguinal lymph node. Thanks!

## 2019-03-27 NOTE — ANESTHESIA POSTPROCEDURE EVALUATION
Anesthesia Post Evaluation    Patient: Moisés Koehler    Procedure(s) Performed: Procedure(s) (LRB):  BIOPSY, LYMPH NODE Left Inguinal (Left)    Final Anesthesia Type: general  Patient location during evaluation: PACU  Patient participation: Yes- Able to Participate  Level of consciousness: awake and alert and awake  Post-procedure vital signs: reviewed and stable  Pain management: adequate  Airway patency: patent  PONV status at discharge: No PONV  Anesthetic complications: no      Cardiovascular status: blood pressure returned to baseline  Respiratory status: unassisted and spontaneous ventilation  Hydration status: euvolemic  Follow-up not needed.          Vitals Value Taken Time   /88 3/27/2019  1:32 PM   Temp 36.4 °C (97.6 °F) 3/27/2019 12:30 PM   Pulse 76 3/27/2019  1:44 PM   Resp 60 3/27/2019  1:44 PM   SpO2 98 % 3/27/2019  1:45 PM   Vitals shown include unvalidated device data.      No case tracking events are documented in the log.      Pain/Ekaterina Score: Pain Rating Prior to Med Admin: 5 (3/27/2019  1:20 PM)

## 2019-03-27 NOTE — PLAN OF CARE
Spoke to Danita Bonilla ([id number]) from Pathology @ 13:19 with regards to the 4 specimens that was sent down for examination. 2 specimens are for Fresh examination while the other 2 are for Permanent, as labeled and requested.    Tran: Swapnil Mac

## 2019-03-27 NOTE — TRANSFER OF CARE
"Anesthesia Transfer of Care Note    Patient: Moisés Koehler    Procedure(s) Performed: Procedure(s) (LRB):  BIOPSY, LYMPH NODE Left Inguinal (Left)    Patient location: [facility]    Anesthesia Type: general    Transport from OR: Transported from OR on 6-10 L/min O2 by face mask with adequate spontaneous ventilation    Post pain: adequate analgesia    Post assessment: no apparent anesthetic complications and tolerated procedure well    Post vital signs: stable    Level of consciousness: awake, alert and oriented    Nausea/Vomiting: no nausea/vomiting    Complications: none    Transfer of care protocol was followed      Last vitals:   Visit Vitals  /82 (BP Location: Right arm, Patient Position: Lying)   Pulse 80   Temp 36.4 °C (97.6 °F) (Oral)   Resp 20   Ht 6' 4" (1.93 m)   Wt 95.3 kg (210 lb)   SpO2 97%   BMI 25.56 kg/m²     "

## 2019-03-27 NOTE — PLAN OF CARE
Pt and liliya given dc instructions and scripts. Understanding verbalized. Pt's pain controlled at this time. Pt with no nausea. Pt going home with Filuh.

## 2019-03-27 NOTE — OP NOTE
DATE: 3/27/19.    PREOPERATIVE DIAGNOSIS: Inguinal lymphadenopathy.    POSTOPERATIVE DIAGNOSIS: Inguinal lymphadenopathy.    PROCEDURE: Excisional biopsy of left inguinal lymph nodes..    ATTENDING SURGEON: Patrick Lauren MD.    RESIDENT: Adrianne Dee MD.    ANESTHESIA: Monitored anesthesia care plus local injection.    INDICATION:  Patient is a 43-year-old male who was referred to my clinic for a femoral hernia.  My exam was consistent with inguinal lymphadenopathy rather than a hernia. He failed to improve tenderness in the area after a trial of antibiotics.  I recommended excisional biopsy for diagnosis and exclusion of the tender node.  We discussed the possibility of the presence of a femoral given its nature of reducing when laying down.  I felt this was unlikely, but consented him for a femoral hernia repair in the event that one was discovered.    PROCEDURE IN DETAIL:  Patient was taken to the operating room and placed supine.  After initiation of monitored anesthesia care, the left groin was prepped and draped in a standard fashion.  Time-out was performed.  Local anesthetic was applied.  Incision was made over the palpable area in the left groin.  This was carried down with electrocautery. Dissection did reveal two superficial, firm lymph nodes in the area of concern.  These were mobilized with combination of blunt and cautery dissection.  Lymphatic channels and tributaries from the saphenous vein were ligated with surgical clips.  Each specimen was passed off the field and sent for flow cytometry and permanent section.  Wound was inspected and palpated.  No other masses were noted.  There was no sign of a femoral hernia or other defect. Hemostasis was confirmed.  The wound was closed in layers with 3-0 Vicryl and subcuticular 4-0 Monocryl. Dermabond was applied.  Patient was awakened from anesthesia and transferred to the PACU in good condition.  All needle and sponge counts were correct at the  conclusion of the case. I was present for the procedure in its entirety.    EBL: 5 mL.    FINDINGS:  2 firm inguinal lymph nodes removed from the area of concern.  Specimens sent for flow cytometry and permanent section.  No other palpable masses or lesions present. No signs of femoral hernia present.    COMPLICATIONS: None.

## 2019-03-28 LAB
FLOW CYTOMETRY ANTIBODIES ANALYZED - LYMPH NODE: NORMAL
FLOW CYTOMETRY COMMENT - LYMPH NODE: NORMAL
FLOW CYTOMETRY INTERPRETATION - LYMPH NODE: NORMAL

## 2019-04-03 ENCOUNTER — PATIENT MESSAGE (OUTPATIENT)
Dept: SURGERY | Facility: CLINIC | Age: 44
End: 2019-04-03

## 2019-04-04 ENCOUNTER — OFFICE VISIT (OUTPATIENT)
Dept: SURGERY | Facility: CLINIC | Age: 44
End: 2019-04-04
Payer: MEDICARE

## 2019-04-04 VITALS
DIASTOLIC BLOOD PRESSURE: 86 MMHG | TEMPERATURE: 98 F | SYSTOLIC BLOOD PRESSURE: 138 MMHG | HEIGHT: 76 IN | WEIGHT: 215.5 LBS | BODY MASS INDEX: 26.24 KG/M2 | HEART RATE: 92 BPM

## 2019-04-04 DIAGNOSIS — R59.1 LYMPHADENOPATHY: Primary | ICD-10-CM

## 2019-04-04 PROCEDURE — 99213 OFFICE O/P EST LOW 20 MIN: CPT | Mod: PBBFAC | Performed by: PHYSICIAN ASSISTANT

## 2019-04-04 PROCEDURE — 99024 POSTOP FOLLOW-UP VISIT: CPT | Mod: POP,,, | Performed by: PHYSICIAN ASSISTANT

## 2019-04-04 PROCEDURE — 99024 PR POST-OP FOLLOW-UP VISIT: ICD-10-PCS | Mod: POP,,, | Performed by: PHYSICIAN ASSISTANT

## 2019-04-04 PROCEDURE — 99999 PR PBB SHADOW E&M-EST. PATIENT-LVL III: ICD-10-PCS | Mod: PBBFAC,,, | Performed by: PHYSICIAN ASSISTANT

## 2019-04-04 PROCEDURE — 99999 PR PBB SHADOW E&M-EST. PATIENT-LVL III: CPT | Mod: PBBFAC,,, | Performed by: PHYSICIAN ASSISTANT

## 2019-04-04 NOTE — PROGRESS NOTES
Moisés Koehler  43 y.o.    No diagnosis found.    SUBJECTIVE:  43 y.o.male presents s/p Excision of lymph node x2.  Patient here today for follow up of lymph node biopsy. Reports area suddenly became more swollen than usual and he is experiencing pain shooting down his leg. There is no drainage or redness, but would like to be checked out.    OBJECTIVE:  Left groin - dermabond in place, swelling appreciated, +TTP, no erythema, no drainage, a bit fluctuant    Pathology- pending    ASSESSMENT:  44 yo male s/p lymph node removal x2, no with incisional swelling    PLAN:  Recommend compression over left leg to help reduce swelling, likely seroma  Return to clinic next week for wound check and pathology.

## 2019-04-09 ENCOUNTER — OFFICE VISIT (OUTPATIENT)
Dept: SURGERY | Facility: CLINIC | Age: 44
End: 2019-04-09
Payer: MEDICARE

## 2019-04-09 ENCOUNTER — TELEPHONE (OUTPATIENT)
Dept: PHARMACY | Facility: CLINIC | Age: 44
End: 2019-04-09

## 2019-04-09 VITALS
SYSTOLIC BLOOD PRESSURE: 138 MMHG | WEIGHT: 215 LBS | TEMPERATURE: 98 F | BODY MASS INDEX: 26.18 KG/M2 | HEART RATE: 96 BPM | DIASTOLIC BLOOD PRESSURE: 89 MMHG | HEIGHT: 76 IN

## 2019-04-09 DIAGNOSIS — R59.1 LYMPHADENOPATHY: Primary | ICD-10-CM

## 2019-04-09 PROCEDURE — 99213 OFFICE O/P EST LOW 20 MIN: CPT | Mod: PBBFAC | Performed by: PHYSICIAN ASSISTANT

## 2019-04-09 PROCEDURE — 99024 PR POST-OP FOLLOW-UP VISIT: ICD-10-PCS | Mod: POP,,, | Performed by: PHYSICIAN ASSISTANT

## 2019-04-09 PROCEDURE — 99999 PR PBB SHADOW E&M-EST. PATIENT-LVL III: CPT | Mod: PBBFAC,,, | Performed by: PHYSICIAN ASSISTANT

## 2019-04-09 PROCEDURE — 99999 PR PBB SHADOW E&M-EST. PATIENT-LVL III: ICD-10-PCS | Mod: PBBFAC,,, | Performed by: PHYSICIAN ASSISTANT

## 2019-04-09 PROCEDURE — 99024 POSTOP FOLLOW-UP VISIT: CPT | Mod: POP,,, | Performed by: PHYSICIAN ASSISTANT

## 2019-04-09 NOTE — PROGRESS NOTES
Moisés Vizcarrakirill Koehler  43 y.o.    No diagnosis found.    SUBJECTIVE:  43 y.o.male presents s/p Excision of lymph node x2.  He has been applying pressure to the wound, but it continues to get bigger. He is still experiencing pain shooting down his leg. There is no drainage or redness. He denies fevers or chills.    OBJECTIVE:  Left groin - dermabond falling off, increased swelling noted, +TTP, mild reactive erythema, no drainage, fluctuant, tight    Pathology- still pending    ASSESSMENT:  42 yo male s/p lymph node removal x2, with incisional swelling    PLAN:  Will drain fluid from swelling in clinic  RTC two weeks - may cancel if condition improves          Procedure note:    Aspiration of 25 cc of SS fluid from the left groin.   Not sent for culture.   Pressure dressing applied in the area.     Eugenie Leigh MD  General Surgery PGY V  Beeper: [phone]

## 2019-04-23 ENCOUNTER — TELEPHONE (OUTPATIENT)
Dept: SURGERY | Facility: CLINIC | Age: 44
End: 2019-04-23

## 2019-05-07 ENCOUNTER — TELEPHONE (OUTPATIENT)
Dept: PHARMACY | Facility: CLINIC | Age: 44
End: 2019-05-07

## 2019-05-30 ENCOUNTER — OFFICE VISIT (OUTPATIENT)
Dept: INFECTIOUS DISEASES | Facility: CLINIC | Age: 44
End: 2019-05-30
Payer: MEDICARE

## 2019-05-30 VITALS
SYSTOLIC BLOOD PRESSURE: 142 MMHG | BODY MASS INDEX: 27.54 KG/M2 | HEART RATE: 90 BPM | DIASTOLIC BLOOD PRESSURE: 82 MMHG | HEIGHT: 76 IN | TEMPERATURE: 99 F | WEIGHT: 226.19 LBS

## 2019-05-30 DIAGNOSIS — Z21 HIV POSITIVE: Primary | ICD-10-CM

## 2019-05-30 PROCEDURE — 99999 PR PBB SHADOW E&M-EST. PATIENT-LVL III: CPT | Mod: PBBFAC,,, | Performed by: INTERNAL MEDICINE

## 2019-05-30 PROCEDURE — 99999 PR PBB SHADOW E&M-EST. PATIENT-LVL III: ICD-10-PCS | Mod: PBBFAC,,, | Performed by: INTERNAL MEDICINE

## 2019-05-30 PROCEDURE — 99213 OFFICE O/P EST LOW 20 MIN: CPT | Mod: S$PBB,,, | Performed by: INTERNAL MEDICINE

## 2019-05-30 PROCEDURE — 99213 OFFICE O/P EST LOW 20 MIN: CPT | Mod: PBBFAC | Performed by: INTERNAL MEDICINE

## 2019-05-30 PROCEDURE — 99213 PR OFFICE/OUTPT VISIT, EST, LEVL III, 20-29 MIN: ICD-10-PCS | Mod: S$PBB,,, | Performed by: INTERNAL MEDICINE

## 2019-05-30 RX ORDER — NIFEDIPINE 30 MG/1
30 TABLET, EXTENDED RELEASE ORAL DAILY
Qty: 30 TABLET | Refills: 11 | Status: SHIPPED | OUTPATIENT
Start: 2019-05-30 | End: 2019-12-13

## 2019-05-30 NOTE — PROGRESS NOTES
Subjective:      Patient ID: Moisés Koehler is a 43 y.o. male.    Chief Complaint:Blood Pressure Check      History of Present Illness  Doing well.  No complaints.  Currently working in production for wrestling matches.  States he sweats a lot and feels hot.  Also notices increase in blood pressure.  He had a left shoulder surgery, which appears to be successful.  He also had a right inguinal lymph node resection, which was benign.    Review of Systems   Constitution: Negative for chills, decreased appetite, fever, malaise/fatigue, night sweats, weight gain and weight loss.   HENT: Negative for congestion, ear pain, hearing loss, hoarse voice, sore throat and tinnitus.    Eyes: Negative for blurred vision, redness and visual disturbance.   Cardiovascular: Negative for chest pain, leg swelling and palpitations.   Respiratory: Negative for cough, hemoptysis, shortness of breath and sputum production.    Hematologic/Lymphatic: Negative for adenopathy. Does not bruise/bleed easily.   Skin: Positive for rash. Negative for dry skin, itching and suspicious lesions.   Musculoskeletal: Negative for back pain, joint pain, myalgias and neck pain.   Gastrointestinal: Positive for heartburn. Negative for abdominal pain, constipation, diarrhea, nausea and vomiting.   Genitourinary: Negative for dysuria, flank pain, frequency, hematuria, hesitancy and urgency.   Neurological: Negative for dizziness, headaches, numbness, paresthesias and weakness.   Psychiatric/Behavioral: Negative for depression and memory loss. The patient has insomnia and is nervous/anxious.      Objective:   Physical Exam   Constitutional: He is oriented to person, place, and time. He appears well-developed and well-nourished.   HENT:   Head: Normocephalic and atraumatic.   Eyes: Pupils are equal, round, and reactive to light. Conjunctivae and EOM are normal.   Cardiovascular: Normal rate, regular rhythm and normal heart sounds.   Pulmonary/Chest: Effort  normal and breath sounds normal.   Abdominal: Soft. Bowel sounds are normal.   Musculoskeletal: Normal range of motion. He exhibits no edema.   Neurological: He is alert and oriented to person, place, and time.   Nursing note and vitals reviewed.    Assessment:       1. HIV positive          Plan:       Check HIV parameters today.  We will start Procardia for his blood pressure.  Discussed other treatments for blood pressure, he elected not to start lisinopril again.  He will follow-up in 2 months for blood pressure check.

## 2019-06-17 RX ORDER — SOMATROPIN 6 MG
KIT SUBCUTANEOUS NIGHTLY
OUTPATIENT
Start: 2019-06-17

## 2019-07-03 ENCOUNTER — TELEPHONE (OUTPATIENT)
Dept: PHARMACY | Facility: CLINIC | Age: 44
End: 2019-07-03

## 2019-08-09 DIAGNOSIS — Z21 HIV POSITIVE: ICD-10-CM

## 2019-08-12 ENCOUNTER — TELEPHONE (OUTPATIENT)
Dept: PHARMACY | Facility: CLINIC | Age: 44
End: 2019-08-12

## 2019-08-12 RX ORDER — EFAVIRENZ, EMTRICITABINE AND TENOFOVIR DISOPROXIL FUMARATE 600; 200; 300 MG/1; MG/1; MG/1
1 TABLET, FILM COATED ORAL NIGHTLY
Qty: 30 TABLET | Refills: 11 | Status: SHIPPED | OUTPATIENT
Start: 2019-08-12 | End: 2020-07-27 | Stop reason: SDUPTHER

## 2019-09-11 ENCOUNTER — TELEPHONE (OUTPATIENT)
Dept: PHARMACY | Facility: CLINIC | Age: 44
End: 2019-09-11

## 2019-09-12 ENCOUNTER — PATIENT MESSAGE (OUTPATIENT)
Dept: ADMINISTRATIVE | Facility: OTHER | Age: 44
End: 2019-09-12

## 2019-09-17 NOTE — TELEPHONE ENCOUNTER
Clinical follow-up conducted for Atripla. Name/ confirmed. Reminded patient to take it WITHOUT a meal. His refill was shipped yesterday and he will receive it today. no missed doses; no new medications; no side effects noted. Patient understands to report any medication changes to [facility] and provider. All questions answered and addressed to patients satisfaction.      Polina Moore, Pharm.D.  Pharmacy Resident, PGY-1   [facility]

## 2019-10-10 ENCOUNTER — TELEPHONE (OUTPATIENT)
Dept: PHARMACY | Facility: CLINIC | Age: 44
End: 2019-10-10

## 2019-10-21 ENCOUNTER — PATIENT MESSAGE (OUTPATIENT)
Dept: PHARMACY | Facility: CLINIC | Age: 44
End: 2019-10-21

## 2019-10-21 ENCOUNTER — TELEPHONE (OUTPATIENT)
Dept: INFECTIOUS DISEASES | Facility: CLINIC | Age: 44
End: 2019-10-21

## 2019-10-21 NOTE — TELEPHONE ENCOUNTER
----- Message from Haritha Rodriguez MA sent at 10/18/2019  9:50 AM CDT -----  All attempts to reach patient regarding his Atripla refill have been unsuccessful. Any assistance you can provide in reaching patient would be greatly appreciated.  Thank you.

## 2019-11-14 ENCOUNTER — TELEPHONE (OUTPATIENT)
Dept: PHARMACY | Facility: CLINIC | Age: 44
End: 2019-11-14

## 2019-11-18 ENCOUNTER — PATIENT MESSAGE (OUTPATIENT)
Dept: PHARMACY | Facility: CLINIC | Age: 44
End: 2019-11-18

## 2019-12-13 ENCOUNTER — OFFICE VISIT (OUTPATIENT)
Dept: INFECTIOUS DISEASES | Facility: CLINIC | Age: 44
End: 2019-12-13
Payer: MEDICARE

## 2019-12-13 VITALS
HEART RATE: 96 BPM | WEIGHT: 219.81 LBS | TEMPERATURE: 98 F | HEIGHT: 76 IN | DIASTOLIC BLOOD PRESSURE: 95 MMHG | BODY MASS INDEX: 26.77 KG/M2 | SYSTOLIC BLOOD PRESSURE: 151 MMHG

## 2019-12-13 DIAGNOSIS — R59.0 INGUINAL LYMPHADENOPATHY: ICD-10-CM

## 2019-12-13 DIAGNOSIS — R59.9 ENLARGED LYMPH NODES: ICD-10-CM

## 2019-12-13 DIAGNOSIS — G89.29 CHRONIC LEFT SHOULDER PAIN: ICD-10-CM

## 2019-12-13 DIAGNOSIS — M25.512 CHRONIC LEFT SHOULDER PAIN: ICD-10-CM

## 2019-12-13 DIAGNOSIS — Z21 HIV POSITIVE: Primary | ICD-10-CM

## 2019-12-13 PROCEDURE — 99214 PR OFFICE/OUTPT VISIT, EST, LEVL IV, 30-39 MIN: ICD-10-PCS | Mod: S$PBB,,, | Performed by: INTERNAL MEDICINE

## 2019-12-13 PROCEDURE — 99999 PR PBB SHADOW E&M-EST. PATIENT-LVL III: ICD-10-PCS | Mod: PBBFAC,,, | Performed by: INTERNAL MEDICINE

## 2019-12-13 PROCEDURE — 99999 PR PBB SHADOW E&M-EST. PATIENT-LVL III: CPT | Mod: PBBFAC,,, | Performed by: INTERNAL MEDICINE

## 2019-12-13 PROCEDURE — 99213 OFFICE O/P EST LOW 20 MIN: CPT | Mod: PBBFAC | Performed by: INTERNAL MEDICINE

## 2019-12-13 PROCEDURE — 99214 OFFICE O/P EST MOD 30 MIN: CPT | Mod: S$PBB,,, | Performed by: INTERNAL MEDICINE

## 2019-12-13 RX ORDER — AMOXICILLIN AND CLAVULANATE POTASSIUM 875; 125 MG/1; MG/1
1 TABLET, FILM COATED ORAL 2 TIMES DAILY
Qty: 28 TABLET | Refills: 0 | Status: SHIPPED | OUTPATIENT
Start: 2019-12-13 | End: 2020-08-13

## 2019-12-13 NOTE — PROGRESS NOTES
Subjective:      Patient ID: Moisés Koehler is a 44 y.o. male.    Chief Complaint:No chief complaint on file.      History of Present Illness    Doing well.  Complains of a sore throat recently.  Also complains that his left thigh mass has recurred.  He complains that the mass is still there, and that it recurred soon after the lymph node resection.  He states that he has had fluid drained from this area, with continued recurrence.  It is not tender or red.  It is causing some pain occasionally.  The pain is described as feeling like hot water running under the skin along the inner thigh of the left leg.  He denies any fevers, chills, or weight loss.    His sore throat was causing some issues with swallowing, but this has improved.    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    Review of Systems   Constitution: Positive for chills, malaise/fatigue and weight loss. Negative for decreased appetite, fever, night sweats and weight gain.   HENT: Positive for sore throat. Negative for congestion, ear pain, hearing loss, hoarse voice and tinnitus.    Eyes: Negative for blurred vision, redness and visual disturbance.   Cardiovascular: Negative for chest pain, leg swelling and palpitations.   Respiratory: Positive for cough. Negative for hemoptysis, shortness of breath and sputum production.    Hematologic/Lymphatic: Negative for adenopathy. Does not bruise/bleed easily.   Skin: Negative for dry skin, itching, rash and suspicious lesions.   Musculoskeletal: Negative for back pain, joint pain, myalgias and neck pain.   Gastrointestinal: Positive for abdominal pain and heartburn. Negative for constipation, diarrhea, nausea and vomiting.   Genitourinary: Negative for dysuria, flank pain, frequency, hematuria, hesitancy and urgency.   Neurological: Negative for dizziness, headaches, numbness, paresthesias and weakness.   Psychiatric/Behavioral: Negative for  depression and memory loss. The patient is nervous/anxious. The patient does not have insomnia.      Objective:   Physical Exam   Constitutional: He is oriented to person, place, and time. He appears well-developed and well-nourished.   HENT:   Head: Normocephalic and atraumatic.   Mouth/Throat: Uvula is midline and mucous membranes are normal. Posterior oropharyngeal erythema present. No oropharyngeal exudate.   Eyes: Pupils are equal, round, and reactive to light. Conjunctivae and EOM are normal.   Cardiovascular: Normal rate, regular rhythm and normal heart sounds.   Pulmonary/Chest: Effort normal and breath sounds normal.   Abdominal: Soft. Bowel sounds are normal.   Musculoskeletal: Normal range of motion. He exhibits no edema.        Legs:  Neurological: He is alert and oriented to person, place, and time.   Nursing note and vitals reviewed.    Assessment:       1. HIV positive    2. Enlarged lymph nodes    3. Inguinal lymphadenopathy    4. Chronic left shoulder pain          Plan:       Check HIV parameters today.  Will repeat CT scan to evaluate possible recurrence of lymphadenopathy.  This may be a seroma.    Continue Serostim for weight loss.  His weight appears to have stabilized for now.    Start Augmentin for possible pharyngitis.     Patient had questions regarding using Atripla for HIV, since he has seen a number of advertisements regarding legal action for patients who take the drug.  I assured him that Atripla is not causing him any problems currently, and that I am monitoring all the known issues with taking this medication.  I offered to switch him to Symfi: he will consider this.      Follow-up in 1 month.  I will call him regarding CT scan results.

## 2019-12-18 ENCOUNTER — PATIENT MESSAGE (OUTPATIENT)
Dept: PHARMACY | Facility: CLINIC | Age: 44
End: 2019-12-18

## 2019-12-18 ENCOUNTER — TELEPHONE (OUTPATIENT)
Dept: PHARMACY | Facility: CLINIC | Age: 44
End: 2019-12-18

## 2019-12-18 NOTE — TELEPHONE ENCOUNTER
Refill readiness for Atripla confirmed with patient; name/ confirmed; no missed doses; no new medications; no side effects noted; address confirmed for  shipment and  delivery.      MATT Fish.Ph., AAHIVP  Clinical Pharmacist, HIV/HCV  [facility]  Phone: [phone]

## 2019-12-18 NOTE — TELEPHONE ENCOUNTER
Patient called for refill readiness and follow up on Atripla.  No answer - lvm for call back.     Burt Stone, MATT.Ph., AAHIVP  Clinical Pharmacist, HIV/HCV  [facility]  Phone: [phone]

## 2020-01-03 ENCOUNTER — HOSPITAL ENCOUNTER (OUTPATIENT)
Dept: RADIOLOGY | Facility: HOSPITAL | Age: 45
Discharge: HOME | End: 2020-01-03
Attending: INTERNAL MEDICINE
Payer: MEDICARE

## 2020-01-03 DIAGNOSIS — R59.9 ENLARGED LYMPH NODES: ICD-10-CM

## 2020-01-03 PROCEDURE — 74177 CT ABD & PELVIS W/CONTRAST: CPT | Mod: TC

## 2020-01-03 PROCEDURE — 25500020 PHARM REV CODE 255: Performed by: INTERNAL MEDICINE

## 2020-01-03 PROCEDURE — 71260 CT CHEST ABDOMEN PELVIS WITH CONTRAST (XPD): ICD-10-PCS | Mod: 26,,, | Performed by: RADIOLOGY

## 2020-01-03 PROCEDURE — 74177 CT ABD & PELVIS W/CONTRAST: CPT | Mod: 26,,, | Performed by: RADIOLOGY

## 2020-01-03 PROCEDURE — 74177 CT CHEST ABDOMEN PELVIS WITH CONTRAST (XPD): ICD-10-PCS | Mod: 26,,, | Performed by: RADIOLOGY

## 2020-01-03 PROCEDURE — 71260 CT THORAX DX C+: CPT | Mod: 26,,, | Performed by: RADIOLOGY

## 2020-01-03 RX ADMIN — IOHEXOL 15 ML: 350 INJECTION, SOLUTION INTRAVENOUS at 12:01

## 2020-01-03 RX ADMIN — IOHEXOL 100 ML: 350 INJECTION, SOLUTION INTRAVENOUS at 12:01

## 2020-01-20 ENCOUNTER — PATIENT MESSAGE (OUTPATIENT)
Dept: PHARMACY | Facility: CLINIC | Age: 45
End: 2020-01-20

## 2020-01-21 NOTE — TELEPHONE ENCOUNTER
Refill readiness for Atripla confirmed with patient; name/ confirmed; no missed doses; no new medications; no side effects noted; address confirmed for  shipment and  delivery.      MATT Fish.Ph., AAHIVP  Clinical Pharmacist, HIV/HCV  [facility]  Phone: [phone]

## 2020-01-23 ENCOUNTER — PATIENT MESSAGE (OUTPATIENT)
Dept: INFECTIOUS DISEASES | Facility: CLINIC | Age: 45
End: 2020-01-23

## 2020-02-18 ENCOUNTER — TELEPHONE (OUTPATIENT)
Dept: PHARMACY | Facility: CLINIC | Age: 45
End: 2020-02-18

## 2020-02-27 ENCOUNTER — OFFICE VISIT (OUTPATIENT)
Dept: INFECTIOUS DISEASES | Facility: CLINIC | Age: 45
End: 2020-02-27
Payer: MEDICARE

## 2020-02-27 VITALS
DIASTOLIC BLOOD PRESSURE: 94 MMHG | HEART RATE: 84 BPM | WEIGHT: 221.81 LBS | TEMPERATURE: 99 F | BODY MASS INDEX: 27.01 KG/M2 | HEIGHT: 76 IN | SYSTOLIC BLOOD PRESSURE: 151 MMHG

## 2020-02-27 DIAGNOSIS — K04.7 TOOTH ABSCESS: ICD-10-CM

## 2020-02-27 DIAGNOSIS — Z21 HIV POSITIVE: Primary | ICD-10-CM

## 2020-02-27 PROCEDURE — 99999 PR PBB SHADOW E&M-EST. PATIENT-LVL III: ICD-10-PCS | Mod: PBBFAC,,, | Performed by: INTERNAL MEDICINE

## 2020-02-27 PROCEDURE — 99214 PR OFFICE/OUTPT VISIT, EST, LEVL IV, 30-39 MIN: ICD-10-PCS | Mod: S$PBB,,, | Performed by: INTERNAL MEDICINE

## 2020-02-27 PROCEDURE — 99214 OFFICE O/P EST MOD 30 MIN: CPT | Mod: S$PBB,,, | Performed by: INTERNAL MEDICINE

## 2020-02-27 PROCEDURE — 99213 OFFICE O/P EST LOW 20 MIN: CPT | Mod: PBBFAC | Performed by: INTERNAL MEDICINE

## 2020-02-27 PROCEDURE — 99999 PR PBB SHADOW E&M-EST. PATIENT-LVL III: CPT | Mod: PBBFAC,,, | Performed by: INTERNAL MEDICINE

## 2020-02-27 RX ORDER — CLINDAMYCIN HYDROCHLORIDE 150 MG/1
300 CAPSULE ORAL EVERY 8 HOURS
Qty: 84 CAPSULE | Refills: 2 | Status: SHIPPED | OUTPATIENT
Start: 2020-02-27 | End: 2020-02-27 | Stop reason: SDUPTHER

## 2020-02-27 RX ORDER — CLINDAMYCIN HYDROCHLORIDE 150 MG/1
300 CAPSULE ORAL EVERY 8 HOURS
Qty: 84 CAPSULE | Refills: 2 | Status: SHIPPED | OUTPATIENT
Start: 2020-02-27 | End: 2020-03-13

## 2020-02-27 RX ORDER — LISINOPRIL 10 MG/1
10 TABLET ORAL DAILY
Qty: 30 TABLET | Refills: 11 | Status: SHIPPED | OUTPATIENT
Start: 2020-02-27 | End: 2020-02-27 | Stop reason: SDUPTHER

## 2020-02-27 RX ORDER — AMOXICILLIN AND CLAVULANATE POTASSIUM 875; 125 MG/1; MG/1
1 TABLET, FILM COATED ORAL 2 TIMES DAILY
Qty: 28 TABLET | Refills: 2 | Status: SHIPPED | OUTPATIENT
Start: 2020-02-27 | End: 2020-03-13

## 2020-02-27 RX ORDER — AMOXICILLIN AND CLAVULANATE POTASSIUM 875; 125 MG/1; MG/1
1 TABLET, FILM COATED ORAL 2 TIMES DAILY
Qty: 28 TABLET | Refills: 2 | Status: SHIPPED | OUTPATIENT
Start: 2020-02-27 | End: 2020-02-27 | Stop reason: SDUPTHER

## 2020-02-27 RX ORDER — LISINOPRIL 10 MG/1
10 TABLET ORAL DAILY
Qty: 30 TABLET | Refills: 11 | Status: SHIPPED | OUTPATIENT
Start: 2020-02-27 | End: 2020-05-12 | Stop reason: SDUPTHER

## 2020-02-27 NOTE — PROGRESS NOTES
Subjective:      Patient ID: Moisés Koehler is a 44 y.o. male.    Chief Complaint:Follow-up      History of Present Illness    Complains of dental abscesses.  He went to [facility] for evaluation and found that he required a 400 dollar co-pay.  His face is swollen and tender in the left maxillary area where the tooth is painful.  Sensodyne no longer helps the pain.  The pain in his left leg is improved with exercise.  He still feels pain and pulling where he suspects a hernia is in place.  The burning pain in his left leg has improved however.    Review of Systems   Constitution: Positive for fever.   HENT:        Pain in the left maxilla with carious teeth.   Cardiovascular: Negative.    Gastrointestinal: Negative.    Genitourinary: Negative.    Neurological: Negative.    All other systems reviewed and are negative.    Objective:   Physical Exam   Constitutional: He appears well-developed.   HENT:   Head: Normocephalic and atraumatic.   Nose: Left sinus exhibits maxillary sinus tenderness.       Mouth/Throat: Dental caries present.       Eyes: Pupils are equal, round, and reactive to light. Conjunctivae and EOM are normal.   Cardiovascular: Normal rate, regular rhythm and normal heart sounds.   Pulmonary/Chest: Effort normal.   Musculoskeletal: Normal range of motion. He exhibits no edema.   Neurological: He is alert.   Nursing note and vitals reviewed.    Assessment:       1. HIV positive    2. Tooth abscess          Plan:       He will start Augmentin and clindamycin today.  He will go to [facility] dental school to see if he can get a cheaper evaluation and dental extraction.  We discussed the possibility of a left inguinal hernia.  This does not appear to be evident physically or on imaging studies.  He will consider a 2nd surgical opinion to evaluate this.  He will continue on his antiretrovirals for HIV.  We will check his HIV parameters at a later time.

## 2020-03-19 ENCOUNTER — PATIENT MESSAGE (OUTPATIENT)
Dept: PHARMACY | Facility: CLINIC | Age: 45
End: 2020-03-19

## 2020-04-16 ENCOUNTER — TELEPHONE (OUTPATIENT)
Dept: PHARMACY | Facility: CLINIC | Age: 45
End: 2020-04-16

## 2020-04-21 ENCOUNTER — PATIENT MESSAGE (OUTPATIENT)
Dept: PHARMACY | Facility: CLINIC | Age: 45
End: 2020-04-21

## 2020-05-12 RX ORDER — LISINOPRIL 10 MG/1
10 TABLET ORAL DAILY
Qty: 30 TABLET | Refills: 11 | Status: SHIPPED | OUTPATIENT
Start: 2020-05-12 | End: 2022-05-12

## 2020-05-13 ENCOUNTER — TELEPHONE (OUTPATIENT)
Dept: PHARMACY | Facility: CLINIC | Age: 45
End: 2020-05-13

## 2020-05-19 ENCOUNTER — PATIENT MESSAGE (OUTPATIENT)
Dept: PHARMACY | Facility: CLINIC | Age: 45
End: 2020-05-19

## 2020-06-11 ENCOUNTER — TELEPHONE (OUTPATIENT)
Dept: PHARMACY | Facility: CLINIC | Age: 45
End: 2020-06-11

## 2020-07-09 ENCOUNTER — HOSPITAL ENCOUNTER (OUTPATIENT)
Dept: RADIOLOGY | Facility: HOSPITAL | Age: 45
Discharge: HOME | End: 2020-07-09
Attending: ORTHOPAEDIC SURGERY
Payer: MEDICARE

## 2020-07-09 ENCOUNTER — OFFICE VISIT (OUTPATIENT)
Dept: SPORTS MEDICINE | Facility: CLINIC | Age: 45
End: 2020-07-09
Payer: MEDICARE

## 2020-07-09 VITALS
BODY MASS INDEX: 26.91 KG/M2 | SYSTOLIC BLOOD PRESSURE: 143 MMHG | HEIGHT: 76 IN | WEIGHT: 221 LBS | DIASTOLIC BLOOD PRESSURE: 89 MMHG | HEART RATE: 105 BPM

## 2020-07-09 DIAGNOSIS — M25.512 LEFT SHOULDER PAIN, UNSPECIFIED CHRONICITY: ICD-10-CM

## 2020-07-09 DIAGNOSIS — M25.512 LEFT SHOULDER PAIN, UNSPECIFIED CHRONICITY: Primary | ICD-10-CM

## 2020-07-09 PROCEDURE — 99213 OFFICE O/P EST LOW 20 MIN: CPT | Mod: S$PBB,,, | Performed by: ORTHOPAEDIC SURGERY

## 2020-07-09 PROCEDURE — 99999 PR PBB SHADOW E&M-EST. PATIENT-LVL IV: ICD-10-PCS | Mod: PBBFAC,,, | Performed by: ORTHOPAEDIC SURGERY

## 2020-07-09 PROCEDURE — 99214 OFFICE O/P EST MOD 30 MIN: CPT | Mod: PBBFAC | Performed by: ORTHOPAEDIC SURGERY

## 2020-07-09 PROCEDURE — 99999 PR PBB SHADOW E&M-EST. PATIENT-LVL IV: CPT | Mod: PBBFAC,,, | Performed by: ORTHOPAEDIC SURGERY

## 2020-07-09 PROCEDURE — 99213 PR OFFICE/OUTPT VISIT, EST, LEVL III, 20-29 MIN: ICD-10-PCS | Mod: S$PBB,,, | Performed by: ORTHOPAEDIC SURGERY

## 2020-07-09 NOTE — PROGRESS NOTES
"CC: LEFT shoulder pain     44 y.o. Male with a history of left shoulder biceps muscle belly "twitching" for 5 days after waking up.  No changes in diet or exercise.  No injuries.     He reports that the pain and weakness is worse with overhead activity. It also bothers him at night.    Is affecting ADLs.  Pain is 0/10 at it's worst.    DATE OF SURGERY:  1/14/19      PREOPERATIVE DIAGNOSES:   1. Left shoulder biceps tendinopathy   2. Left shoulder SLAP tear  3. Left shoulder glenohumeral degenerative arthritis     POSTOPERATIVE DIAGNOSES:   1. Left shoulder biceps tendinopathy   2. Left shoulder SLAP tear  3. Left shoulder glenohumeral degenerative arthritis     PROCEDURE:   1. Left shoulder open subpectoral biceps tenodesis   2. Left shoulder arthroscopic debridement labrum  3. Left shoulder arthroscopic chondroplasty glenohumeral joint  4. Left shoulder arthroscopic subacromial decompression.      SURGEON: Rakesh العلي M.D.       Past Medical History:   Diagnosis Date    AC joint dislocation     Arthritis     GERD (gastroesophageal reflux disease) 2008    HIV (human immunodeficiency virus infection)     Hypertension        Past Surgical History:   Procedure Laterality Date    ARTHROSCOPIC DEBRIDEMENT OF SHOULDER Left 1/14/2019    Procedure: DEBRIDEMENT SLAP, SHOULDER, ARTHROSCOPIC;  Surgeon: Rakesh العلي MD;  Location: [facility];  Service: Orthopedics;  Laterality: Left;  regional w/o catheter     ARTHROSCOPY OF SHOULDER WITH DECOMPRESSION OF SUBACROMIAL SPACE Left 1/14/2019    Procedure: ARTHROSCOPY, SHOULDER, WITH SUBACROMIAL DECOMPRESSION;  Surgeon: Rakesh العلي MD;  Location: [facility] OR;  Service: Orthopedics;  Laterality: Left;    COLONOSCOPY N/A 12/13/2018    Procedure: COLONOSCOPY;  Surgeon: Shaniqua Chawla MD;  Location: [facility] ENDO [address]);  Service: Endoscopy;  Laterality: N/A;  schedule as 45 minute case ASAP     COSMETIC SURGERY      FIXATION OF TENDON Left 1/14/2019    " "Procedure: OPEN BICEPS SUBPECTORALIS TENODESIS;  Surgeon: Rakesh العلي MD;  Location: [facility] OR;  Service: Orthopedics;  Laterality: Left;    FRACTURE SURGERY      HERNIA REPAIR      KNEE SURGERY      LYMPH NODE BIOPSY Left 3/27/2019    Procedure: BIOPSY, LYMPH NODE Left Inguinal;  Surgeon: Patrick Lauren MD;  [facility];  Service: General;  Laterality: Left;       Family History   Problem Relation Age of Onset    Stomach cancer Mother     Lung cancer Maternal Grandmother     Melanoma Neg Hx     Celiac disease Neg Hx     Cirrhosis Neg Hx     Colon cancer Neg Hx     Colon polyps Neg Hx     Crohn's disease Neg Hx     Cystic fibrosis Neg Hx     Esophageal cancer Neg Hx     Hemochromatosis Neg Hx     Inflammatory bowel disease Neg Hx     Irritable bowel syndrome Neg Hx     Liver cancer Neg Hx     Liver disease Neg Hx     Rectal cancer Neg Hx     Ulcerative colitis Neg Hx     Wilner's disease Neg Hx     Lymphoma Neg Hx     Tuberculosis Neg Hx     Scleroderma Neg Hx     Rheum arthritis Neg Hx     Multiple sclerosis Neg Hx     Lupus Neg Hx     Psoriasis Neg Hx     Skin cancer Neg Hx          Current Outpatient Medications:     efavirenz-emtrictabine-tenofovir 600-200-300 mg (ATRIPLA) 600-200-300 mg Tab, Take 1 tablet by mouth every evening., Disp: 30 tablet, Rfl: 11    esomeprazole (NEXIUM) 20 MG capsule, Take 20 mg by mouth before breakfast., Disp: , Rfl:     insulin syringe-needle U-100 1 mL 30 gauge X 7/16" Syrg, , Disp: , Rfl:     lisinopriL 10 MG tablet, Take 1 tablet (10 mg total) by mouth once daily., Disp: 30 tablet, Rfl: 11    SEROSTIM 6 mg SolR, INJECT UNDER THE SKIN EVERY DAY, Disp: 30 each, Rfl: 11    Review of patient's allergies indicates:  No Known Allergies       REVIEW OF SYSTEMS:  Constitution: Negative. Negative for chills, fever and night sweats.   HENT: Negative for congestion and headaches.    Eyes: Negative for blurred vision, left vision " "loss and right vision loss.   Cardiovascular: Negative for chest pain and syncope.   Respiratory: Negative for cough and shortness of breath.    Endocrine: Negative for polydipsia, polyphagia and polyuria.   Hematologic/Lymphatic: Negative for bleeding problem. Does not bruise/bleed easily.   Skin: Negative for dry skin, itching and rash.   Musculoskeletal: Negative for falls.  Positive for left shoulder pain and muscle weakness.   Gastrointestinal: Negative for abdominal pain and bowel incontinence.   Genitourinary: Negative for bladder incontinence and nocturia.   Neurological: Negative for disturbances in coordination, loss of balance and seizures.   Psychiatric/Behavioral: Negative for depression. The patient does not have insomnia.    Allergic/Immunologic: Negative for hives and persistent infections.      PHYSICAL EXAMINATION:  Vitals:  BP (!) 143/89   Pulse 105   Ht 6' 4" (1.93 m)   Wt 100.2 kg (221 lb)   BMI 26.90 kg/m²    General: The patient is alert and oriented x 3.  Mood is pleasant.  Observation of ears, eyes and nose reveal no gross abnormalities.  No labored breathing observed.  Gait is coordinated. Patient can toe walk and heel walk without difficulty.      LEFT Shoulder / Upper Extremity Exam    OBSERVATION:     Swelling  none  Deformity  none   Discoloration  none   Scapular winging none   Scars   none  Atrophy  none    TENDERNESS / CREPITUS (T/C):          T/C      T/C   Clavicle   -/-  SUPRAspinatus    -/-     AC Jt.    -/-  INFRAspinatus  -/-    SC Jt.    -/-  Deltoid    -/-      G. Tuberosity  -/-  LH BICEP groove  -/-   Acromion:  -/-  Midline Neck   -/-     Scapular Spine -/-  Trapezium   -/-   SMA Scapula  -/-  GH jt. line - post  -/-     Scapulothoracic  -/-         ROM: (* = with pain)  Right shoulder   Left shoulder        AROM (PROM)   AROM (PROM)   FE    170° (175°)     175° (175°)     ER at 90° ABD  90°  (90°)    90°  (90°)   IR (spine level)   T10     T10    STRENGTH: (* = " with pain) Right shoulder   Left shoulder    SCAPTION   5/5    5/5    IR    5/5    5/5   ER    5/5    5/5   BICEPS   5/5    5/5   Deltoid    5/5    5/5     SIGNS:  Painful side       NEER   -    OPARTHS  neg    WALDEN   -    SPEEDS  neg     DROP ARM   -   BELLY PRESS neg   Superior escape none    LIFT-OFF  neg   X-Body ADD    neg    MOVING VALGUS neg        STABILITY TESTING    Right shoulder   Left shoulder    Translation     Anterior  up face     up face    Posterior  up face    up face    Sulcus   < 10mm    < 10 mm     Signs   Apprehension   neg      neg       Relocation   no change     no change      Jerk test  neg     neg    EXTREMITY NEURO-VASCULAR EXAM:    Sensation grossly intact to light touch all dermatomal regions.    DTR 2+ Biceps, Triceps, BR and Negative Kyles sign   Grossly intact motor function at Elbow, Wrist and Hand   Distal pulses radial and ulnar 2+, brisk cap refill, symmetric.      NECK:  Painless FROM and spinous processes non-tender. Negative Spurlings sign.        ASSESSMENT:   Left shoulder biceps muscle belly spasms    PLAN:    1. Physical therapy at [place]  2. Follow up with PCP for updated labs  3. Follow up 3 months

## 2020-07-14 ENCOUNTER — TELEPHONE (OUTPATIENT)
Dept: PHARMACY | Facility: CLINIC | Age: 45
End: 2020-07-14

## 2020-07-20 PROBLEM — M25.512 LEFT SHOULDER PAIN: Status: ACTIVE | Noted: 2020-07-20

## 2020-07-24 NOTE — TELEPHONE ENCOUNTER
Patient responded via [facility] regarding Atripla at [facility].  Will prepare for shipment with patient consent on  to arrive .  Copay 0.00.  Patient has not started any new medications including OTC drugs. Patient has not had any medication/ dose or instruction changes. No new allergies or side effects reported with this shipment. Medication is being taken as prescribed by physician and properly stored. Two patient identifiers:  and Address verified. Patient has no questions or concerns for [facility].

## 2020-07-27 DIAGNOSIS — Z21 HIV POSITIVE: ICD-10-CM

## 2020-07-27 RX ORDER — EFAVIRENZ, EMTRICITABINE AND TENOFOVIR DISOPROXIL FUMARATE 600; 200; 300 MG/1; MG/1; MG/1
1 TABLET, FILM COATED ORAL NIGHTLY
Qty: 30 TABLET | Refills: 11 | Status: SHIPPED | OUTPATIENT
Start: 2020-07-27 | End: 2020-07-27 | Stop reason: SDUPTHER

## 2020-07-27 RX ORDER — EFAVIRENZ, EMTRICITABINE AND TENOFOVIR DISOPROXIL FUMARATE 600; 200; 300 MG/1; MG/1; MG/1
1 TABLET, FILM COATED ORAL NIGHTLY
Qty: 30 TABLET | Refills: 11 | Status: SHIPPED | OUTPATIENT
Start: 2020-07-27 | End: 2021-08-17 | Stop reason: SDUPTHER

## 2020-07-27 NOTE — TELEPHONE ENCOUNTER
----- Message from Kimberly Bean sent at 7/27/2020 10:00 AM CDT -----  Regarding: URGENT Atripla reffill  Good morning!!    My name is Kimberly I'm from [facility]. The patient is out of refills for his Atripla and only has three doses left on hand. Can someone expedite his refill request to ensure the patient doesn't run out of medication

## 2020-08-20 ENCOUNTER — TELEPHONE (OUTPATIENT)
Dept: PHARMACY | Facility: CLINIC | Age: 45
End: 2020-08-20

## 2020-09-18 ENCOUNTER — TELEPHONE (OUTPATIENT)
Dept: PHARMACY | Facility: CLINIC | Age: 45
End: 2020-09-18

## 2020-09-22 ENCOUNTER — PATIENT MESSAGE (OUTPATIENT)
Dept: PHARMACY | Facility: CLINIC | Age: 45
End: 2020-09-22

## 2020-10-26 ENCOUNTER — SPECIALTY PHARMACY (OUTPATIENT)
Dept: PHARMACY | Facility: CLINIC | Age: 45
End: 2020-10-26

## 2020-11-11 ENCOUNTER — HOSPITAL ENCOUNTER (OUTPATIENT)
Dept: RADIOLOGY | Facility: HOSPITAL | Age: 45
Discharge: HOME | End: 2020-11-11
Attending: INTERNAL MEDICINE
Payer: MEDICARE

## 2020-11-11 ENCOUNTER — OFFICE VISIT (OUTPATIENT)
Dept: INFECTIOUS DISEASES | Facility: CLINIC | Age: 45
End: 2020-11-11
Payer: MEDICARE

## 2020-11-11 VITALS
HEIGHT: 76 IN | BODY MASS INDEX: 25.45 KG/M2 | TEMPERATURE: 98 F | DIASTOLIC BLOOD PRESSURE: 95 MMHG | SYSTOLIC BLOOD PRESSURE: 145 MMHG | WEIGHT: 209 LBS | HEART RATE: 103 BPM

## 2020-11-11 DIAGNOSIS — S62.627A CLOSED DISPLACED FRACTURE OF MIDDLE PHALANX OF LEFT LITTLE FINGER, INITIAL ENCOUNTER: ICD-10-CM

## 2020-11-11 DIAGNOSIS — K04.7 TOOTH ABSCESS: ICD-10-CM

## 2020-11-11 DIAGNOSIS — F32.1 CURRENT MODERATE EPISODE OF MAJOR DEPRESSIVE DISORDER, UNSPECIFIED WHETHER RECURRENT: ICD-10-CM

## 2020-11-11 DIAGNOSIS — Z21 HIV POSITIVE: Primary | ICD-10-CM

## 2020-11-11 PROCEDURE — 99999 PR PBB SHADOW E&M-EST. PATIENT-LVL IV: ICD-10-PCS | Mod: PBBFAC,,, | Performed by: INTERNAL MEDICINE

## 2020-11-11 PROCEDURE — 99214 OFFICE O/P EST MOD 30 MIN: CPT | Mod: S$PBB,,, | Performed by: INTERNAL MEDICINE

## 2020-11-11 PROCEDURE — 99214 OFFICE O/P EST MOD 30 MIN: CPT | Mod: PBBFAC,25 | Performed by: INTERNAL MEDICINE

## 2020-11-11 PROCEDURE — 73130 X-RAY EXAM OF HAND: CPT | Mod: 26,LT,, | Performed by: RADIOLOGY

## 2020-11-11 PROCEDURE — 99214 PR OFFICE/OUTPT VISIT, EST, LEVL IV, 30-39 MIN: ICD-10-PCS | Mod: S$PBB,,, | Performed by: INTERNAL MEDICINE

## 2020-11-11 PROCEDURE — 73130 X-RAY EXAM OF HAND: CPT | Mod: TC,LT

## 2020-11-11 PROCEDURE — 99999 PR PBB SHADOW E&M-EST. PATIENT-LVL IV: CPT | Mod: PBBFAC,,, | Performed by: INTERNAL MEDICINE

## 2020-11-11 PROCEDURE — 73130 XR HAND COMPLETE 3 VIEW LEFT: ICD-10-PCS | Mod: 26,LT,, | Performed by: RADIOLOGY

## 2020-11-11 NOTE — PROGRESS NOTES
Subjective:      Patient ID: Moisés Koehler is a 45 y.o. male.    Chief Complaint:HIV Positive/AIDS and Finger Injury      History of Present Illness  Patient has a number of complaints today.  He had an abscess in his left maxillary area, which was treated with Augmentin.  He has finished Augmentin.  He continues to have pain and swelling in this area.    He is suffering with depression.  He has anhedonia, low energy, poor appetite.  He has lost 16 lb.  He stopped using testosterone injections approximately 3 months ago.  He has had relationship issues with his long-term girlfriend.  He is having financial and job related problems as well.  The house that he is renting is going up for sale as well.    He denies having suicidal plans.  He does have occasional suicidal thoughts.  He is not having any homicidal ideation.    He had trauma to his left little finger some time ago.  He states that he is having numbness and occasional discoloration of the finger.    His last HIV parameters were checked about a year ago.  He has been adherent with his antiretroviral medication and his serostim for HIV wasting.      Review of Systems   Constitution: Negative for chills, decreased appetite, fever, malaise/fatigue, night sweats, weight gain and weight loss.   HENT: Negative for congestion, ear pain, hearing loss, hoarse voice, sore throat and tinnitus.    Eyes: Negative for blurred vision, redness and visual disturbance.   Cardiovascular: Negative for chest pain, leg swelling and palpitations.   Respiratory: Negative for cough, hemoptysis, shortness of breath and sputum production.    Hematologic/Lymphatic: Negative for adenopathy. Does not bruise/bleed easily.   Skin: Negative for dry skin, itching, rash and suspicious lesions.   Musculoskeletal: Positive for joint pain. Negative for back pain, myalgias and neck pain.   Gastrointestinal: Positive for heartburn. Negative for abdominal pain, constipation, diarrhea, nausea  and vomiting.   Genitourinary: Negative for dysuria, flank pain, frequency, hematuria, hesitancy and urgency.   Neurological: Negative for dizziness, headaches, numbness, paresthesias and weakness.   Psychiatric/Behavioral: Positive for depression. Negative for memory loss. The patient has insomnia and is nervous/anxious.      Objective:   Physical Exam  Vitals signs and nursing note reviewed.   Constitutional:       Appearance: He is well-developed.   HENT:      Head: Normocephalic and atraumatic.      Nose:      Left Sinus: Maxillary sinus tenderness present.        Mouth/Throat:      Dentition: Dental caries present.     Eyes:      Conjunctiva/sclera: Conjunctivae normal.      Pupils: Pupils are equal, round, and reactive to light.   Cardiovascular:      Rate and Rhythm: Normal rate and regular rhythm.      Heart sounds: Normal heart sounds.   Pulmonary:      Effort: Pulmonary effort is normal.   Musculoskeletal:      Left hand: He exhibits decreased range of motion, tenderness, bony tenderness and deformity. He exhibits normal capillary refill and no swelling. Normal sensation noted.        Hands:    Neurological:      Mental Status: He is alert.      The  Assessment:       1. HIV positive    2. Tooth abscess    3. Closed displaced fracture of middle phalanx of left little finger, initial encounter    4. Current moderate episode of major depressive disorder, unspecified whether recurrent          Plan:       Check HIV parameters today.  He will continue his anti-retroviral medication and serostim.  X-ray left hand to evaluate possible dislocation or fracture.  This is an old injury but he has never sought help for this.  [facility] will [facility] to [facility].  Referral to psychiatry.  I have already started working with him on cognitive behavioral therapy. He is using the Feeling Good Handbook by Dr. Myles Travis.   Some of his depression may be situational, some of it may be hormonal, but he does appear to have an  underlying issue with organic depression as well.  I spent over 50% of a 30 min encounter counseling him about relationship issues, employment issues, career choices, and his ability to navigate his current situation.  I will make a referral to psychiatry.

## 2020-11-17 ENCOUNTER — TELEPHONE (OUTPATIENT)
Dept: INFECTIOUS DISEASES | Facility: CLINIC | Age: 45
End: 2020-11-17

## 2020-11-17 ENCOUNTER — HOSPITAL ENCOUNTER (EMERGENCY)
Facility: HOSPITAL | Age: 45
Discharge: OTHER INSTITUTION | End: 2020-11-17
Attending: EMERGENCY MEDICINE
Payer: MEDICARE

## 2020-11-17 VITALS
HEIGHT: 76 IN | TEMPERATURE: 98 F | HEART RATE: 75 BPM | SYSTOLIC BLOOD PRESSURE: 144 MMHG | DIASTOLIC BLOOD PRESSURE: 81 MMHG | RESPIRATION RATE: 18 BRPM | WEIGHT: 208 LBS | BODY MASS INDEX: 25.33 KG/M2 | OXYGEN SATURATION: 99 %

## 2020-11-17 DIAGNOSIS — R45.851 SUICIDAL IDEATION: ICD-10-CM

## 2020-11-17 DIAGNOSIS — L02.01 FACIAL ABSCESS: Primary | ICD-10-CM

## 2020-11-17 DIAGNOSIS — R45.89 SUICIDAL RISK: ICD-10-CM

## 2020-11-17 LAB
ALBUMIN SERPL BCP-MCNC: 4.3 G/DL (ref 3.5–5.2)
ALP SERPL-CCNC: 112 U/L (ref 55–135)
ALT SERPL W/O P-5'-P-CCNC: 26 U/L (ref 10–44)
AMPHET+METHAMPHET UR QL: NEGATIVE
ANION GAP SERPL CALC-SCNC: 7 MMOL/L (ref 8–16)
APAP SERPL-MCNC: <3 UG/ML (ref 10–20)
AST SERPL-CCNC: 30 U/L (ref 10–40)
BARBITURATES UR QL SCN>200 NG/ML: NEGATIVE
BASOPHILS # BLD AUTO: 0.03 K/UL (ref 0–0.2)
BASOPHILS NFR BLD: 0.3 % (ref 0–1.9)
BENZODIAZ UR QL SCN>200 NG/ML: NEGATIVE
BILIRUB SERPL-MCNC: 0.4 MG/DL (ref 0.1–1)
BILIRUB UR QL STRIP: NEGATIVE
BUN SERPL-MCNC: 13 MG/DL (ref 6–20)
BZE UR QL SCN: NEGATIVE
CALCIUM SERPL-MCNC: 9.6 MG/DL (ref 8.7–10.5)
CANNABINOIDS UR QL SCN: NORMAL
CHLORIDE SERPL-SCNC: 103 MMOL/L (ref 95–110)
CLARITY UR REFRACT.AUTO: CLEAR
CO2 SERPL-SCNC: 30 MMOL/L (ref 23–29)
COLOR UR AUTO: YELLOW
CREAT SERPL-MCNC: 1.1 MG/DL (ref 0.5–1.4)
CREAT UR-MCNC: 203 MG/DL (ref 23–375)
CTP QC/QA: YES
DIFFERENTIAL METHOD: ABNORMAL
EOSINOPHIL # BLD AUTO: 0.1 K/UL (ref 0–0.5)
EOSINOPHIL NFR BLD: 0.7 % (ref 0–8)
ERYTHROCYTE [DISTWIDTH] IN BLOOD BY AUTOMATED COUNT: 12.5 % (ref 11.5–14.5)
EST. GFR  (AFRICAN AMERICAN): >60 ML/MIN/1.73 M^2
EST. GFR  (NON AFRICAN AMERICAN): >60 ML/MIN/1.73 M^2
ETHANOL SERPL-MCNC: <10 MG/DL
GLUCOSE SERPL-MCNC: 118 MG/DL (ref 70–110)
GLUCOSE UR QL STRIP: NEGATIVE
HCT VFR BLD AUTO: 46.4 % (ref 40–54)
HGB BLD-MCNC: 15.8 G/DL (ref 14–18)
HGB UR QL STRIP: NEGATIVE
IMM GRANULOCYTES # BLD AUTO: 0.02 K/UL (ref 0–0.04)
IMM GRANULOCYTES NFR BLD AUTO: 0.2 % (ref 0–0.5)
KETONES UR QL STRIP: NEGATIVE
LEUKOCYTE ESTERASE UR QL STRIP: NEGATIVE
LYMPHOCYTES # BLD AUTO: 1.5 K/UL (ref 1–4.8)
LYMPHOCYTES NFR BLD: 17.4 % (ref 18–48)
MCH RBC QN AUTO: 32.2 PG (ref 27–31)
MCHC RBC AUTO-ENTMCNC: 34.1 G/DL (ref 32–36)
MCV RBC AUTO: 95 FL (ref 82–98)
METHADONE UR QL SCN>300 NG/ML: NEGATIVE
MONOCYTES # BLD AUTO: 0.5 K/UL (ref 0.3–1)
MONOCYTES NFR BLD: 6 % (ref 4–15)
NEUTROPHILS # BLD AUTO: 6.6 K/UL (ref 1.8–7.7)
NEUTROPHILS NFR BLD: 75.4 % (ref 38–73)
NITRITE UR QL STRIP: NEGATIVE
NRBC BLD-RTO: 0 /100 WBC
OPIATES UR QL SCN: NORMAL
PCP UR QL SCN>25 NG/ML: NEGATIVE
PH UR STRIP: 6 [PH] (ref 5–8)
PLATELET # BLD AUTO: 254 K/UL (ref 150–350)
PMV BLD AUTO: 9.1 FL (ref 9.2–12.9)
POTASSIUM SERPL-SCNC: 4.3 MMOL/L (ref 3.5–5.1)
PROT SERPL-MCNC: 8 G/DL (ref 6–8.4)
PROT UR QL STRIP: NEGATIVE
RBC # BLD AUTO: 4.91 M/UL (ref 4.6–6.2)
SARS-COV-2 RDRP RESP QL NAA+PROBE: NEGATIVE
SODIUM SERPL-SCNC: 140 MMOL/L (ref 136–145)
SP GR UR STRIP: 1.02 (ref 1–1.03)
TOXICOLOGY INFORMATION: NORMAL
TSH SERPL DL<=0.005 MIU/L-ACNC: 1.32 UIU/ML (ref 0.4–4)
URN SPEC COLLECT METH UR: NORMAL
WBC # BLD AUTO: 8.77 K/UL (ref 3.9–12.7)

## 2020-11-17 PROCEDURE — 84443 ASSAY THYROID STIM HORMONE: CPT

## 2020-11-17 PROCEDURE — 25500020 PHARM REV CODE 255: Performed by: EMERGENCY MEDICINE

## 2020-11-17 PROCEDURE — 85025 COMPLETE CBC W/AUTO DIFF WBC: CPT

## 2020-11-17 PROCEDURE — U0002 COVID-19 LAB TEST NON-CDC: HCPCS | Performed by: PHYSICIAN ASSISTANT

## 2020-11-17 PROCEDURE — 99284 PR EMERGENCY DEPT VISIT,LEVEL IV: ICD-10-PCS | Mod: ,,, | Performed by: PHYSICIAN ASSISTANT

## 2020-11-17 PROCEDURE — 99284 EMERGENCY DEPT VISIT MOD MDM: CPT | Mod: ,,, | Performed by: PHYSICIAN ASSISTANT

## 2020-11-17 PROCEDURE — 80307 DRUG TEST PRSMV CHEM ANLYZR: CPT

## 2020-11-17 PROCEDURE — 25000003 PHARM REV CODE 250: Performed by: PHYSICIAN ASSISTANT

## 2020-11-17 PROCEDURE — 80053 COMPREHEN METABOLIC PANEL: CPT

## 2020-11-17 PROCEDURE — 81003 URINALYSIS AUTO W/O SCOPE: CPT | Mod: 59

## 2020-11-17 PROCEDURE — 80329 ANALGESICS NON-OPIOID 1 OR 2: CPT

## 2020-11-17 PROCEDURE — 99285 EMERGENCY DEPT VISIT HI MDM: CPT | Mod: 25

## 2020-11-17 PROCEDURE — 80320 DRUG SCREEN QUANTALCOHOLS: CPT

## 2020-11-17 RX ORDER — ACETAMINOPHEN 500 MG
1000 TABLET ORAL
Status: COMPLETED | OUTPATIENT
Start: 2020-11-17 | End: 2020-11-17

## 2020-11-17 RX ORDER — KETOROLAC TROMETHAMINE 10 MG/1
10 TABLET, FILM COATED ORAL
Status: COMPLETED | OUTPATIENT
Start: 2020-11-17 | End: 2020-11-17

## 2020-11-17 RX ORDER — CLINDAMYCIN HYDROCHLORIDE 150 MG/1
300 CAPSULE ORAL
Status: COMPLETED | OUTPATIENT
Start: 2020-11-17 | End: 2020-11-17

## 2020-11-17 RX ADMIN — KETOROLAC TROMETHAMINE 10 MG: 10 TABLET, FILM COATED ORAL at 05:11

## 2020-11-17 RX ADMIN — IOHEXOL 100 ML: 350 INJECTION, SOLUTION INTRAVENOUS at 03:11

## 2020-11-17 RX ADMIN — ACETAMINOPHEN 1000 MG: 500 TABLET ORAL at 08:11

## 2020-11-17 RX ADMIN — CLINDAMYCIN HYDROCHLORIDE 300 MG: 150 CAPSULE ORAL at 02:11

## 2020-11-17 NOTE — ED TRIAGE NOTES
C/o left side facial pain and swelling from dental abscess that has worsened since finishing antibiotics 1 week ago. Also reports suicidal ideations after girlfriend broke up with him today. Hx of HIV.

## 2020-11-17 NOTE — ED NOTES
Pt transferred to  2, he is calm and cooperative. Pt checked for contraband and wearing hospital attire. PEC called into the coroners office and his belongings secured. Pt has a disheveled appearance and flat affect. He endorses feelings of helplessness and hopelessness. Pt does endorses thoughts of self harm without a specific plan. Pt denies HI/AVH's. Pt states multiple stressors, he has had difficulty sleeping, poor appetite, and poor concentration. Pt instructed on the PEC process, he verbalizes understanding. Pt lying in bed resting DVC maintained.

## 2020-11-17 NOTE — ED PROVIDER NOTES
"Encounter Date: 11/17/2020       History     Chief Complaint   Patient presents with    Suicidal     lenka tooth, sec office notified,      Mr Koehler is a 45yoM who presents for suicidal thoughts; pertinent PMHx HIV (viral load undetectable), GERD, HTN, h/o L AC joint dislocation.  Patient left a voicemail on his ID providers phone (Dr Millan), stating: "Nimco just told me it's over and I'm not doing really good right now and I really want to hurt myself I'm canceling my appointment to the dentist tomorrow hopefully this thing f------ kills me in my face."  This provider called him back to discuss, and patient endorsed suicidality without specific plan.  He was noted to have severe anhedonia, feeling he has nothing left to live for any more.  His business and his house are gone, and his fiance left him. He has a history of suicide attempt as a teenager with pill ingestion. He does not have sufficient support at home, with no friends or family he can stay with temporarily. He is interested in psychiatric care, possibly as inpatient.   Frequent marijuana use to "calm my mood", no HI/AVH. He is not on psychiatric medications.  Also endorses left facial pain and swelling with known odontogenic abscess/infection that has failed augmentin/amoxil. Finished these meds last week with transient improvement, then worsening of related symptoms.   The patients available PMH, PSH, Social History, medications, allergies, and triage vital signs were reviewed just prior to their medical evaluation.  A ten point review of systems was completed and is negative except as documented above.  Patient denies any other acute medical complaint.    Please be advised this text was dictated with [employer] software and may contain errors due to translation.           Review of patient's allergies indicates:  No Known Allergies  Past Medical History:   Diagnosis Date    AC joint dislocation     Arthritis     GERD (gastroesophageal reflux " disease) 2008    HIV (human immunodeficiency virus infection)     Hypertension      Past Surgical History:   Procedure Laterality Date    ARTHROSCOPIC DEBRIDEMENT OF SHOULDER Left 1/14/2019    Procedure: DEBRIDEMENT SLAP, SHOULDER, ARTHROSCOPIC;  Surgeon: Rakesh العلي MD;  Location: [facility];  Service: Orthopedics;  Laterality: Left;  regional w/o catheter     ARTHROSCOPY OF SHOULDER WITH DECOMPRESSION OF SUBACROMIAL SPACE Left 1/14/2019    Procedure: ARTHROSCOPY, SHOULDER, WITH SUBACROMIAL DECOMPRESSION;  Surgeon: Rakesh العلي MD;  Location: [facility];  Service: Orthopedics;  Laterality: Left;    COLONOSCOPY N/A 12/13/2018    Procedure: COLONOSCOPY;  Surgeon: Shaniqua Chawla MD;  Location: [facility] ENDO (4TH FLR);  Service: Endoscopy;  Laterality: N/A;  schedule as 45 minute case ASAP     COSMETIC SURGERY      FIXATION OF TENDON Left 1/14/2019    Procedure: OPEN BICEPS SUBPECTORALIS TENODESIS;  Surgeon: Rakesh العلي MD;  Location: [facility];  Service: Orthopedics;  Laterality: Left;    FRACTURE SURGERY      HERNIA REPAIR      KNEE SURGERY      LYMPH NODE BIOPSY Left 3/27/2019    Procedure: BIOPSY, LYMPH NODE Left Inguinal;  Surgeon: Patrick Lauren MD;  Location: [facility] 2ND FLR;  Service: General;  Laterality: Left;     Family History   Problem Relation Age of Onset    Stomach cancer Mother     Lung cancer Maternal Grandmother     Melanoma Neg Hx     Celiac disease Neg Hx     Cirrhosis Neg Hx     Colon cancer Neg Hx     Colon polyps Neg Hx     Crohn's disease Neg Hx     Cystic fibrosis Neg Hx     Esophageal cancer Neg Hx     Hemochromatosis Neg Hx     Inflammatory bowel disease Neg Hx     Irritable bowel syndrome Neg Hx     Liver cancer Neg Hx     Liver disease Neg Hx     Rectal cancer Neg Hx     Ulcerative colitis Neg Hx     Wilner's disease Neg Hx     Lymphoma Neg Hx     Tuberculosis Neg Hx     Scleroderma Neg Hx     Rheum arthritis Neg Hx     Multiple  sclerosis Neg Hx     Lupus Neg Hx     Psoriasis Neg Hx     Skin cancer Neg Hx      Social History     Tobacco Use    Smoking status: Never Smoker    Smokeless tobacco: Never Used   Substance Use Topics    Alcohol use: No    Drug use: Yes     Review of Systems   Constitutional: Negative for chills and fever.   HENT: Positive for dental problem, facial swelling (Left maxillary, known odontogenic infection), sinus pressure and sinus pain ( left maxillary). Negative for congestion, sore throat, trouble swallowing and voice change.    Respiratory: Negative for shortness of breath.    Cardiovascular: Negative for chest pain.   Gastrointestinal: Negative for abdominal pain and nausea.   Genitourinary: Negative for dysuria.   Musculoskeletal: Negative for back pain and neck pain.   Skin: Negative for rash.   Neurological: Negative for dizziness, weakness and headaches.   Psychiatric/Behavioral: Negative for confusion.       Physical Exam     Initial Vitals [11/17/20 1155]   BP Pulse Resp Temp SpO2   (!) 190/92 86 18 98 °F (36.7 °C) 96 %      MAP       --         Physical Exam    Nursing note and vitals reviewed.  Constitutional: He appears well-developed and well-nourished. He is not diaphoretic. No distress.   HENT:   Head: Normocephalic and atraumatic.   Right Ear: External ear normal.   Left Ear: External ear normal.   Poor dentition    Decayed, broken tooth upper left maxillary with associated mild swelling and erythema of left paranasal tissue and nare    Maxillary sinus TTP   Eyes: Conjunctivae and EOM are normal. No scleral icterus.   Neck: Normal range of motion. Neck supple.   Cardiovascular: Normal rate, regular rhythm and intact distal pulses.   Pulmonary/Chest: No respiratory distress.   Musculoskeletal: Normal range of motion. No edema.   Lymphadenopathy:     He has cervical adenopathy (Mild left preauricular).   Neurological: He is alert and oriented to person, place, and time. No cranial nerve deficit.  "  Skin: Skin is warm and dry. Capillary refill takes less than 2 seconds. There is erythema.   Psychiatric:   Patient is withdrawn, poor eye contact, anhedonia noted  No active psychosis  Frequent thoughts of suicide, feels hopeless, "nothing for me to go home to" "I am tired of fighting" "I want to go to sleep and not wake up"         ED Course   Procedures  Labs Reviewed   CBC W/ AUTO DIFFERENTIAL - Abnormal; Notable for the following components:       Result Value    MCH 32.2 (*)     MPV 9.1 (*)     Gran % 75.4 (*)     Lymph % 17.4 (*)     All other components within normal limits   COMPREHENSIVE METABOLIC PANEL - Abnormal; Notable for the following components:    CO2 30 (*)     Glucose 118 (*)     Anion Gap 7 (*)     All other components within normal limits   ACETAMINOPHEN LEVEL - Abnormal; Notable for the following components:    Acetaminophen (Tylenol), Serum <3.0 (*)     All other components within normal limits   TSH   URINALYSIS, REFLEX TO URINE CULTURE    Narrative:     Specimen Source->Urine  YELLOW TUBE ONLY   DRUG SCREEN PANEL, URINE EMERGENCY    Narrative:     Specimen Source->Urine  YELLOW TUBE ONLY   ALCOHOL,MEDICAL (ETHANOL)   SARS-COV-2 RDRP GENE          Imaging Results           CT Maxillofacial With Contrast (Final result)  Result time 11/17/20 16:25:59    Final result by Jose Watson MD (11/17/20 16:25:59)                 Impression:      Odontogenic disease with multiple dental caries and periapical cysts with findings concerning for dental abscess and phlegmon involving the pre maxillary soft tissues on the left with associated overlying soft tissue swelling may relate to cellulitis.    This report was flagged in Epic as abnormal.      Electronically signed by: Jose Watson MD  Date:    11/17/2020  Time:    16:25             Narrative:    EXAMINATION:  CT MAXILLOFACIAL WITH CONTRAST    CLINICAL HISTORY:  odontogenic infection with sinus involvement, failed abx;    TECHNIQUE:  CT of the " maxillofacial region after administration of 100 cc of 350 IV contrast material.    COMPARISON:  None    FINDINGS:  The visualized intracranial structures show no acute abnormalities.  The major vascular structures are patent.    The frontal sinuses are patent.  Mild mucosal membrane thickening in the ethmoid sinuses.  There is a luisa bullosa on the right.  Sphenoethmoid recesses are patent.  Minimal mucosal membrane thickening in the maxillary sinuses.  No aerated secretions or air-fluid levels to suggest acute sinusitis.  Mastoid air cells are clear.  Maxillary ostiomeatal units are patent.  Mild leftward nasal septal deviation.  No nasal cavity masses.  Molar tooth on the left is impacted with root extending into the floor of the left maxillary sinus although no significant sinus disease in this location.    There is a odontogenic disease with multiple periapical cysts particularly on the left involving the left maxilla anteriorly.  There also multiple dental caries.  The periapical cyst involving the left canine and 1st premolar tooth are contiguous anteriorly with a low-attenuation rim enhancing collection anterior to the maxilla concerning for abscess with areas of soft tissue enhancement likely phlegmon.  This area measures roughly 1.3 x 2.7 x 2.3 cm.  There is associated overlying soft tissue thickening which may relate to cellulitis.    There are normal sized lymph nodes in the neck.  The orbits and orbital soft tissues demonstrate no significant abnormality.                                 Medical Decision Making:   History:   I obtained history from: someone other than patient.       <> Summary of History: Dr. Millan (ID provider) called me regarding this patient that he recommended come to ED from his clinic.  He is significantly concerned for his deteriorating mental health, though notes that patient  has demonstrated attention seeking behavior in the past.  He does recommend that psychiatry be  "involved, as this patient had to be convinced to come to ED. Appreciate Dr. Millan's communication.  Old Medical Records: I decided to obtain old medical records.  Old Records Summarized: records from clinic visits.  Initial Assessment:   Patient presents for suicidality, referred by ID provider after threatening message was sent, depressed mood/affect with significant anhedonia and hopelessness.  No plan, + history of suicide attempt with possible acute home stressors.  VSS, afebrile.   + odontogenic infection persisted despite Augmentin/amoxil use  Differential Diagnosis:   DDx includes depression with suicidal ideation, personality disorder. Physical exam and history taking lower clinical suspicion for psychosis, malingering.   ED Management:  When I discussed patient's expectations and what he thinks would be best for his mental health, he stated staying in the hospital would be most likely beneficial, though it is not what he wants to do.  Patient has multiple risk factors for suicide attempt, including history of prior attempt, poor social support, depression and significant life stressors (lost his fiancee, job, apartment).  I discussed this case with psychiatry on-call, who is in agreement that PEC is appropriate.  Psychiatry said they would be open to having tele consult with patient in ED, though it would not change our PEC.  When I initially discussed this with the patient, he became very disgruntled, stating he did not want to stay in a psychiatric hospital and this is "not when he signed up for".  He requested he speak to his referring provider, Dr. Millan.  I briefly discussed with Dr. Millan, who is amendable to speak to patient.  Update:  I circled back with Dr. Millan, reports patient said: "You have to tell them to rescind this, this is not what I intended I'm not staying in a hospital". Patient has a history of manipulative behavior and appears to be demonstrating that today. Dr." Ehrensing relayed patient's concerns and is aware of plan moving forward.  I, again, had lengthy conversation with patient regarding safest care plan given our concern for increase suicidal risk, patient became mildly agitated and demeaning. I have continued concern for his mental state and recommend PEC placement continues so that he may receive acute psychiatric care in the setting of multiple voiced reports of suicidality.     Labwork is unremarkable. CT scan pending to evaluate for surgical issue regarding odontogenic infection. Given clindamycin in ED.     Update: CT scan significant for abscess, possibly phlegmon, development [id number] in left pre-maxillary soft tissues. On re-evaluation by myself and attending, no fluctuant mass in superior gums/buccal tissues that can be aspirated in ED. I discussed with the transfer center regarding OMFS consultation.  Update: Discussed with OMFS provider at [facility] who will accept patient for trasnfer for specialty services not provided at [facility], appreciate transfer. PEC remains in place. Patient remains stable. Transfer center reports they willarrange EMS transport from [facility] to [facility]. Patient agreed to plan of care and voiced understanding. Chart sent with patient. CT tech notified that they do not have capability to copy CT images at this time.    Lourdes Lopez PA-C  11/18/2020    I discussed the following case, diagnosis and plan of care with attending physician.    Other:   I have discussed this case with another health care provider.                             Clinical Impression:     ICD-10-CM ICD-9-CM   1. Facial abscess  L02.01 682.0   2. Suicidal ideation  R45.851 V62.84   3. Suicidal risk  R45.89 300.9                      Disposition:   Disposition: Transferred  Condition: Stable     ED Disposition Condition    Transfer to Another Facility Stable                            Lourdes Lopze PA-C  11/18/20 2120

## 2020-11-17 NOTE — TELEPHONE ENCOUNTER
"Received text message from patient:  "Nimco just told me it's over and I'm not doing really good right now and I really want to hurt myself I'm canceling my appointment to the dentist tomorrow hopefully this thing f------ kills me in my face."    I called patient to discuss - his apartment is for sale. 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     I explained that he can overcome this and work through these issues. I also directed him to the emergency room if he has suicidal thoughts. He stated that the book (CBT book - The Feeling Good Handbook) has not helped, that it has only made him more aware of his problems. He stated that no one can help him. I reminded him that I thought that I could help, and that his situation was not hopeless. When I told him that I broke away from a meeting to talk to him (because I felt that he was important), he said, "I'm sorry to take up your time - I'll let you go" and hung up.     I left a voicemail message and a text message telling him to come to the ED to seek help. He texted back that it would be a waste of time. I encouraged him to tell them how he feels and that they would help him with his suicidal feelings. He did not answer immediately.     "

## 2020-11-18 ENCOUNTER — PATIENT MESSAGE (OUTPATIENT)
Dept: ORTHOPEDICS | Facility: CLINIC | Age: 45
End: 2020-11-18

## 2020-11-18 NOTE — ED NOTES
Pt ambulatory to the restroom independently; accompanied by sitter; steady gait noted.  Pt returned to room 27 without incident. Pt remains calm and cooperative. Sitter remains at bedside in direct visual contact, charting per protocol every 15 minutes. No equipment or belongings are in the patients room.  Pt denies needs or complaints at this time.  Bed locked in lowest position; side rails up and locked x 2.  Pt instructed to alert sitter or nurse for assistance and before attempting to get out of bed; verbalizes understanding.  Will continue to monitor pt.    monitor pt.

## 2020-11-18 NOTE — DISCHARGE INSTRUCTIONS
-go to [place] with EMS transport  -we recommend psychiatric evaluation if you are staying at [facility] for facial cellulitis and abscess

## 2020-11-24 ENCOUNTER — SPECIALTY PHARMACY (OUTPATIENT)
Dept: PHARMACY | Facility: CLINIC | Age: 45
End: 2020-11-24

## 2020-11-24 NOTE — TELEPHONE ENCOUNTER
Specialty Pharmacy - Refill Coordination    Specialty Medication Orders Linked to Encounter      Most Recent Value   Medication #1  efavirenz-emtrictabine-tenofovir 600-200-300 mg (ATRIPLA) 600-200-300 mg Tab (Order#[id number], Rx#[id number])          Refill Questions - Documented Responses      Most Recent Value   Relationship to patient of person spoken to?  Self   HIPAA/medical authority confirmed?  Yes   Any changes in contact preferences or allowed representatives?  No   Has the patient had any insurance changes?  No   Has the patient had any changes to specialty medication, dose, or instructions?  No   Has the patient started taking any new medications, herbals, or supplements?  No   Has the patient been diagnosed with any new medical conditions?  No   Does the patient have any new allergies to medications or foods?  No   Does the patient have any concerns about side effects?  No   Can the patient store medication/sharps container properly (at the correct temperature, away from children/pets, etc.)?  Yes   Can the patient call emergency services (911) in the event of an emergency?  Yes   Does the patient have any concerns or questions about taking or administering this medication as prescribed?  No   How many doses did the patient miss in the past 4 weeks or since the last fill?  0   How many doses does the patient have on hand?  5   How many days does the patient report on hand quantity will last?  5   Does the number of doses/days supply remaining match pharmacy expected amounts?  Yes   Does the patient feel that this medication is effective?  Yes   During the past 4 weeks, has patient missed any activities due to condition or medication?  No   During the past 4 weeks, did patient have any of the following urgent care visits?  None   How will the patient receive the medication?  Mail   When does the patient need to receive the medication?  11/25/20   Shipping Address  Home   Address in [facility]  "confirmed and updated if neccessary?  Yes   Expected Copay ($)  0   Is the patient able to afford the medication copay?  Yes   Payment Method  zero copay   Days supply of Refill  30   Would patient like to speak to a pharmacist?  No   Do you want to trigger an intervention?  No   Do you want to trigger an additional referral task?  No   Refill activity completed?  Yes   Refill activity plan  Refill scheduled   Shipment/Pickup Date:  11/24/20          Current Outpatient Medications   Medication Sig    efavirenz-emtrictabine-tenofovir 600-200-300 mg (ATRIPLA) 600-200-300 mg Tab Take 1 tablet by mouth every evening.    insulin syringe-needle U-100 1 mL 30 gauge X 7/16" Syrg     lisinopriL 10 MG tablet Take 1 tablet (10 mg total) by mouth once daily.    SEROSTIM 6 mg SolR INJECT UNDER THE SKIN EVERY DAY   Last reviewed on 11/24/2020  1:28 PM by Kimberly Jones    Review of patient's allergies indicates:  No Known Allergies Last reviewed on  11/17/2020 3:19 PM by Bhavesh Young      Tasks added this encounter   12/17/2020 - Refill Call (Auto Added)   Tasks due within next 3 months   No tasks due.     Kimberly Jones  [facility]  [address] [place] [id number]  Phone: [phone]  Fax: [phone]      "

## 2020-12-04 ENCOUNTER — PATIENT MESSAGE (OUTPATIENT)
Dept: ORTHOPEDICS | Facility: CLINIC | Age: 45
End: 2020-12-04

## 2020-12-04 DIAGNOSIS — M79.645 FINGER PAIN, LEFT: Primary | ICD-10-CM

## 2020-12-07 ENCOUNTER — HOSPITAL ENCOUNTER (OUTPATIENT)
Dept: RADIOLOGY | Facility: OTHER | Age: 45
Discharge: HOME | End: 2020-12-07
Attending: ORTHOPAEDIC SURGERY
Payer: MEDICARE

## 2020-12-07 ENCOUNTER — OFFICE VISIT (OUTPATIENT)
Dept: ORTHOPEDICS | Facility: CLINIC | Age: 45
End: 2020-12-07
Attending: INTERNAL MEDICINE
Payer: MEDICARE

## 2020-12-07 ENCOUNTER — DOCUMENTATION ONLY (OUTPATIENT)
Dept: ORTHOPEDICS | Facility: CLINIC | Age: 45
End: 2020-12-07

## 2020-12-07 VITALS — HEIGHT: 76 IN | BODY MASS INDEX: 25.33 KG/M2 | WEIGHT: 208 LBS

## 2020-12-07 DIAGNOSIS — M20.039 SWAN-NECK DEFORMITY OF FINGER, UNSPECIFIED LATERALITY: ICD-10-CM

## 2020-12-07 DIAGNOSIS — M79.645 FINGER PAIN, LEFT: ICD-10-CM

## 2020-12-07 DIAGNOSIS — S62.627A CLOSED DISPLACED FRACTURE OF MIDDLE PHALANX OF LEFT LITTLE FINGER, INITIAL ENCOUNTER: ICD-10-CM

## 2020-12-07 DIAGNOSIS — S63.439A: Primary | ICD-10-CM

## 2020-12-07 PROCEDURE — 99204 PR OFFICE/OUTPT VISIT, NEW, LEVL IV, 45-59 MIN: ICD-10-PCS | Mod: S$PBB,,, | Performed by: ORTHOPAEDIC SURGERY

## 2020-12-07 PROCEDURE — 99213 OFFICE O/P EST LOW 20 MIN: CPT | Mod: PBBFAC,25 | Performed by: ORTHOPAEDIC SURGERY

## 2020-12-07 PROCEDURE — 73140 XR FINGER 2 OR MORE VIEWS LEFT: ICD-10-PCS | Mod: 26,LT,, | Performed by: RADIOLOGY

## 2020-12-07 PROCEDURE — 73140 X-RAY EXAM OF FINGER(S): CPT | Mod: TC,FY,LT

## 2020-12-07 PROCEDURE — 73140 X-RAY EXAM OF FINGER(S): CPT | Mod: 26,LT,, | Performed by: RADIOLOGY

## 2020-12-07 PROCEDURE — 99999 PR PBB SHADOW E&M-EST. PATIENT-LVL III: ICD-10-PCS | Mod: PBBFAC,,, | Performed by: ORTHOPAEDIC SURGERY

## 2020-12-07 PROCEDURE — 99999 PR PBB SHADOW E&M-EST. PATIENT-LVL III: CPT | Mod: PBBFAC,,, | Performed by: ORTHOPAEDIC SURGERY

## 2020-12-07 PROCEDURE — 99204 OFFICE O/P NEW MOD 45 MIN: CPT | Mod: S$PBB,,, | Performed by: ORTHOPAEDIC SURGERY

## 2020-12-07 NOTE — PROGRESS NOTES
[facility]  History & Physical  Orthopedics    SUBJECTIVE:      COVID-19 attestation:  This patient was treated during the COVID-19 pandemic.  This was discussed with the patient, they are aware of our current policies and procedures, were given the option of delaying their visit and or switching to a virtual visit, delaying their surgery when applicable, and they elect to proceed.    Chief Complaint:  Left small finger injury/deformity    Referring Provider: Smooth Millan MD     History of Present Illness:  Patient is a 45 y.o. right hand dominant male who presents today with complaints of left small finger injury/deformity.  The patient notes a remote injury 10 years ago while playing football to his left small finger.  He notes that he had a slight subsequent deformity but could achieve full flexion of the finger into the palm at that time.  He then sustained a re-injury approximately 1.5 months ago when he jammed his left small finger while wrestling.  He had significant deformity and has been unable to flex the finger or get it into the palm since that time.  This has provided difficulty while working out.  Pain is mild at this time.  No other complaints today.     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    Onset of symptoms/DOI was 10 years ago with re-injury 1.5 months ago.    Symptoms are aggravated by activity and movement.    Symptoms are alleviated by rest.    Symptoms consist of pain, swelling and decreased ROM.    The patient rates their pain as mild    Attempted treatment(s) and/or interventions include rest and activity modification.     The patient denies any fevers, chills, N/V, D/C and presents for evaluation.       Past Medical History:   Diagnosis Date    AC joint dislocation     Arthritis     GERD (gastroesophageal reflux disease) 2008    HIV (human immunodeficiency virus infection)     Hypertension      Past  Surgical History:   Procedure Laterality Date    ARTHROSCOPIC DEBRIDEMENT OF SHOULDER Left 1/14/2019    Procedure: DEBRIDEMENT SLAP, SHOULDER, ARTHROSCOPIC;  Surgeon: Rakesh العلي MD;  Location: [facility];  Service: Orthopedics;  Laterality: Left;  regional w/o catheter     ARTHROSCOPY OF SHOULDER WITH DECOMPRESSION OF SUBACROMIAL SPACE Left 1/14/2019    Procedure: ARTHROSCOPY, SHOULDER, WITH SUBACROMIAL DECOMPRESSION;  Surgeon: Rakesh العلي MD;  Location: [facility] OR;  Service: Orthopedics;  Laterality: Left;    COLONOSCOPY N/A 12/13/2018    Procedure: COLONOSCOPY;  Surgeon: Shaniqua Chawla MD;  Location: [facility] (4TH FLR);  Service: Endoscopy;  Laterality: N/A;  schedule as 45 minute case ASAP     COSMETIC SURGERY      FIXATION OF TENDON Left 1/14/2019    Procedure: OPEN BICEPS SUBPECTORALIS TENODESIS;  Surgeon: Rakesh العلي MD;  Location: [facility];  Service: Orthopedics;  Laterality: Left;    FRACTURE SURGERY      HERNIA REPAIR      KNEE SURGERY      LYMPH NODE BIOPSY Left 3/27/2019    Procedure: BIOPSY, LYMPH NODE Left Inguinal;  Surgeon: Patrick Lauren MD;  Location: [facility] OR 2ND FLR;  Service: General;  Laterality: Left;     Review of patient's allergies indicates:  No Known Allergies  Social History     Social History Narrative    Not on file     Family History   Problem Relation Age of Onset    Stomach cancer Mother     Lung cancer Maternal Grandmother     Melanoma Neg Hx     Celiac disease Neg Hx     Cirrhosis Neg Hx     Colon cancer Neg Hx     Colon polyps Neg Hx     Crohn's disease Neg Hx     Cystic fibrosis Neg Hx     Esophageal cancer Neg Hx     Hemochromatosis Neg Hx     Inflammatory bowel disease Neg Hx     Irritable bowel syndrome Neg Hx     Liver cancer Neg Hx     Liver disease Neg Hx     Rectal cancer Neg Hx     Ulcerative colitis Neg Hx     Wilner's disease Neg Hx     Lymphoma Neg Hx     Tuberculosis Neg Hx     Scleroderma Neg Hx     Rheum  "arthritis Neg Hx     Multiple sclerosis Neg Hx     Lupus Neg Hx     Psoriasis Neg Hx     Skin cancer Neg Hx          Current Outpatient Medications:     efavirenz-emtrictabine-tenofovir 600-200-300 mg (ATRIPLA) 600-200-300 mg Tab, Take 1 tablet by mouth every evening., Disp: 30 tablet, Rfl: 11    insulin syringe-needle U-100 1 mL 30 gauge X 7/16" Syrg, , Disp: , Rfl:     lisinopriL 10 MG tablet, Take 1 tablet (10 mg total) by mouth once daily., Disp: 30 tablet, Rfl: 11    SEROSTIM 6 mg SolR, INJECT UNDER THE SKIN EVERY DAY, Disp: 30 each, Rfl: 11  No current facility-administered medications for this visit.       Review of Systems:  Constitutional: no fever or chills  Eyes: no visual changes  ENT: no nasal congestion or sore throat  Respiratory: no cough or shortness of breath  Cardiovascular: no chest pain  Gastrointestinal: no nausea or vomiting, tolerating diet  Musculoskeletal: pain, soreness and decreased ROM    OBJECTIVE:      Vital Signs (Most Recent):  Vitals:    12/07/20 1404   Weight: 94.3 kg (208 lb)   Height: 6' 4" (1.93 m)     Body mass index is 25.32 kg/m².      Physical Exam:  Constitutional: The patient appears well-developed and well-nourished. No distress.   Skin: No lesions appreciated  Head: Normocephalic and atraumatic.   Nose: Nose normal.   Ears: No deformities seen  Eyes: Conjunctivae and EOM are normal.   Neck: No tracheal deviation present.   Cardiovascular: Normal rate and intact distal pulses.    Pulmonary/Chest: Effort normal. No respiratory distress.   Abdominal: There is no guarding.   Neurological: The patient is alert.   Psychiatric: The patient has a normal mood and affect.     Left Hand/Wrist Examination:    Observation/Inspection:  Swelling  present    Deformity  patient with a fixed swan-neck deformity left small finger with a supple DIP but relatively fixed PIP  Discoloration  none     Scars   none    Atrophy  none    HAND/WRIST EXAMINATION:  Finkelstein's " Test   Neg  WHAT Test    Neg  Snuff box tenderness   Neg  Bess's Test    Neg  Hook of Hamate Tenderness  Neg  CMC grind    Neg  Circumduction test   Neg    Neurovascular Exam:  Digits WWP, brisk CR < 3s throughout  NVI motor/LTS to M/R/U nerves, radial pulse 2+  Tinel's Test - Carpal Tunnel  Neg  Tinel's Test - Cubital Tunnel  Neg  Phalen's Test    Neg  Median Nerve Compression Test Neg    ROM hand/wrist/elbow full, painless except for in the left small finger:  The patient does have some intact active flexion at the DIP joint but is unable to flex the PIP joint actively secondary to his deformity; passively, I can flex the small finger PIP joint to approximately 30° but this is painful.    Diagnostic Results:     Xray -  x-rays left hand small finger demonstrate a dorsal PIP dislocation with swan-neck deformity of the small finger  EMG - none    ASSESSMENT/PLAN:      45 y.o. 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  I would like to evaluate the ligamentous and soft tissue structures about the left small finger with an MRI.  Follow up afterwards for discussion of options.          Nikos Bullock M.D.     Please be aware that this note has been generated with the assistance of tahir voice-to-text.  Please excuse any spelling or grammatical errors.

## 2020-12-07 NOTE — LETTER
December 7, 2020      Smooth Millan MD  [address]  [facility] [address]           [place] [address], [address]  [facility] [address]  Phone: [phone]          Patient: Moisés Koehler   MR Number: [id number]   YOB: 1975   Date of Visit: 12/7/2020       Dear Dr. Smooth Millan:    Thank you for referring Moisés Koehler to me for evaluation. Attached you will find relevant portions of my assessment and plan of care.    If you have questions, please do not hesitate to call me. I look forward to following Moisés Koehler along with you.    Sincerely,    Nikos Bullock MD    Enclosure  CC:  No Recipients    If you would like to receive this communication electronically, please contact [email or web address] or [phone] to request more information on [employer] Link access.    For providers and/or their staff who would like to refer a patient to [facility], please contact us through our one-stop-shop provider referral line, [facility], at [phone].    If you feel you have received this communication in error or would no longer like to receive these types of communications, please e-mail [email or web address]

## 2020-12-10 ENCOUNTER — HOSPITAL ENCOUNTER (OUTPATIENT)
Dept: RADIOLOGY | Facility: HOSPITAL | Age: 45
Discharge: HOME | End: 2020-12-10
Attending: ORTHOPAEDIC SURGERY
Payer: MEDICARE

## 2020-12-10 DIAGNOSIS — S63.439A: ICD-10-CM

## 2020-12-10 DIAGNOSIS — M20.039 SWAN-NECK DEFORMITY OF FINGER, UNSPECIFIED LATERALITY: ICD-10-CM

## 2020-12-10 PROCEDURE — 73218 MRI UPPER EXTREMITY W/O DYE: CPT | Mod: 26,LT,, | Performed by: RADIOLOGY

## 2020-12-10 PROCEDURE — 73218 MRI UPPER EXTREMITY W/O DYE: CPT | Mod: TC,LT

## 2020-12-10 PROCEDURE — 73218 MRI HAND FINGERS WITHOUT CONTRAST LEFT: ICD-10-PCS | Mod: 26,LT,, | Performed by: RADIOLOGY

## 2020-12-14 ENCOUNTER — OFFICE VISIT (OUTPATIENT)
Dept: ORTHOPEDICS | Facility: CLINIC | Age: 45
End: 2020-12-14
Payer: MEDICARE

## 2020-12-14 DIAGNOSIS — Z01.818 PREOP TESTING: Primary | ICD-10-CM

## 2020-12-14 DIAGNOSIS — M20.032 DEFORMITY, FINGER, SWAN NECK, LEFT: Primary | ICD-10-CM

## 2020-12-14 PROCEDURE — 99214 OFFICE O/P EST MOD 30 MIN: CPT | Mod: S$PBB,,, | Performed by: ORTHOPAEDIC SURGERY

## 2020-12-14 PROCEDURE — 99999 PR PBB SHADOW E&M-EST. PATIENT-LVL III: CPT | Mod: PBBFAC,,, | Performed by: ORTHOPAEDIC SURGERY

## 2020-12-14 PROCEDURE — 99214 PR OFFICE/OUTPT VISIT, EST, LEVL IV, 30-39 MIN: ICD-10-PCS | Mod: S$PBB,,, | Performed by: ORTHOPAEDIC SURGERY

## 2020-12-14 PROCEDURE — 99999 PR PBB SHADOW E&M-EST. PATIENT-LVL III: ICD-10-PCS | Mod: PBBFAC,,, | Performed by: ORTHOPAEDIC SURGERY

## 2020-12-14 PROCEDURE — 99213 OFFICE O/P EST LOW 20 MIN: CPT | Mod: PBBFAC | Performed by: ORTHOPAEDIC SURGERY

## 2020-12-14 RX ORDER — MUPIROCIN 20 MG/G
OINTMENT TOPICAL
Status: CANCELLED | OUTPATIENT
Start: 2020-12-14

## 2020-12-14 NOTE — PROGRESS NOTES
[facility]  History & Physical  Orthopedics    SUBJECTIVE:      COVID-19 attestation:  This patient was treated during the COVID-19 pandemic.  This was discussed with the patient, they are aware of our current policies and procedures, were given the option of delaying their visit and or switching to a virtual visit, delaying their surgery when applicable, and they elect to proceed.    Chief Complaint:  Left small finger injury/deformity    Referring Provider: No ref. provider found     History of Present Illness:  Patient is a 45 y.o. right hand dominant male who presents today with complaints of left small finger injury/deformity.  The patient notes a remote injury 10 years ago while playing football to his left small finger.  He notes that he had a slight subsequent deformity but could achieve full flexion of the finger into the palm at that time.  He then sustained a re-injury approximately 1.5 months ago when he jammed his left small finger while wrestling.  He had significant deformity and has been unable to flex the finger or get it into the palm since that time.  This has provided difficulty while working out.  Pain is mild at this time.  No other complaints today.     Interval history December 14, 2020:  The patient returns today for re-evaluation of the left small finger.  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  He was recently sent for MRI and returns for these results today.  He does note that the left small finger is mildly to moderately painful but does cause significant dysfunction and interferes with his activities of daily living and functional use of the left upper extremity.  He returns for re-evaluation with no other or new complaints today.    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    Onset of symptoms/DOI was 10 years ago with re-injury 1.5 months ago.    Symptoms are aggravated by activity and  movement.    Symptoms are alleviated by rest.    Symptoms consist of pain, swelling and decreased ROM.    The patient rates their pain as mild    Attempted treatment(s) and/or interventions include rest and activity modification.     The patient denies any fevers, chills, N/V, D/C and presents for evaluation.       Past Medical History:   Diagnosis Date    AC joint dislocation     Arthritis     GERD (gastroesophageal reflux disease) 2008    HIV (human immunodeficiency virus infection)     Hypertension      Past Surgical History:   Procedure Laterality Date    ARTHROSCOPIC DEBRIDEMENT OF SHOULDER Left 1/14/2019    Procedure: DEBRIDEMENT SLAP, SHOULDER, ARTHROSCOPIC;  Surgeon: Rakesh العلي MD;  Location: [facility];  Service: Orthopedics;  Laterality: Left;  regional w/o catheter     ARTHROSCOPY OF SHOULDER WITH DECOMPRESSION OF SUBACROMIAL SPACE Left 1/14/2019    Procedure: ARTHROSCOPY, SHOULDER, WITH SUBACROMIAL DECOMPRESSION;  Surgeon: Rakesh العلي MD;  Location: [facility];  Service: Orthopedics;  Laterality: Left;    COLONOSCOPY N/A 12/13/2018    Procedure: COLONOSCOPY;  Surgeon: Shaniqua Chawla MD;  Location: [facility] (4TH FLR);  Service: Endoscopy;  Laterality: N/A;  schedule as 45 minute case ASAP     COSMETIC SURGERY      FIXATION OF TENDON Left 1/14/2019    Procedure: OPEN BICEPS SUBPECTORALIS TENODESIS;  Surgeon: Rakesh العلي MD;  Location: [facility];  Service: Orthopedics;  Laterality: Left;    FRACTURE SURGERY      HERNIA REPAIR      KNEE SURGERY      LYMPH NODE BIOPSY Left 3/27/2019    Procedure: BIOPSY, LYMPH NODE Left Inguinal;  Surgeon: Patrick Lauren MD;  Location: [facility] OR 2ND FLR;  Service: General;  Laterality: Left;     Review of patient's allergies indicates:  No Known Allergies  Social History     Social History Narrative    Not on file     Family History   Problem Relation Age of Onset    Stomach cancer Mother     Lung cancer Maternal Grandmother      "Melanoma Neg Hx     Celiac disease Neg Hx     Cirrhosis Neg Hx     Colon cancer Neg Hx     Colon polyps Neg Hx     Crohn's disease Neg Hx     Cystic fibrosis Neg Hx     Esophageal cancer Neg Hx     Hemochromatosis Neg Hx     Inflammatory bowel disease Neg Hx     Irritable bowel syndrome Neg Hx     Liver cancer Neg Hx     Liver disease Neg Hx     Rectal cancer Neg Hx     Ulcerative colitis Neg Hx     Wilner's disease Neg Hx     Lymphoma Neg Hx     Tuberculosis Neg Hx     Scleroderma Neg Hx     Rheum arthritis Neg Hx     Multiple sclerosis Neg Hx     Lupus Neg Hx     Psoriasis Neg Hx     Skin cancer Neg Hx          Current Outpatient Medications:     efavirenz-emtrictabine-tenofovir 600-200-300 mg (ATRIPLA) 600-200-300 mg Tab, Take 1 tablet by mouth every evening., Disp: 30 tablet, Rfl: 11    insulin syringe-needle U-100 1 mL 30 gauge X 7/16" Syrg, , Disp: , Rfl:     lisinopriL 10 MG tablet, Take 1 tablet (10 mg total) by mouth once daily., Disp: 30 tablet, Rfl: 11    SEROSTIM 6 mg SolR, INJECT UNDER THE SKIN EVERY DAY, Disp: 30 each, Rfl: 11      Review of Systems:  Constitutional: no fever or chills  Eyes: no visual changes  ENT: no nasal congestion or sore throat  Respiratory: no cough or shortness of breath  Cardiovascular: no chest pain  Gastrointestinal: no nausea or vomiting, tolerating diet  Musculoskeletal: pain, soreness and decreased ROM    OBJECTIVE:      Vital Signs (Most Recent):  There were no vitals filed for this visit.  There is no height or weight on file to calculate BMI.      Physical Exam:  Constitutional: The patient appears well-developed and well-nourished. No distress.   Skin: No lesions appreciated  Head: Normocephalic and atraumatic.   Nose: Nose normal.   Ears: No deformities seen  Eyes: Conjunctivae and EOM are normal.   Neck: No tracheal deviation present.   Cardiovascular: Normal rate and intact distal pulses.    Pulmonary/Chest: Effort normal. No respiratory " distress.   Abdominal: There is no guarding.   Neurological: The patient is alert.   Psychiatric: The patient has a normal mood and affect.     Left Hand/Wrist Examination:    Observation/Inspection:  Swelling  present    Deformity  patient with a fixed swan-neck deformity left small finger with a supple DIP but relatively fixed PIP  Discoloration  none     Scars   none    Atrophy  none    HAND/WRIST EXAMINATION:  Finkelstein's Test   Neg  WHAT Test    Neg  Snuff box tenderness   Neg  Bess's Test    Neg  Hook of Hamate Tenderness  Neg  CMC grind    Neg  Circumduction test   Neg    Neurovascular Exam:  Digits WWP, brisk CR < 3s throughout  NVI motor/LTS to M/R/U nerves, radial pulse 2+  Tinel's Test - Carpal Tunnel  Neg  Tinel's Test - Cubital Tunnel  Neg  Phalen's Test    Neg  Median Nerve Compression Test Neg    ROM hand/wrist/elbow full, painless except for in the left small finger:  The patient does have some intact active flexion at the DIP joint but is unable to flex the PIP joint actively secondary to his deformity; passively, I can flex the small finger PIP joint to approximately 30° but this is painful.    Resp NL  Abd NG  Perf intact    Diagnostic Results:     Xray -  x-rays left hand small finger demonstrate a dorsal PIP dislocation with swan-neck deformity of the small finger  EMG - none  MRI left small finger-   Significant dorsal subluxation of the middle phalanx relative to the proximal phalanx LEFT PIP.  Associated disruption of the volar plate distal attachment.  Tear at the level of the proximal attachment of the ulnar collateral ligament LEFT 5th DIP with to a lesser extent, suspected injury lower grade radial collateral ligament at that level.    ASSESSMENT/PLAN:      45 y.o. 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  The patient like to proceed with soft tissue reconstructions of the left small finger  fixed/rigid swan neck deformity.  This will likely consist of a combination of open reduction of the PIP joint, volar plate advancement, pinning of the PIP joint in slight flexion, with possible ligament reconstruction and or central slip lengthening/tenotomy and any indicated procedures.  We will proceed with this on December 23, 2020.    The patient has not responded to adequate non operative treatment at this time and/or non operative treatment is not indicated. Thus, the risks, benefits and alternatives to surgery were discussed with the patient in detail.  Specific risks include but are not limited to bleeding, infection, vessel and/or nerve damage, pain, numbness, tingling, compartment syndrome, need for additional surgery, failure to return to pre-injury and/or preoperative functional status, inability to return to work, scar sensitivity, delayed healing, complex regional pain syndrome, weakness, pulley injury, tendon injury, bowstringing, partial and/or incomplete relief of symptoms, persistence of and/or worsening of symptoms, hardware and/or surgical failure, prominent and/or symptomatic hardware possibly necessitating future removal, osteomyelitis, amputation, loss of function, stiffness, rotational malalignment, functional debility, dysfunction, decreased  strength, need for prolonged postoperative rehabilitation, malunion, nonunion, deep venous thrombosis, pulmonary embolism, arthritis and death.  The patient states an understanding and wishes to proceed with surgery.   All questions were answered.  No guarantees were implied or stated.  Written informed consent was obtained.          Nikos Bullock M.D.     Please be aware that this note has been generated with the assistance of [employer] voice-to-text.  Please excuse any spelling or grammatical errors.

## 2020-12-14 NOTE — H&P
[facility]  History & Physical  Orthopedics     SUBJECTIVE:       COVID-19 attestation:  This patient was treated during the COVID-19 pandemic.  This was discussed with the patient, they are aware of our current policies and procedures, were given the option of delaying their visit and or switching to a virtual visit, delaying their surgery when applicable, and they elect to proceed.     Chief Complaint:  Left small finger injury/deformity     Referring Provider: No ref. provider found      History of Present Illness:  Patient is a 45 y.o. right hand dominant male who presents today with complaints of left small finger injury/deformity.  The patient notes a remote injury 10 years ago while playing football to his left small finger.  He notes that he had a slight subsequent deformity but could achieve full flexion of the finger into the palm at that time.  He then sustained a re-injury approximately 1.5 months ago when he jammed his left small finger while wrestling.  He had significant deformity and has been unable to flex the finger or get it into the palm since that time.  This has provided difficulty while working out.  Pain is mild at this time.  No other complaints today.      Interval history December 14, 2020:  The patient returns today for re-evaluation of the left small finger.  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  He was recently sent for MRI and returns for these results today.  He does note that the left small finger is mildly to moderately painful but does cause significant dysfunction and interferes with his activities of daily living and functional use of the left upper extremity.  He returns for re-evaluation with no other or new complaints today.     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     Onset of symptoms/DOI was 10 years ago with re-injury 1.5 months ago.     Symptoms are aggravated by  activity and movement.     Symptoms are alleviated by rest.     Symptoms consist of pain, swelling and decreased ROM.     The patient rates their pain as mild     Attempted treatment(s) and/or interventions include rest and activity modification.     The patient denies any fevers, chills, N/V, D/C and presents for evaluation.             Past Medical History:   Diagnosis Date    AC joint dislocation      Arthritis      GERD (gastroesophageal reflux disease) 2008    HIV (human immunodeficiency virus infection)      Hypertension              Past Surgical History:   Procedure Laterality Date    ARTHROSCOPIC DEBRIDEMENT OF SHOULDER Left 1/14/2019     Procedure: DEBRIDEMENT SLAP, SHOULDER, ARTHROSCOPIC;  Surgeon: Rakesh العلي MD;  Location: [facility];  Service: Orthopedics;  Laterality: Left;  regional w/o catheter     ARTHROSCOPY OF SHOULDER WITH DECOMPRESSION OF SUBACROMIAL SPACE Left 1/14/2019     Procedure: ARTHROSCOPY, SHOULDER, WITH SUBACROMIAL DECOMPRESSION;  Surgeon: Rakesh العلي MD;  Location: [facility];  Service: Orthopedics;  Laterality: Left;    COLONOSCOPY N/A 12/13/2018     Procedure: COLONOSCOPY;  Surgeon: Shaniqua Chawla MD;  Location: [facility] (4TH FLR);  Service: Endoscopy;  Laterality: N/A;  schedule as 45 minute case ASAP     COSMETIC SURGERY        FIXATION OF TENDON Left 1/14/2019     Procedure: OPEN BICEPS SUBPECTORALIS TENODESIS;  Surgeon: Rakesh العلي MD;  Location: [facility];  Service: Orthopedics;  Laterality: Left;    FRACTURE SURGERY        HERNIA REPAIR        KNEE SURGERY        LYMPH NODE BIOPSY Left 3/27/2019     Procedure: BIOPSY, LYMPH NODE Left Inguinal;  Surgeon: Patrick Lauren MD;  Location: [facility] OR 2ND FLR;  Service: General;  Laterality: Left;      Review of patient's allergies indicates:  No Known Allergies  Social History          Social History Narrative    Not on file            Family History   Problem Relation Age of Onset    Stomach  "cancer Mother      Lung cancer Maternal Grandmother      Melanoma Neg Hx      Celiac disease Neg Hx      Cirrhosis Neg Hx      Colon cancer Neg Hx      Colon polyps Neg Hx      Crohn's disease Neg Hx      Cystic fibrosis Neg Hx      Esophageal cancer Neg Hx      Hemochromatosis Neg Hx      Inflammatory bowel disease Neg Hx      Irritable bowel syndrome Neg Hx      Liver cancer Neg Hx      Liver disease Neg Hx      Rectal cancer Neg Hx      Ulcerative colitis Neg Hx      Wilner's disease Neg Hx      Lymphoma Neg Hx      Tuberculosis Neg Hx      Scleroderma Neg Hx      Rheum arthritis Neg Hx      Multiple sclerosis Neg Hx      Lupus Neg Hx      Psoriasis Neg Hx      Skin cancer Neg Hx              Current Outpatient Medications:     efavirenz-emtrictabine-tenofovir 600-200-300 mg (ATRIPLA) 600-200-300 mg Tab, Take 1 tablet by mouth every evening., Disp: 30 tablet, Rfl: 11    insulin syringe-needle U-100 1 mL 30 gauge X 7/16" Syrg, , Disp: , Rfl:     lisinopriL 10 MG tablet, Take 1 tablet (10 mg total) by mouth once daily., Disp: 30 tablet, Rfl: 11    SEROSTIM 6 mg SolR, INJECT UNDER THE SKIN EVERY DAY, Disp: 30 each, Rfl: 11        Review of Systems:  Constitutional: no fever or chills  Eyes: no visual changes  ENT: no nasal congestion or sore throat  Respiratory: no cough or shortness of breath  Cardiovascular: no chest pain  Gastrointestinal: no nausea or vomiting, tolerating diet  Musculoskeletal: pain, soreness and decreased ROM     OBJECTIVE:       Vital Signs (Most Recent):  Vitals   There were no vitals filed for this visit.     There is no height or weight on file to calculate BMI.        Physical Exam:  Constitutional: The patient appears well-developed and well-nourished. No distress.   Skin: No lesions appreciated  Head: Normocephalic and atraumatic.   Nose: Nose normal.   Ears: No deformities seen  Eyes: Conjunctivae and EOM are normal.   Neck: No tracheal deviation present. "   Cardiovascular: Normal rate and intact distal pulses.    Pulmonary/Chest: Effort normal. No respiratory distress.   Abdominal: There is no guarding.   Neurological: The patient is alert.   Psychiatric: The patient has a normal mood and affect.      Left Hand/Wrist Examination:     Observation/Inspection:  Swelling                       present                          Deformity                     patient with a fixed swan-neck deformity left small finger with a supple DIP but relatively fixed PIP  Discoloration               none                  Scars                           none                  Atrophy                        none     HAND/WRIST EXAMINATION:  Finkelstein's Test                                Neg  WHAT Test                                         Neg  Snuff box tenderness                          Neg  Bess's Test                                     Neg  Hook of Hamate Tenderness              Neg  CMC grind                                           Neg  Circumduction test                              Neg     Neurovascular Exam:  Digits WWP, brisk CR < 3s throughout  NVI motor/LTS to M/R/U nerves, radial pulse 2+  Tinel's Test - Carpal Tunnel                Neg  Tinel's Test - Cubital Tunnel               Neg  Phalen's Test                                      Neg  Median Nerve Compression Test       Neg     ROM hand/wrist/elbow full, painless except for in the left small finger:  The patient does have some intact active flexion at the DIP joint but is unable to flex the PIP joint actively secondary to his deformity; passively, I can flex the small finger PIP joint to approximately 30° but this is painful.     Resp NL  Abd NG  Perf intact     Diagnostic Results:     Xray -  x-rays left hand small finger demonstrate a dorsal PIP dislocation with swan-neck deformity of the small finger  EMG - none  MRI left small finger-   Significant dorsal subluxation of the middle phalanx relative to the  proximal phalanx LEFT PIP.  Associated disruption of the volar plate distal attachment.  Tear at the level of the proximal attachment of the ulnar collateral ligament LEFT 5th DIP with to a lesser extent, suspected injury lower grade radial collateral ligament at that level.     ASSESSMENT/PLAN:       45 y.o. 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  The patient like to proceed with soft tissue reconstructions of the left small finger fixed/rigid swan neck deformity.  This will likely consist of a combination of open reduction of the PIP joint, volar plate advancement, pinning of the PIP joint in slight flexion, with possible ligament reconstruction and or central slip lengthening/tenotomy and any indicated procedures.  We will proceed with this on December 23, 2020.     The patient has not responded to adequate non operative treatment at this time and/or non operative treatment is not indicated. Thus, the risks, benefits and alternatives to surgery were discussed with the patient in detail.  Specific risks include but are not limited to bleeding, infection, vessel and/or nerve damage, pain, numbness, tingling, compartment syndrome, need for additional surgery, failure to return to pre-injury and/or preoperative functional status, inability to return to work, scar sensitivity, delayed healing, complex regional pain syndrome, weakness, pulley injury, tendon injury, bowstringing, partial and/or incomplete relief of symptoms, persistence of and/or worsening of symptoms, hardware and/or surgical failure, prominent and/or symptomatic hardware possibly necessitating future removal, osteomyelitis, amputation, loss of function, stiffness, rotational malalignment, functional debility, dysfunction, decreased  strength, need for prolonged postoperative rehabilitation, malunion, nonunion, deep venous thrombosis, pulmonary embolism,  arthritis and death.  The patient states an understanding and wishes to proceed with surgery.   All questions were answered.  No guarantees were implied or stated.  Written informed consent was obtained.             Nikos Bullock M.D.     · Please be aware that this note has been generated with the assistance of [employer] voice-to-text.  Please excuse any spelling or grammatical errors.

## 2020-12-15 ENCOUNTER — PATIENT MESSAGE (OUTPATIENT)
Dept: INFECTIOUS DISEASES | Facility: CLINIC | Age: 45
End: 2020-12-15

## 2020-12-17 ENCOUNTER — PATIENT MESSAGE (OUTPATIENT)
Dept: SURGERY | Facility: HOSPITAL | Age: 45
End: 2020-12-17

## 2020-12-17 ENCOUNTER — TELEPHONE (OUTPATIENT)
Dept: ORTHOPEDICS | Facility: CLINIC | Age: 45
End: 2020-12-17

## 2020-12-17 NOTE — TELEPHONE ENCOUNTER
Returned patient's message regarding surgery. Advised that with this type of injury it is not advised to postpone surgery. Patient is concerned with his schedule in January and that he will not have adequate time for therapy/post operative care. Would like to proceed with surgery on 1/27. I will make the necessary changes to his perioperative appointments.     Connie Garcia MS, OTC  Clinical Assistant to Dr. Nikos Bullock

## 2020-12-21 ENCOUNTER — SPECIALTY PHARMACY (OUTPATIENT)
Dept: PHARMACY | Facility: CLINIC | Age: 45
End: 2020-12-21

## 2020-12-21 NOTE — TELEPHONE ENCOUNTER
Specialty Pharmacy - Refill Coordination    Specialty Medication Orders Linked to Encounter      Most Recent Value   Medication #1  efavirenz-emtrictabine-tenofovir 600-200-300 mg (ATRIPLA) 600-200-300 mg Tab (Order#[id number], Rx#[id number])          Refill Questions - Documented Responses      Most Recent Value   Relationship to patient of person spoken to?  Self   HIPAA/medical authority confirmed?  Yes   Any changes in contact preferences or allowed representatives?  No   Has the patient had any insurance changes?  No   Has the patient had any changes to specialty medication, dose, or instructions?  No   Has the patient started taking any new medications, herbals, or supplements?  No   Has the patient been diagnosed with any new medical conditions?  No   Does the patient have any new allergies to medications or foods?  No   Does the patient have any concerns about side effects?  No   Can the patient store medication/sharps container properly (at the correct temperature, away from children/pets, etc.)?  Yes   Can the patient call emergency services (911) in the event of an emergency?  Yes   Does the patient have any concerns or questions about taking or administering this medication as prescribed?  No   How many doses did the patient miss in the past 4 weeks or since the last fill?  0   How many doses does the patient have on hand?  6   How many days does the patient report on hand quantity will last?  6   Does the number of doses/days supply remaining match pharmacy expected amounts?  Yes   Does the patient feel that this medication is effective?  Yes   During the past 4 weeks, has patient missed any activities due to condition or medication?  No   During the past 4 weeks, did patient have any of the following urgent care visits?  None   How will the patient receive the medication?  Mail   When does the patient need to receive the medication?  12/27/20   Shipping Address  Home   Address in [facility]  "confirmed and updated if neccessary?  Yes   Expected Copay ($)  0   Is the patient able to afford the medication copay?  Yes   Payment Method  zero copay   Days supply of Refill  30   Would patient like to speak to a pharmacist?  No   Do you want to trigger an intervention?  No   Do you want to trigger an additional referral task?  No   Refill activity completed?  Yes   Refill activity plan  Refill scheduled   Shipment/Pickup Date:  12/22/20          Current Outpatient Medications   Medication Sig    efavirenz-emtrictabine-tenofovir 600-200-300 mg (ATRIPLA) 600-200-300 mg Tab Take 1 tablet by mouth every evening.    insulin syringe-needle U-100 1 mL 30 gauge X 7/16" Syrg     lisinopriL 10 MG tablet Take 1 tablet (10 mg total) by mouth once daily.    SEROSTIM 6 mg SolR INJECT UNDER THE SKIN EVERY DAY   Last reviewed on 12/7/2020  2:04 PM by Fabi Ford LPN    Review of patient's allergies indicates:  No Known Allergies Last reviewed on  12/14/2020 1:35 PM by Nikos Bullock      Tasks added this encounter   No tasks added.   Tasks due within next 3 months   12/17/2020 - Refill Call (Auto Added)     Brie Bob  [facility]  [address] [place] [id number]  Phone: [phone]  Fax: [phone]      "

## 2021-01-19 ENCOUNTER — ANESTHESIA EVENT (OUTPATIENT)
Dept: SURGERY | Facility: HOSPITAL | Age: 46
End: 2021-01-19
Payer: MEDICARE

## 2021-01-24 ENCOUNTER — LAB VISIT (OUTPATIENT)
Dept: SPORTS MEDICINE | Facility: CLINIC | Age: 46
End: 2021-01-24
Payer: MEDICARE

## 2021-01-24 DIAGNOSIS — Z01.818 PREOP TESTING: ICD-10-CM

## 2021-01-24 LAB — SARS-COV-2 RNA RESP QL NAA+PROBE: NOT DETECTED

## 2021-01-24 PROCEDURE — U0003 INFECTIOUS AGENT DETECTION BY NUCLEIC ACID (DNA OR RNA); SEVERE ACUTE RESPIRATORY SYNDROME CORONAVIRUS 2 (SARS-COV-2) (CORONAVIRUS DISEASE [COVID-19]), AMPLIFIED PROBE TECHNIQUE, MAKING USE OF HIGH THROUGHPUT TECHNOLOGIES AS DESCRIBED BY CMS-2020-01-R: HCPCS

## 2021-01-26 ENCOUNTER — TELEPHONE (OUTPATIENT)
Dept: ORTHOPEDICS | Facility: CLINIC | Age: 46
End: 2021-01-26

## 2021-01-26 ENCOUNTER — SPECIALTY PHARMACY (OUTPATIENT)
Dept: PHARMACY | Facility: CLINIC | Age: 46
End: 2021-01-26

## 2021-01-26 RX ORDER — HYDROCODONE BITARTRATE AND ACETAMINOPHEN 7.5; 325 MG/1; MG/1
1 TABLET ORAL EVERY 6 HOURS PRN
Qty: 28 TABLET | Refills: 0 | Status: SHIPPED | OUTPATIENT
Start: 2021-01-26 | End: 2023-06-14

## 2021-01-27 ENCOUNTER — ANESTHESIA (OUTPATIENT)
Dept: SURGERY | Facility: HOSPITAL | Age: 46
End: 2021-01-27
Payer: MEDICARE

## 2021-01-27 ENCOUNTER — HOSPITAL ENCOUNTER (OUTPATIENT)
Facility: HOSPITAL | Age: 46
Discharge: HOME | End: 2021-01-27
Attending: ORTHOPAEDIC SURGERY | Admitting: ORTHOPAEDIC SURGERY
Payer: MEDICARE

## 2021-01-27 DIAGNOSIS — M20.032 DEFORMITY, FINGER, SWAN NECK, LEFT: ICD-10-CM

## 2021-01-27 PROCEDURE — 71000033 HC RECOVERY, INTIAL HOUR: Performed by: ORTHOPAEDIC SURGERY

## 2021-01-27 PROCEDURE — 36000709 HC OR TIME LEV III EA ADD 15 MIN: Performed by: ORTHOPAEDIC SURGERY

## 2021-01-27 PROCEDURE — 76942 ECHO GUIDE FOR BIOPSY: CPT | Performed by: STUDENT IN AN ORGANIZED HEALTH CARE EDUCATION/TRAINING PROGRAM

## 2021-01-27 PROCEDURE — 26476: ICD-10-PCS | Mod: 51,F4,GC,ICN | Performed by: ORTHOPAEDIC SURGERY

## 2021-01-27 PROCEDURE — 63600175 PHARM REV CODE 636 W HCPCS: Performed by: STUDENT IN AN ORGANIZED HEALTH CARE EDUCATION/TRAINING PROGRAM

## 2021-01-27 PROCEDURE — 94761 N-INVAS EAR/PLS OXIMETRY MLT: CPT

## 2021-01-27 PROCEDURE — 63600175 PHARM REV CODE 636 W HCPCS

## 2021-01-27 PROCEDURE — 25000003 PHARM REV CODE 250: Performed by: NURSE ANESTHETIST, CERTIFIED REGISTERED

## 2021-01-27 PROCEDURE — 25000003 PHARM REV CODE 250: Performed by: STUDENT IN AN ORGANIZED HEALTH CARE EDUCATION/TRAINING PROGRAM

## 2021-01-27 PROCEDURE — 26548 RECONSTRUCT FINGER JOINT: CPT | Mod: F4,GC,ICN, | Performed by: ORTHOPAEDIC SURGERY

## 2021-01-27 PROCEDURE — 20245 BONE BIOPSY OPEN DEEP: CPT | Mod: 51,LT,GC,ICN | Performed by: ORTHOPAEDIC SURGERY

## 2021-01-27 PROCEDURE — 26785 TREAT FINGER DISLOCATION: CPT | Mod: 59,51,F4,GC | Performed by: ORTHOPAEDIC SURGERY

## 2021-01-27 PROCEDURE — 76942 PR U/S GUIDANCE FOR NEEDLE GUIDANCE: ICD-10-PCS | Mod: 26,,, | Performed by: ANESTHESIOLOGY

## 2021-01-27 PROCEDURE — 26785 PR OPEN TX INTERPHALANGEAL JOINT DISLOCATION 1: ICD-10-PCS | Mod: 59,51,F4,GC | Performed by: ORTHOPAEDIC SURGERY

## 2021-01-27 PROCEDURE — 64415 NJX AA&/STRD BRCH PLXS IMG: CPT | Performed by: STUDENT IN AN ORGANIZED HEALTH CARE EDUCATION/TRAINING PROGRAM

## 2021-01-27 PROCEDURE — 71000015 HC POSTOP RECOV 1ST HR: Performed by: ORTHOPAEDIC SURGERY

## 2021-01-27 PROCEDURE — 26548 PR FIX FINGER,VOLAR PLATE,I-P JT: ICD-10-PCS | Mod: F4,GC,ICN, | Performed by: ORTHOPAEDIC SURGERY

## 2021-01-27 PROCEDURE — 37000008 HC ANESTHESIA 1ST 15 MINUTES: Performed by: ORTHOPAEDIC SURGERY

## 2021-01-27 PROCEDURE — 64415 PR NERVE BLOCK INJ, ANES/STEROID, BRACHIAL PLEXUS, INCL IMAG GUIDANCE: ICD-10-PCS | Mod: 59,LT,, | Performed by: ANESTHESIOLOGY

## 2021-01-27 PROCEDURE — 64415 NJX AA&/STRD BRCH PLXS IMG: CPT | Mod: 59,LT,, | Performed by: ANESTHESIOLOGY

## 2021-01-27 PROCEDURE — 25000003 PHARM REV CODE 250: Performed by: ORTHOPAEDIC SURGERY

## 2021-01-27 PROCEDURE — 27200750 HC INSULATED NEEDLE/ STIMUPLEX: Performed by: STUDENT IN AN ORGANIZED HEALTH CARE EDUCATION/TRAINING PROGRAM

## 2021-01-27 PROCEDURE — 26525 PR RELEASE I-P JT CONTRACTURE: ICD-10-PCS | Mod: 51,F4,GC,ICN | Performed by: ORTHOPAEDIC SURGERY

## 2021-01-27 PROCEDURE — 37000009 HC ANESTHESIA EA ADD 15 MINS: Performed by: ORTHOPAEDIC SURGERY

## 2021-01-27 PROCEDURE — 76942 ECHO GUIDE FOR BIOPSY: CPT | Mod: 26,,, | Performed by: ANESTHESIOLOGY

## 2021-01-27 PROCEDURE — D9220A PRA ANESTHESIA: Mod: ANES,,, | Performed by: ANESTHESIOLOGY

## 2021-01-27 PROCEDURE — 20245 PR BIOPSY, BONE, OPEN, EXC; DEEP: ICD-10-PCS | Mod: 51,LT,GC,ICN | Performed by: ORTHOPAEDIC SURGERY

## 2021-01-27 PROCEDURE — D9220A PRA ANESTHESIA: Mod: CRNA,,, | Performed by: NURSE ANESTHETIST, CERTIFIED REGISTERED

## 2021-01-27 PROCEDURE — C1713 ANCHOR/SCREW BN/BN,TIS/BN: HCPCS | Performed by: ORTHOPAEDIC SURGERY

## 2021-01-27 PROCEDURE — 27201423 OPTIME MED/SURG SUP & DEVICES STERILE SUPPLY: Performed by: ORTHOPAEDIC SURGERY

## 2021-01-27 PROCEDURE — 36000708 HC OR TIME LEV III 1ST 15 MIN: Performed by: ORTHOPAEDIC SURGERY

## 2021-01-27 PROCEDURE — 63600175 PHARM REV CODE 636 W HCPCS: Performed by: NURSE ANESTHETIST, CERTIFIED REGISTERED

## 2021-01-27 PROCEDURE — 26525 RELEASE FINGER CONTRACTURE: CPT | Mod: 51,F4,GC,ICN | Performed by: ORTHOPAEDIC SURGERY

## 2021-01-27 PROCEDURE — 26476 TENDON LENGTHENING: CPT | Mod: 51,F4,GC,ICN | Performed by: ORTHOPAEDIC SURGERY

## 2021-01-27 PROCEDURE — D9220A PRA ANESTHESIA: ICD-10-PCS | Mod: CRNA,,, | Performed by: NURSE ANESTHETIST, CERTIFIED REGISTERED

## 2021-01-27 PROCEDURE — 99900035 HC TECH TIME PER 15 MIN (STAT)

## 2021-01-27 PROCEDURE — D9220A PRA ANESTHESIA: ICD-10-PCS | Mod: ANES,,, | Performed by: ANESTHESIOLOGY

## 2021-01-27 DEVICE — ANCHOR SUT 3-0 FW KWIRE 1.35MM: Type: IMPLANTABLE DEVICE | Site: FINGER | Status: FUNCTIONAL

## 2021-01-27 DEVICE — IMPLANTABLE DEVICE: Type: IMPLANTABLE DEVICE | Site: FINGER | Status: FUNCTIONAL

## 2021-01-27 RX ORDER — ACETAMINOPHEN 500 MG
1000 TABLET ORAL ONCE
Status: COMPLETED | OUTPATIENT
Start: 2021-01-27 | End: 2021-01-27

## 2021-01-27 RX ORDER — MIDAZOLAM HYDROCHLORIDE 1 MG/ML
INJECTION INTRAMUSCULAR; INTRAVENOUS
Status: COMPLETED
Start: 2021-01-27 | End: 2021-01-27

## 2021-01-27 RX ORDER — MIDAZOLAM HYDROCHLORIDE 1 MG/ML
0.5 INJECTION INTRAMUSCULAR; INTRAVENOUS
Status: DISCONTINUED | OUTPATIENT
Start: 2021-01-27 | End: 2021-01-27 | Stop reason: HOSPADM

## 2021-01-27 RX ORDER — ONDANSETRON 2 MG/ML
4 INJECTION INTRAMUSCULAR; INTRAVENOUS DAILY PRN
Status: DISCONTINUED | OUTPATIENT
Start: 2021-01-27 | End: 2021-01-27 | Stop reason: HOSPADM

## 2021-01-27 RX ORDER — ONDANSETRON 4 MG/1
8 TABLET, ORALLY DISINTEGRATING ORAL EVERY 8 HOURS PRN
Status: DISCONTINUED | OUTPATIENT
Start: 2021-01-27 | End: 2021-01-27 | Stop reason: HOSPADM

## 2021-01-27 RX ORDER — LIDOCAINE HYDROCHLORIDE 10 MG/ML
INJECTION, SOLUTION INTRAVENOUS
Status: DISCONTINUED | OUTPATIENT
Start: 2021-01-27 | End: 2021-01-27

## 2021-01-27 RX ORDER — MUPIROCIN 20 MG/G
OINTMENT TOPICAL
Status: DISCONTINUED | OUTPATIENT
Start: 2021-01-27 | End: 2021-01-27 | Stop reason: HOSPADM

## 2021-01-27 RX ORDER — SODIUM CHLORIDE 0.9 % (FLUSH) 0.9 %
10 SYRINGE (ML) INJECTION
Status: DISCONTINUED | OUTPATIENT
Start: 2021-01-27 | End: 2021-01-27 | Stop reason: HOSPADM

## 2021-01-27 RX ORDER — CEFAZOLIN SODIUM 1 G/3ML
2 INJECTION, POWDER, FOR SOLUTION INTRAMUSCULAR; INTRAVENOUS
Status: DISCONTINUED | OUTPATIENT
Start: 2021-01-27 | End: 2021-01-27 | Stop reason: HOSPADM

## 2021-01-27 RX ORDER — DEXMEDETOMIDINE HYDROCHLORIDE 100 UG/ML
INJECTION, SOLUTION INTRAVENOUS
Status: DISCONTINUED | OUTPATIENT
Start: 2021-01-27 | End: 2021-01-27

## 2021-01-27 RX ORDER — ONDANSETRON 2 MG/ML
INJECTION INTRAMUSCULAR; INTRAVENOUS
Status: DISCONTINUED | OUTPATIENT
Start: 2021-01-27 | End: 2021-01-27

## 2021-01-27 RX ORDER — PROPOFOL 10 MG/ML
VIAL (ML) INTRAVENOUS CONTINUOUS PRN
Status: DISCONTINUED | OUTPATIENT
Start: 2021-01-27 | End: 2021-01-27

## 2021-01-27 RX ORDER — CELECOXIB 200 MG/1
400 CAPSULE ORAL ONCE
Status: COMPLETED | OUTPATIENT
Start: 2021-01-27 | End: 2021-01-27

## 2021-01-27 RX ORDER — FENTANYL CITRATE 50 UG/ML
25 INJECTION, SOLUTION INTRAMUSCULAR; INTRAVENOUS EVERY 5 MIN PRN
Status: DISCONTINUED | OUTPATIENT
Start: 2021-01-27 | End: 2021-01-27 | Stop reason: HOSPADM

## 2021-01-27 RX ORDER — HYDROCODONE BITARTRATE AND ACETAMINOPHEN 5; 325 MG/1; MG/1
1 TABLET ORAL EVERY 4 HOURS PRN
Status: DISCONTINUED | OUTPATIENT
Start: 2021-01-27 | End: 2021-01-27 | Stop reason: HOSPADM

## 2021-01-27 RX ORDER — OXYCODONE HYDROCHLORIDE 5 MG/1
5 TABLET ORAL
Status: DISCONTINUED | OUTPATIENT
Start: 2021-01-27 | End: 2021-01-27 | Stop reason: HOSPADM

## 2021-01-27 RX ORDER — FENTANYL CITRATE 50 UG/ML
INJECTION, SOLUTION INTRAMUSCULAR; INTRAVENOUS
Status: COMPLETED
Start: 2021-01-27 | End: 2021-01-27

## 2021-01-27 RX ORDER — CEFAZOLIN SODIUM 1 G/3ML
INJECTION, POWDER, FOR SOLUTION INTRAMUSCULAR; INTRAVENOUS
Status: DISCONTINUED | OUTPATIENT
Start: 2021-01-27 | End: 2021-01-27

## 2021-01-27 RX ORDER — SODIUM CHLORIDE 9 MG/ML
INJECTION, SOLUTION INTRAVENOUS CONTINUOUS PRN
Status: DISCONTINUED | OUTPATIENT
Start: 2021-01-27 | End: 2021-01-27

## 2021-01-27 RX ORDER — ACETAMINOPHEN 500 MG
1000 TABLET ORAL ONCE
Status: DISCONTINUED | OUTPATIENT
Start: 2021-01-28 | End: 2021-01-27

## 2021-01-27 RX ADMIN — MIDAZOLAM 2 MG: 1 INJECTION INTRAMUSCULAR; INTRAVENOUS at 07:01

## 2021-01-27 RX ADMIN — PROPOFOL 100 MCG/KG/MIN: 10 INJECTION, EMULSION INTRAVENOUS at 08:01

## 2021-01-27 RX ADMIN — SODIUM CHLORIDE: 0.9 INJECTION, SOLUTION INTRAVENOUS at 07:01

## 2021-01-27 RX ADMIN — DEXMEDETOMIDINE HYDROCHLORIDE 20 MCG: 100 INJECTION, SOLUTION, CONCENTRATE INTRAVENOUS at 08:01

## 2021-01-27 RX ADMIN — MUPIROCIN: 20 OINTMENT TOPICAL at 07:01

## 2021-01-27 RX ADMIN — ONDANSETRON 4 MG: 2 INJECTION, SOLUTION INTRAMUSCULAR; INTRAVENOUS at 08:01

## 2021-01-27 RX ADMIN — CEFAZOLIN 2 G: 330 INJECTION, POWDER, FOR SOLUTION INTRAMUSCULAR; INTRAVENOUS at 08:01

## 2021-01-27 RX ADMIN — CELECOXIB 400 MG: 200 CAPSULE ORAL at 07:01

## 2021-01-27 RX ADMIN — ACETAMINOPHEN 1000 MG: 500 TABLET ORAL at 07:01

## 2021-01-27 RX ADMIN — FENTANYL CITRATE 100 MCG: 50 INJECTION INTRAMUSCULAR; INTRAVENOUS at 07:01

## 2021-01-27 RX ADMIN — MIDAZOLAM 2 MG: 1 INJECTION INTRAMUSCULAR; INTRAVENOUS at 08:01

## 2021-01-27 RX ADMIN — LIDOCAINE HYDROCHLORIDE 100 MG: 10 INJECTION, SOLUTION INTRAVENOUS at 08:01

## 2021-01-27 RX ADMIN — FENTANYL CITRATE 100 MCG: 50 INJECTION, SOLUTION INTRAMUSCULAR; INTRAVENOUS at 07:01

## 2021-01-28 ENCOUNTER — PATIENT MESSAGE (OUTPATIENT)
Dept: ORTHOPEDICS | Facility: CLINIC | Age: 46
End: 2021-01-28

## 2021-01-28 DIAGNOSIS — G89.18 POST-OPERATIVE PAIN: Primary | ICD-10-CM

## 2021-01-28 RX ORDER — OXYCODONE AND ACETAMINOPHEN 5; 325 MG/1; MG/1
1 TABLET ORAL EVERY 6 HOURS PRN
Qty: 28 TABLET | Refills: 0 | Status: SHIPPED | OUTPATIENT
Start: 2021-01-28 | End: 2022-06-03

## 2021-01-29 VITALS
RESPIRATION RATE: 22 BRPM | HEART RATE: 72 BPM | SYSTOLIC BLOOD PRESSURE: 162 MMHG | OXYGEN SATURATION: 99 % | BODY MASS INDEX: 26.18 KG/M2 | HEIGHT: 76 IN | WEIGHT: 215 LBS | DIASTOLIC BLOOD PRESSURE: 89 MMHG | TEMPERATURE: 97 F

## 2021-02-01 ENCOUNTER — SPECIALTY PHARMACY (OUTPATIENT)
Dept: PHARMACY | Facility: CLINIC | Age: 46
End: 2021-02-01

## 2021-02-08 ENCOUNTER — OFFICE VISIT (OUTPATIENT)
Dept: ORTHOPEDICS | Facility: CLINIC | Age: 46
End: 2021-02-08
Payer: MEDICARE

## 2021-02-08 ENCOUNTER — CLINICAL SUPPORT (OUTPATIENT)
Dept: REHABILITATION | Facility: HOSPITAL | Age: 46
End: 2021-02-08
Payer: MEDICARE

## 2021-02-08 VITALS
BODY MASS INDEX: 26.18 KG/M2 | HEIGHT: 76 IN | SYSTOLIC BLOOD PRESSURE: 146 MMHG | DIASTOLIC BLOOD PRESSURE: 81 MMHG | HEART RATE: 81 BPM | WEIGHT: 214.94 LBS

## 2021-02-08 DIAGNOSIS — Z98.890 POST-OPERATIVE STATE: Primary | ICD-10-CM

## 2021-02-08 DIAGNOSIS — Z98.890 POST-OPERATIVE STATE: ICD-10-CM

## 2021-02-08 DIAGNOSIS — M79.645 PAIN IN LEFT FINGER(S): ICD-10-CM

## 2021-02-08 PROCEDURE — 99999 PR PBB SHADOW E&M-EST. PATIENT-LVL IV: CPT | Mod: PBBFAC,,, | Performed by: PHYSICIAN ASSISTANT

## 2021-02-08 PROCEDURE — L3933 FO W/O JOINTS CF: HCPCS

## 2021-02-08 PROCEDURE — 99024 POSTOP FOLLOW-UP VISIT: CPT | Mod: POP,,, | Performed by: PHYSICIAN ASSISTANT

## 2021-02-08 PROCEDURE — 99214 OFFICE O/P EST MOD 30 MIN: CPT | Mod: PBBFAC | Performed by: PHYSICIAN ASSISTANT

## 2021-02-08 PROCEDURE — 99999 PR PBB SHADOW E&M-EST. PATIENT-LVL IV: ICD-10-PCS | Mod: PBBFAC,,, | Performed by: PHYSICIAN ASSISTANT

## 2021-02-08 PROCEDURE — 99024 PR POST-OP FOLLOW-UP VISIT: ICD-10-PCS | Mod: POP,,, | Performed by: PHYSICIAN ASSISTANT

## 2021-02-09 PROBLEM — M25.642 DECREASED RANGE OF MOTION OF FINGER OF LEFT HAND: Status: ACTIVE | Noted: 2019-01-18

## 2021-02-09 PROBLEM — M79.645 PAIN IN LEFT FINGER(S): Status: ACTIVE | Noted: 2021-02-09

## 2021-02-14 ENCOUNTER — PATIENT MESSAGE (OUTPATIENT)
Dept: ORTHOPEDICS | Facility: CLINIC | Age: 46
End: 2021-02-14

## 2021-02-17 DIAGNOSIS — Z98.890 POST-OPERATIVE STATE: Primary | ICD-10-CM

## 2021-02-22 ENCOUNTER — HOSPITAL ENCOUNTER (OUTPATIENT)
Dept: RADIOLOGY | Facility: OTHER | Age: 46
Discharge: HOME | End: 2021-02-22
Attending: ORTHOPAEDIC SURGERY
Payer: MEDICARE

## 2021-02-22 ENCOUNTER — OFFICE VISIT (OUTPATIENT)
Dept: ORTHOPEDICS | Facility: CLINIC | Age: 46
End: 2021-02-22
Payer: MEDICARE

## 2021-02-22 DIAGNOSIS — T81.40XD POSTOPERATIVE INFECTION, UNSPECIFIED TYPE, SUBSEQUENT ENCOUNTER: Primary | ICD-10-CM

## 2021-02-22 DIAGNOSIS — Z98.890 POST-OPERATIVE STATE: Primary | ICD-10-CM

## 2021-02-22 DIAGNOSIS — Z98.890 POST-OPERATIVE STATE: ICD-10-CM

## 2021-02-22 PROCEDURE — 73140 X-RAY EXAM OF FINGER(S): CPT | Mod: TC,FY,LT

## 2021-02-22 PROCEDURE — 73140 XR FINGER 2 OR MORE VIEWS LEFT: ICD-10-PCS | Mod: 26,LT,, | Performed by: RADIOLOGY

## 2021-02-22 PROCEDURE — 99024 POSTOP FOLLOW-UP VISIT: CPT | Mod: POP,,, | Performed by: ORTHOPAEDIC SURGERY

## 2021-02-22 PROCEDURE — 73140 X-RAY EXAM OF FINGER(S): CPT | Mod: 26,LT,, | Performed by: RADIOLOGY

## 2021-02-22 PROCEDURE — 99024 PR POST-OP FOLLOW-UP VISIT: ICD-10-PCS | Mod: POP,,, | Performed by: ORTHOPAEDIC SURGERY

## 2021-02-23 ENCOUNTER — CLINICAL SUPPORT (OUTPATIENT)
Dept: REHABILITATION | Facility: HOSPITAL | Age: 46
End: 2021-02-23
Payer: MEDICARE

## 2021-02-23 DIAGNOSIS — M79.645 PAIN IN LEFT FINGER(S): ICD-10-CM

## 2021-02-23 DIAGNOSIS — R29.898 DECREASED GRIP STRENGTH OF RIGHT HAND: ICD-10-CM

## 2021-02-23 DIAGNOSIS — M25.642 DECREASED RANGE OF MOTION OF FINGER OF LEFT HAND: Primary | ICD-10-CM

## 2021-02-23 DIAGNOSIS — M79.89 SWELLING OF FINGER, RIGHT: ICD-10-CM

## 2021-02-23 PROCEDURE — 97165 OT EVAL LOW COMPLEX 30 MIN: CPT

## 2021-02-23 PROCEDURE — 97110 THERAPEUTIC EXERCISES: CPT

## 2021-02-23 PROCEDURE — 97018 PARAFFIN BATH THERAPY: CPT | Mod: 59

## 2021-02-25 ENCOUNTER — CLINICAL SUPPORT (OUTPATIENT)
Dept: REHABILITATION | Facility: HOSPITAL | Age: 46
End: 2021-02-25
Payer: MEDICARE

## 2021-02-25 DIAGNOSIS — R29.898 DECREASED GRIP STRENGTH OF RIGHT HAND: ICD-10-CM

## 2021-02-25 DIAGNOSIS — M79.645 PAIN IN LEFT FINGER(S): ICD-10-CM

## 2021-02-25 DIAGNOSIS — M79.89 SWELLING OF FINGER, RIGHT: ICD-10-CM

## 2021-02-25 PROCEDURE — 97110 THERAPEUTIC EXERCISES: CPT

## 2021-02-25 PROCEDURE — 97018 PARAFFIN BATH THERAPY: CPT | Mod: 59

## 2021-02-26 ENCOUNTER — SPECIALTY PHARMACY (OUTPATIENT)
Dept: PHARMACY | Facility: CLINIC | Age: 46
End: 2021-02-26

## 2021-03-18 ENCOUNTER — IMMUNIZATION (OUTPATIENT)
Dept: PRIMARY CARE CLINIC | Facility: CLINIC | Age: 46
End: 2021-03-18
Payer: MEDICARE

## 2021-03-18 DIAGNOSIS — Z23 NEED FOR VACCINATION: Primary | ICD-10-CM

## 2021-03-18 PROCEDURE — 0011A COVID-19, MRNA, LNP-S, PF, 100 MCG/0.5 ML DOSE VACCINE: CPT | Mod: PBBFAC,PN

## 2021-03-31 ENCOUNTER — PATIENT MESSAGE (OUTPATIENT)
Dept: RESEARCH | Facility: HOSPITAL | Age: 46
End: 2021-03-31

## 2021-04-01 ENCOUNTER — SPECIALTY PHARMACY (OUTPATIENT)
Dept: PHARMACY | Facility: CLINIC | Age: 46
End: 2021-04-01

## 2021-04-15 ENCOUNTER — IMMUNIZATION (OUTPATIENT)
Dept: PRIMARY CARE CLINIC | Facility: CLINIC | Age: 46
End: 2021-04-15
Payer: MEDICAID

## 2021-04-15 DIAGNOSIS — Z23 NEED FOR VACCINATION: Primary | ICD-10-CM

## 2021-04-15 PROCEDURE — 0012A COVID-19, MRNA, LNP-S, PF, 100 MCG/0.5 ML DOSE VACCINE: CPT | Mod: PBBFAC,PN

## 2021-04-26 ENCOUNTER — SPECIALTY PHARMACY (OUTPATIENT)
Dept: PHARMACY | Facility: CLINIC | Age: 46
End: 2021-04-26

## 2021-05-21 ENCOUNTER — PATIENT MESSAGE (OUTPATIENT)
Dept: RESEARCH | Facility: HOSPITAL | Age: 46
End: 2021-05-21

## 2021-05-25 ENCOUNTER — SPECIALTY PHARMACY (OUTPATIENT)
Dept: PHARMACY | Facility: CLINIC | Age: 46
End: 2021-05-25

## 2021-06-04 RX ORDER — NAPROXEN SODIUM 220 MG
TABLET ORAL
Qty: 100 EACH | Refills: 5 | Status: SHIPPED | OUTPATIENT
Start: 2021-06-04

## 2021-06-22 ENCOUNTER — SPECIALTY PHARMACY (OUTPATIENT)
Dept: PHARMACY | Facility: CLINIC | Age: 46
End: 2021-06-22

## 2021-07-20 ENCOUNTER — SPECIALTY PHARMACY (OUTPATIENT)
Dept: PHARMACY | Facility: CLINIC | Age: 46
End: 2021-07-20

## 2021-08-17 DIAGNOSIS — Z21 HIV POSITIVE: ICD-10-CM

## 2021-08-18 ENCOUNTER — SPECIALTY PHARMACY (OUTPATIENT)
Dept: PHARMACY | Facility: CLINIC | Age: 46
End: 2021-08-18

## 2021-08-18 RX ORDER — EFAVIRENZ, EMTRICITABINE AND TENOFOVIR DISOPROXIL FUMARATE 600; 200; 300 MG/1; MG/1; MG/1
1 TABLET, FILM COATED ORAL NIGHTLY
Qty: 30 TABLET | Refills: 11 | Status: SHIPPED | OUTPATIENT
Start: 2021-08-18 | End: 2022-09-13 | Stop reason: SDUPTHER

## 2021-08-19 ENCOUNTER — PATIENT MESSAGE (OUTPATIENT)
Dept: PHARMACY | Facility: CLINIC | Age: 46
End: 2021-08-19

## 2021-08-24 ENCOUNTER — SPECIALTY PHARMACY (OUTPATIENT)
Dept: PHARMACY | Facility: CLINIC | Age: 46
End: 2021-08-24

## 2021-08-24 ENCOUNTER — PATIENT MESSAGE (OUTPATIENT)
Dept: PHARMACY | Facility: CLINIC | Age: 46
End: 2021-08-24

## 2021-09-23 ENCOUNTER — PATIENT MESSAGE (OUTPATIENT)
Dept: PHARMACY | Facility: CLINIC | Age: 46
End: 2021-09-23

## 2021-10-04 ENCOUNTER — SPECIALTY PHARMACY (OUTPATIENT)
Dept: PHARMACY | Facility: CLINIC | Age: 46
End: 2021-10-04

## 2021-10-28 ENCOUNTER — PATIENT MESSAGE (OUTPATIENT)
Dept: INFECTIOUS DISEASES | Facility: CLINIC | Age: 46
End: 2021-10-28
Payer: MEDICARE

## 2021-11-02 ENCOUNTER — SPECIALTY PHARMACY (OUTPATIENT)
Dept: PHARMACY | Facility: CLINIC | Age: 46
End: 2021-11-02
Payer: MEDICARE

## 2021-12-02 ENCOUNTER — IMMUNIZATION (OUTPATIENT)
Dept: PRIMARY CARE CLINIC | Facility: CLINIC | Age: 46
End: 2021-12-02
Payer: MEDICARE

## 2021-12-02 ENCOUNTER — SPECIALTY PHARMACY (OUTPATIENT)
Dept: PHARMACY | Facility: CLINIC | Age: 46
End: 2021-12-02
Payer: MEDICARE

## 2021-12-02 DIAGNOSIS — Z23 NEED FOR VACCINATION: Primary | ICD-10-CM

## 2021-12-02 PROCEDURE — 0064A COVID-19, MRNA, LNP-S, PF, 100 MCG/0.25 ML DOSE VACCINE (MODERNA BOOSTER): CPT | Mod: PBBFAC,PN

## 2022-01-03 ENCOUNTER — PATIENT MESSAGE (OUTPATIENT)
Dept: PHARMACY | Facility: CLINIC | Age: 47
End: 2022-01-03
Payer: MEDICARE

## 2022-01-07 ENCOUNTER — PATIENT MESSAGE (OUTPATIENT)
Dept: PHARMACY | Facility: CLINIC | Age: 47
End: 2022-01-07
Payer: MEDICARE

## 2022-01-12 ENCOUNTER — SPECIALTY PHARMACY (OUTPATIENT)
Dept: PHARMACY | Facility: CLINIC | Age: 47
End: 2022-01-12
Payer: MEDICARE

## 2022-01-12 NOTE — TELEPHONE ENCOUNTER
"Specialty Pharmacy - Refill Coordination    Specialty Medication Orders Linked to Encounter    Flowsheet Row Most Recent Value   Medication #1 efavirenz-emtrictabine-tenofovir 600-200-300 mg (ATRIPLA) 600-200-300 mg Tab (Order#[id number], Rx#[id number])          Refill Questions - Documented Responses    Flowsheet Row Most Recent Value   Patient Availability and HIPAA Verification    Does patient want to proceed with activity? Yes   HIPAA/medical authority confirmed? Yes   Relationship to patient of person spoken to? Self   Refill Screening Questions    Changes to allergies? No   Changes to medications? No   New conditions since last clinic visit? No   Unplanned office visit, urgent care, ED, or hospital admission in the last 4 weeks? No   How does patient/caregiver feel medication is working? Good   Financial problems or insurance changes? No   How many doses of your specialty medications were missed in the last 4 weeks? 0   Would patient like to speak to a pharmacist? No   When does the patient need to receive the medication? 01/15/22   Refill Delivery Questions    How will the patient receive the medication? Delivery Ashli   When does the patient need to receive the medication? 01/15/22   Shipping Address Home   Address in [facility] confirmed and updated if neccessary? Yes   Expected Copay ($) 0   Is the patient able to afford the medication copay? Yes   Payment Method zero copay   Days supply of Refill 30   Supplies needed? No supplies needed   Refill activity completed? Yes   Refill activity plan Refill scheduled   Shipment/Pickup Date: 01/14/22          Current Outpatient Medications   Medication Sig    efavirenz-emtrictabine-tenofovir 600-200-300 mg (ATRIPLA) 600-200-300 mg Tab Take 1 tablet by mouth every evening.    HYDROcodone-acetaminophen (NORCO) 7.5-325 mg per tablet Take 1 tablet by mouth every 6 (six) hours as needed.    insulin syringe-needle U-100 1 mL 30 gauge X 7/16" Syrg Use as directed "    lisinopriL 10 MG tablet Take 1 tablet (10 mg total) by mouth once daily.    oxyCODONE-acetaminophen (PERCOCET) 5-325 mg per tablet Take 1 tablet by mouth every 6 (six) hours as needed for Pain.    SEROSTIM 6 mg SolR INJECT UNDER THE SKIN EVERY DAY   Last reviewed on 7/20/2021  4:06 PM by Kenyatta Luna    Review of patient's allergies indicates:  No Known Allergies Last reviewed on  7/20/2021 4:06 PM by Kenyatta Luna      Tasks added this encounter   2/7/2022 - Refill Call (Auto Added)   Tasks due within next 3 months   No tasks due.     [facility]  [address] [facility] [address]  Phone: [phone]  Fax: [phone]

## 2022-01-25 ENCOUNTER — PATIENT MESSAGE (OUTPATIENT)
Dept: INFECTIOUS DISEASES | Facility: CLINIC | Age: 47
End: 2022-01-25
Payer: MEDICARE

## 2022-01-25 RX ORDER — ALBUTEROL SULFATE 90 UG/1
2 AEROSOL, METERED RESPIRATORY (INHALATION) EVERY 6 HOURS PRN
Qty: 18 G | Refills: 0 | Status: SHIPPED | OUTPATIENT
Start: 2022-01-25 | End: 2022-01-25

## 2022-01-27 ENCOUNTER — PATIENT MESSAGE (OUTPATIENT)
Dept: INFECTIOUS DISEASES | Facility: CLINIC | Age: 47
End: 2022-01-27
Payer: MEDICARE

## 2022-02-08 ENCOUNTER — SPECIALTY PHARMACY (OUTPATIENT)
Dept: PHARMACY | Facility: CLINIC | Age: 47
End: 2022-02-08
Payer: MEDICARE

## 2022-02-08 NOTE — TELEPHONE ENCOUNTER
Specialty Pharmacy - Refill Coordination    Specialty Medication Orders Linked to Encounter    Flowsheet Row Most Recent Value   Medication #1 efavirenz-emtrictabine-tenofovir 600-200-300 mg (ATRIPLA) 600-200-300 mg Tab (Order#[id number], Rx#[id number])          Refill Questions - Documented Responses    Flowsheet Row Most Recent Value   Patient Availability and HIPAA Verification    Does patient want to proceed with activity? Yes   HIPAA/medical authority confirmed? Yes   Relationship to patient of person spoken to? Self   Refill Screening Questions    Changes to allergies? No   Changes to medications? No   New conditions since last clinic visit? No   Unplanned office visit, urgent care, ED, or hospital admission in the last 4 weeks? No   How does patient/caregiver feel medication is working? Good   Financial problems or insurance changes? No   How many doses of your specialty medications were missed in the last 4 weeks? 0   Would patient like to speak to a pharmacist? No   When does the patient need to receive the medication? 02/14/22   Refill Delivery Questions    How will the patient receive the medication? Delivery Ashli   When does the patient need to receive the medication? 02/14/22   Shipping Address Home   Address in [facility] confirmed and updated if neccessary? Yes   Expected Copay ($) 0   Is the patient able to afford the medication copay? Yes   Payment Method zero copay   Days supply of Refill 30   Supplies needed? No supplies needed   Refill activity completed? Yes   Refill activity plan Refill scheduled   Shipment/Pickup Date: 02/14/22          Current Outpatient Medications   Medication Sig    albuterol (PROVENTIL/VENTOLIN HFA) 90 mcg/actuation inhaler INHALE 2 PUFFS INTO THE LUNGS EVERY 6 HOURS AS NEEDED FOR WHEEZING    efavirenz-emtrictabine-tenofovir 600-200-300 mg (ATRIPLA) 600-200-300 mg Tab Take 1 tablet by mouth every evening.    HYDROcodone-acetaminophen (NORCO) 7.5-325 mg per  "tablet Take 1 tablet by mouth every 6 (six) hours as needed.    insulin syringe-needle U-100 1 mL 30 gauge X 7/16" Syrg Use as directed    lisinopriL 10 MG tablet Take 1 tablet (10 mg total) by mouth once daily.    oxyCODONE-acetaminophen (PERCOCET) 5-325 mg per tablet Take 1 tablet by mouth every 6 (six) hours as needed for Pain.    SEROSTIM 6 mg SolR INJECT UNDER THE SKIN EVERY DAY   Last reviewed on 7/20/2021  4:06 PM by Kenyatta Luna    Review of patient's allergies indicates:  No Known Allergies Last reviewed on  1/25/2022 9:38 AM by Smooth Millan      Tasks added this encounter   3/9/2022 - Refill Call (Auto Added)   Tasks due within next 3 months   No tasks due.     [facility]  [address] [facility] [address]  Phone: [phone]  Fax: [phone]      "

## 2022-03-08 ENCOUNTER — PATIENT MESSAGE (OUTPATIENT)
Dept: INFECTIOUS DISEASES | Facility: CLINIC | Age: 47
End: 2022-03-08
Payer: MEDICARE

## 2022-03-09 ENCOUNTER — SPECIALTY PHARMACY (OUTPATIENT)
Dept: PHARMACY | Facility: CLINIC | Age: 47
End: 2022-03-09
Payer: MEDICARE

## 2022-03-09 NOTE — TELEPHONE ENCOUNTER
Specialty Pharmacy - Refill Coordination    Specialty Medication Orders Linked to Encounter    Flowsheet Row Most Recent Value   Medication #1 efavirenz-emtrictabine-tenofovir 600-200-300 mg (ATRIPLA) 600-200-300 mg Tab (Order#[id number], Rx#[id number])          Refill Questions - Documented Responses    Flowsheet Row Most Recent Value   Patient Availability and HIPAA Verification    Does patient want to proceed with activity? Yes   HIPAA/medical authority confirmed? Yes   Relationship to patient of person spoken to? Self   Refill Screening Questions    Changes to allergies? No   Changes to medications? No   New conditions since last clinic visit? No   Unplanned office visit, urgent care, ED, or hospital admission in the last 4 weeks? No   How does patient/caregiver feel medication is working? Good   Financial problems or insurance changes? No   How many doses of your specialty medications were missed in the last 4 weeks? 0   Would patient like to speak to a pharmacist? No   When does the patient need to receive the medication? 03/14/22   Refill Delivery Questions    How will the patient receive the medication? Delivery Ashli   When does the patient need to receive the medication? 03/14/22   Shipping Address Home   Address in [facility] confirmed and updated if neccessary? Yes   Expected Copay ($) 0   Is the patient able to afford the medication copay? Yes   Payment Method zero copay   Days supply of Refill 30   Supplies needed? No supplies needed   Refill activity completed? Yes   Refill activity plan Refill scheduled   Shipment/Pickup Date: 03/14/22          Current Outpatient Medications   Medication Sig    albuterol (PROVENTIL/VENTOLIN HFA) 90 mcg/actuation inhaler INHALE 2 PUFFS INTO THE LUNGS EVERY 6 HOURS AS NEEDED FOR WHEEZING    efavirenz-emtrictabine-tenofovir 600-200-300 mg (ATRIPLA) 600-200-300 mg Tab Take 1 tablet by mouth every evening.    HYDROcodone-acetaminophen (NORCO) 7.5-325 mg per  "tablet Take 1 tablet by mouth every 6 (six) hours as needed.    insulin syringe-needle U-100 1 mL 30 gauge X 7/16" Syrg Use as directed    lisinopriL 10 MG tablet Take 1 tablet (10 mg total) by mouth once daily.    oxyCODONE-acetaminophen (PERCOCET) 5-325 mg per tablet Take 1 tablet by mouth every 6 (six) hours as needed for Pain.    SEROSTIM 6 mg SolR INJECT UNDER THE SKIN EVERY DAY   Last reviewed on 7/20/2021  4:06 PM by Kenyatta Luna    Review of patient's allergies indicates:  No Known Allergies Last reviewed on  1/25/2022 9:38 AM by Smooth Millan      Tasks added this encounter   4/6/2022 - Refill Call (Auto Added)   Tasks due within next 3 months   No tasks due.     [facility] - [facility]  [address] [facility] [address]  Phone: [phone]  Fax: [phone]      "

## 2022-03-11 DIAGNOSIS — M25.519 SHOULDER PAIN, UNSPECIFIED CHRONICITY, UNSPECIFIED LATERALITY: Primary | ICD-10-CM

## 2022-04-05 ENCOUNTER — PATIENT MESSAGE (OUTPATIENT)
Dept: INFECTIOUS DISEASES | Facility: CLINIC | Age: 47
End: 2022-04-05
Payer: MEDICARE

## 2022-04-06 ENCOUNTER — PATIENT MESSAGE (OUTPATIENT)
Dept: PHARMACY | Facility: CLINIC | Age: 47
End: 2022-04-06
Payer: MEDICARE

## 2022-04-06 ENCOUNTER — SPECIALTY PHARMACY (OUTPATIENT)
Dept: PHARMACY | Facility: CLINIC | Age: 47
End: 2022-04-06
Payer: MEDICARE

## 2022-04-09 ENCOUNTER — PATIENT MESSAGE (OUTPATIENT)
Dept: PHARMACY | Facility: CLINIC | Age: 47
End: 2022-04-09
Payer: MEDICARE

## 2022-04-18 ENCOUNTER — PATIENT MESSAGE (OUTPATIENT)
Dept: ADMINISTRATIVE | Facility: OTHER | Age: 47
End: 2022-04-18
Payer: MEDICARE

## 2022-04-18 ENCOUNTER — PATIENT MESSAGE (OUTPATIENT)
Dept: PHARMACY | Facility: CLINIC | Age: 47
End: 2022-04-18
Payer: MEDICARE

## 2022-04-19 NOTE — TELEPHONE ENCOUNTER
Specialty Pharmacy - Refill Coordination    Specialty Medication Orders Linked to Encounter    Flowsheet Row Most Recent Value   Medication #1 efavirenz-emtrictabine-tenofovir 600-200-300 mg (ATRIPLA) 600-200-300 mg Tab (Order#[id number], Rx#[id number])          Refill Questions - Documented Responses    Flowsheet Row Most Recent Value   Patient Availability and HIPAA Verification    Does patient want to proceed with activity? Yes   HIPAA/medical authority confirmed? Yes   Relationship to patient of person spoken to? Self   Refill Screening Questions    Changes to allergies? No   Changes to medications? No   New conditions since last clinic visit? No   Unplanned office visit, urgent care, ED, or hospital admission in the last 4 weeks? No   How does patient/caregiver feel medication is working? Good   Financial problems or insurance changes? No   How many doses of your specialty medications were missed in the last 4 weeks? 0   Would patient like to speak to a pharmacist? No   When does the patient need to receive the medication? 04/20/22   Refill Delivery Questions    How will the patient receive the medication? Delivery Ashli   When does the patient need to receive the medication? 04/20/22   Shipping Address Home   Address in [facility] confirmed and updated if neccessary? Yes   Expected Copay ($) 0   Is the patient able to afford the medication copay? Yes   Payment Method zero copay   Days supply of Refill 30   Supplies needed? No supplies needed   Refill activity completed? Yes   Refill activity plan Refill scheduled   Shipment/Pickup Date: 04/20/22          Current Outpatient Medications   Medication Sig    albuterol (PROVENTIL/VENTOLIN HFA) 90 mcg/actuation inhaler INHALE 2 PUFFS INTO THE LUNGS EVERY 6 HOURS AS NEEDED FOR WHEEZING    efavirenz-emtrictabine-tenofovir 600-200-300 mg (ATRIPLA) 600-200-300 mg Tab Take 1 tablet by mouth every evening.    HYDROcodone-acetaminophen (NORCO) 7.5-325 mg per  "tablet Take 1 tablet by mouth every 6 (six) hours as needed.    insulin syringe-needle U-100 1 mL 30 gauge X 7/16" Syrg Use as directed    lisinopriL 10 MG tablet Take 1 tablet (10 mg total) by mouth once daily.    oxyCODONE-acetaminophen (PERCOCET) 5-325 mg per tablet Take 1 tablet by mouth every 6 (six) hours as needed for Pain.    SEROSTIM 6 mg SolR INJECT UNDER THE SKIN EVERY DAY   Last reviewed on 7/20/2021  4:06 PM by Kenyatta Luna    Review of patient's allergies indicates:  No Known Allergies Last reviewed on  1/25/2022 9:38 AM by Smooth Millan      Tasks added this encounter   5/13/2022 - Refill Call (Auto Added)   Tasks due within next 3 months   No tasks due.     Meaghan Schwartz [facility]  [address] [facility] [address]  Phone: [phone]  Fax: [phone]      "

## 2022-04-29 ENCOUNTER — PATIENT MESSAGE (OUTPATIENT)
Dept: INFECTIOUS DISEASES | Facility: CLINIC | Age: 47
End: 2022-04-29
Payer: MEDICARE

## 2022-05-04 ENCOUNTER — PATIENT MESSAGE (OUTPATIENT)
Dept: INFECTIOUS DISEASES | Facility: CLINIC | Age: 47
End: 2022-05-04
Payer: MEDICARE

## 2022-05-12 ENCOUNTER — LAB VISIT (OUTPATIENT)
Dept: LAB | Facility: HOSPITAL | Age: 47
End: 2022-05-12
Attending: INTERNAL MEDICINE
Payer: MEDICARE

## 2022-05-12 ENCOUNTER — OFFICE VISIT (OUTPATIENT)
Dept: INFECTIOUS DISEASES | Facility: CLINIC | Age: 47
End: 2022-05-12
Payer: MEDICARE

## 2022-05-12 VITALS
HEART RATE: 94 BPM | BODY MASS INDEX: 27.65 KG/M2 | TEMPERATURE: 98 F | SYSTOLIC BLOOD PRESSURE: 138 MMHG | DIASTOLIC BLOOD PRESSURE: 76 MMHG | HEIGHT: 76 IN | WEIGHT: 227.06 LBS

## 2022-05-12 DIAGNOSIS — N50.89 ULCERS OF GENITAL ORGAN IN MALE: ICD-10-CM

## 2022-05-12 DIAGNOSIS — Z21 HIV POSITIVE: Primary | ICD-10-CM

## 2022-05-12 DIAGNOSIS — Z21 HIV POSITIVE: ICD-10-CM

## 2022-05-12 LAB
ALBUMIN SERPL BCP-MCNC: 3.9 G/DL (ref 3.5–5.2)
ALP SERPL-CCNC: 148 U/L (ref 55–135)
ALT SERPL W/O P-5'-P-CCNC: 34 U/L (ref 10–44)
ANION GAP SERPL CALC-SCNC: 12 MMOL/L (ref 8–16)
AST SERPL-CCNC: 32 U/L (ref 10–40)
BASOPHILS # BLD AUTO: 0.01 K/UL (ref 0–0.2)
BASOPHILS NFR BLD: 0.2 % (ref 0–1.9)
BILIRUB SERPL-MCNC: 0.4 MG/DL (ref 0.1–1)
BUN SERPL-MCNC: 12 MG/DL (ref 6–20)
CALCIUM SERPL-MCNC: 10.1 MG/DL (ref 8.7–10.5)
CHLORIDE SERPL-SCNC: 102 MMOL/L (ref 95–110)
CO2 SERPL-SCNC: 28 MMOL/L (ref 23–29)
CREAT SERPL-MCNC: 1 MG/DL (ref 0.5–1.4)
DIFFERENTIAL METHOD: ABNORMAL
EOSINOPHIL # BLD AUTO: 0.1 K/UL (ref 0–0.5)
EOSINOPHIL NFR BLD: 0.9 % (ref 0–8)
ERYTHROCYTE [DISTWIDTH] IN BLOOD BY AUTOMATED COUNT: 13.3 % (ref 11.5–14.5)
EST. GFR  (AFRICAN AMERICAN): >60 ML/MIN/1.73 M^2
EST. GFR  (NON AFRICAN AMERICAN): >60 ML/MIN/1.73 M^2
GLUCOSE SERPL-MCNC: 103 MG/DL (ref 70–110)
HCT VFR BLD AUTO: 43 % (ref 40–54)
HGB BLD-MCNC: 14.2 G/DL (ref 14–18)
IMM GRANULOCYTES # BLD AUTO: 0.02 K/UL (ref 0–0.04)
IMM GRANULOCYTES NFR BLD AUTO: 0.3 % (ref 0–0.5)
LYMPHOCYTES # BLD AUTO: 2.4 K/UL (ref 1–4.8)
LYMPHOCYTES NFR BLD: 37.1 % (ref 18–48)
MCH RBC QN AUTO: 31.1 PG (ref 27–31)
MCHC RBC AUTO-ENTMCNC: 33 G/DL (ref 32–36)
MCV RBC AUTO: 94 FL (ref 82–98)
MONOCYTES # BLD AUTO: 0.8 K/UL (ref 0.3–1)
MONOCYTES NFR BLD: 12.2 % (ref 4–15)
NEUTROPHILS # BLD AUTO: 3.1 K/UL (ref 1.8–7.7)
NEUTROPHILS NFR BLD: 49.3 % (ref 38–73)
NRBC BLD-RTO: 0 /100 WBC
PLATELET # BLD AUTO: 241 K/UL (ref 150–450)
PMV BLD AUTO: 9.2 FL (ref 9.2–12.9)
POTASSIUM SERPL-SCNC: 4.1 MMOL/L (ref 3.5–5.1)
PROT SERPL-MCNC: 8 G/DL (ref 6–8.4)
RBC # BLD AUTO: 4.56 M/UL (ref 4.6–6.2)
SODIUM SERPL-SCNC: 142 MMOL/L (ref 136–145)
WBC # BLD AUTO: 6.33 K/UL (ref 3.9–12.7)

## 2022-05-12 PROCEDURE — 86696 HERPES SIMPLEX TYPE 2 TEST: CPT | Performed by: INTERNAL MEDICINE

## 2022-05-12 PROCEDURE — 87070 CULTURE OTHR SPECIMN AEROBIC: CPT | Performed by: INTERNAL MEDICINE

## 2022-05-12 PROCEDURE — 86361 T CELL ABSOLUTE COUNT: CPT | Performed by: INTERNAL MEDICINE

## 2022-05-12 PROCEDURE — 99999 PR PBB SHADOW E&M-EST. PATIENT-LVL III: CPT | Mod: PBBFAC,,, | Performed by: INTERNAL MEDICINE

## 2022-05-12 PROCEDURE — 36415 COLL VENOUS BLD VENIPUNCTURE: CPT | Performed by: INTERNAL MEDICINE

## 2022-05-12 PROCEDURE — 99999 PR PBB SHADOW E&M-EST. PATIENT-LVL III: ICD-10-PCS | Mod: PBBFAC,,, | Performed by: INTERNAL MEDICINE

## 2022-05-12 PROCEDURE — 80053 COMPREHEN METABOLIC PANEL: CPT | Performed by: INTERNAL MEDICINE

## 2022-05-12 PROCEDURE — 87536 HIV-1 QUANT&REVRSE TRNSCRPJ: CPT | Performed by: INTERNAL MEDICINE

## 2022-05-12 PROCEDURE — 85025 COMPLETE CBC W/AUTO DIFF WBC: CPT | Performed by: INTERNAL MEDICINE

## 2022-05-12 PROCEDURE — 99214 OFFICE O/P EST MOD 30 MIN: CPT | Mod: S$PBB,,, | Performed by: INTERNAL MEDICINE

## 2022-05-12 PROCEDURE — 86592 SYPHILIS TEST NON-TREP QUAL: CPT | Performed by: INTERNAL MEDICINE

## 2022-05-12 PROCEDURE — 86695 HERPES SIMPLEX TYPE 1 TEST: CPT | Performed by: INTERNAL MEDICINE

## 2022-05-12 PROCEDURE — 99213 OFFICE O/P EST LOW 20 MIN: CPT | Mod: PBBFAC | Performed by: INTERNAL MEDICINE

## 2022-05-12 PROCEDURE — 99214 PR OFFICE/OUTPT VISIT, EST, LEVL IV, 30-39 MIN: ICD-10-PCS | Mod: S$PBB,,, | Performed by: INTERNAL MEDICINE

## 2022-05-12 RX ORDER — SULFAMETHOXAZOLE AND TRIMETHOPRIM 800; 160 MG/1; MG/1
1 TABLET ORAL 2 TIMES DAILY
Qty: 10 TABLET | Refills: 2 | Status: SHIPPED | OUTPATIENT
Start: 2022-05-12 | End: 2022-05-17

## 2022-05-12 NOTE — PROGRESS NOTES
Subjective:      Patient ID: Moisés Koehler is a 46 y.o. male.    Chief Complaint:HIV Positive/AIDS      History of Present Illness    Patient is doing well. Shoulder pain continues - wants orthopedic referral for evaluation.   Has had ulcer on foreskin for the past 3 weeks. States that he has current female partner. States that he was wearing condom when noticed irritation. Has not had unprotected vaginal sex with this partner and is monogamous. Has had some oral sex with her. No HSV lesions on current partner.    Wound has not healed. No fevers or chills. No painless ulcers. One small ulcer on glans - seems to be resolving, one on foreskin is not responding to conservative management.     Review of Systems   Constitutional: Negative for chills, decreased appetite, fever, malaise/fatigue, night sweats, weight gain and weight loss.   HENT: Negative for congestion, ear pain, hearing loss, hoarse voice, sore throat and tinnitus.    Eyes: Negative for blurred vision, redness and visual disturbance.   Cardiovascular: Negative for chest pain, leg swelling and palpitations.   Respiratory: Negative for cough, hemoptysis, shortness of breath, sputum production and wheezing.    Hematologic/Lymphatic: Negative for adenopathy. Does not bruise/bleed easily.   Skin: Negative for dry skin, itching, rash and suspicious lesions.   Musculoskeletal: Negative for back pain, joint pain, myalgias and neck pain.   Gastrointestinal: Negative for abdominal pain, constipation, diarrhea, heartburn, nausea and vomiting.   Genitourinary: Negative for dysuria, flank pain, frequency, hematuria, hesitancy and urgency.   Neurological: Negative for dizziness, headaches, numbness, paresthesias and weakness.   Psychiatric/Behavioral: Negative for depression and memory loss. The patient does not have insomnia and is not nervous/anxious.      Objective:   Physical Exam  Vitals and nursing note reviewed.   Constitutional:       Appearance: Normal  appearance.   Genitourinary:     Penis: Circumcised.        Musculoskeletal:         General: No swelling. Normal range of motion.   Lymphadenopathy:      Lower Body: No right inguinal adenopathy. No left inguinal adenopathy.   Skin:     General: Skin is warm and dry.      Findings: No erythema.   Neurological:      Mental Status: He is alert.       Assessment:       1. HIV positive    2. Ulcers of genital organ in male          Plan:       Ulcer cultured for bacteria and viruses. Most likely related to friction. Will start bactrim and gentian violet. Will check HIV parameters today. Referral to orthopedics for shoulder pain and repeat evaluation. I spent over 30 minutes on this encounter.

## 2022-05-13 ENCOUNTER — SPECIALTY PHARMACY (OUTPATIENT)
Dept: PHARMACY | Facility: CLINIC | Age: 47
End: 2022-05-13
Payer: MEDICARE

## 2022-05-13 ENCOUNTER — PATIENT MESSAGE (OUTPATIENT)
Dept: PHARMACY | Facility: CLINIC | Age: 47
End: 2022-05-13
Payer: MEDICARE

## 2022-05-13 ENCOUNTER — PATIENT MESSAGE (OUTPATIENT)
Dept: ORTHOPEDICS | Facility: CLINIC | Age: 47
End: 2022-05-13
Payer: MEDICARE

## 2022-05-13 LAB
CD3+CD4+ CELLS # BLD: 870 CELLS/UL (ref 300–1400)
CD3+CD4+ CELLS NFR BLD: 37 % (ref 28–57)
RPR SER QL: NORMAL

## 2022-05-13 NOTE — TELEPHONE ENCOUNTER
Specialty Pharmacy - Refill Coordination    Specialty Medication Orders Linked to Encounter    Flowsheet Row Most Recent Value   Medication #1 efavirenz-emtrictabine-tenofovir 600-200-300 mg (ATRIPLA) 600-200-300 mg Tab (Order#[id number], Rx#[id number])          Refill Questions - Documented Responses    Flowsheet Row Most Recent Value   Patient Availability and HIPAA Verification    Does patient want to proceed with activity? Yes   HIPAA/medical authority confirmed? Yes   Relationship to patient of person spoken to? Self   Refill Screening Questions    Changes to allergies? No   Changes to medications? No   New conditions since last clinic visit? No   Unplanned office visit, urgent care, ED, or hospital admission in the last 4 weeks? No   How does patient/caregiver feel medication is working? Good   Financial problems or insurance changes? No   How many doses of your specialty medications were missed in the last 4 weeks? 0   Would patient like to speak to a pharmacist? No   When does the patient need to receive the medication? 05/18/22   Refill Delivery Questions    How will the patient receive the medication? Delivery Ashli   When does the patient need to receive the medication? 05/18/22   Shipping Address Home   Address in [facility] confirmed and updated if neccessary? Yes   Expected Copay ($) 0   Is the patient able to afford the medication copay? Yes   Payment Method zero copay   Days supply of Refill 30   Supplies needed? No supplies needed   Refill activity completed? Yes   Refill activity plan Refill scheduled   Shipment/Pickup Date: 05/18/22          Current Outpatient Medications   Medication Sig    albuterol (PROVENTIL/VENTOLIN HFA) 90 mcg/actuation inhaler INHALE 2 PUFFS INTO THE LUNGS EVERY 6 HOURS AS NEEDED FOR WHEEZING    efavirenz-emtrictabine-tenofovir 600-200-300 mg (ATRIPLA) 600-200-300 mg Tab Take 1 tablet by mouth every evening.    HYDROcodone-acetaminophen (NORCO) 7.5-325 mg per  "tablet Take 1 tablet by mouth every 6 (six) hours as needed.    insulin syringe-needle U-100 1 mL 30 gauge X 7/16" Syrg Use as directed    lisinopriL 10 MG tablet Take 1 tablet (10 mg total) by mouth once daily.    oxyCODONE-acetaminophen (PERCOCET) 5-325 mg per tablet Take 1 tablet by mouth every 6 (six) hours as needed for Pain.    SEROSTIM 6 mg SolR INJECT UNDER THE SKIN EVERY DAY    sulfamethoxazole-trimethoprim 800-160mg (BACTRIM DS) 800-160 mg Tab Take 1 tablet by mouth 2 (two) times daily. for 5 days   Last reviewed on 5/12/2022  2:21 PM by Smooth Millan MD    Review of patient's allergies indicates:  No Known Allergies Last reviewed on  5/12/2022 1:49 PM by Valerie Baron      Tasks added this encounter   6/10/2022 - Refill Call (Auto Added)   Tasks due within next 3 months   No tasks due.     Meaghan Schwartz [facility]  [address] [facility] [address]  Phone: [phone]  Fax: [phone]      "

## 2022-05-14 LAB
BACTERIA SPEC AEROBE CULT: NORMAL
HIV1 RNA # PLAS NAA DL=20: NORMAL COPIES/ML
HSV1 IGG SERPL QL IA: NEGATIVE
HSV2 IGG SERPL QL IA: POSITIVE

## 2022-05-15 LAB
HSV1 DNA SPEC QL NAA+PROBE: NEGATIVE
HSV2 DNA SPEC QL NAA+PROBE: NEGATIVE
SPECIMEN SOURCE: NORMAL

## 2022-05-17 ENCOUNTER — PATIENT MESSAGE (OUTPATIENT)
Dept: INFECTIOUS DISEASES | Facility: CLINIC | Age: 47
End: 2022-05-17
Payer: MEDICARE

## 2022-05-20 ENCOUNTER — PATIENT MESSAGE (OUTPATIENT)
Dept: INFECTIOUS DISEASES | Facility: CLINIC | Age: 47
End: 2022-05-20
Payer: MEDICARE

## 2022-05-21 ENCOUNTER — PATIENT MESSAGE (OUTPATIENT)
Dept: INFECTIOUS DISEASES | Facility: CLINIC | Age: 47
End: 2022-05-21
Payer: MEDICARE

## 2022-05-25 ENCOUNTER — PATIENT MESSAGE (OUTPATIENT)
Dept: INFECTIOUS DISEASES | Facility: CLINIC | Age: 47
End: 2022-05-25
Payer: MEDICARE

## 2022-06-03 ENCOUNTER — PATIENT MESSAGE (OUTPATIENT)
Dept: INFECTIOUS DISEASES | Facility: CLINIC | Age: 47
End: 2022-06-03
Payer: MEDICARE

## 2022-06-03 ENCOUNTER — OFFICE VISIT (OUTPATIENT)
Dept: UROLOGY | Facility: CLINIC | Age: 47
End: 2022-06-03
Payer: MEDICARE

## 2022-06-03 VITALS
SYSTOLIC BLOOD PRESSURE: 138 MMHG | DIASTOLIC BLOOD PRESSURE: 91 MMHG | BODY MASS INDEX: 27.65 KG/M2 | HEART RATE: 91 BPM | WEIGHT: 227.06 LBS | HEIGHT: 76 IN

## 2022-06-03 DIAGNOSIS — S30.812A ABRASION OF PENIS, INITIAL ENCOUNTER: Primary | ICD-10-CM

## 2022-06-03 PROCEDURE — 99203 OFFICE O/P NEW LOW 30 MIN: CPT | Mod: S$PBB,,, | Performed by: UROLOGY

## 2022-06-03 PROCEDURE — 99203 PR OFFICE/OUTPT VISIT, NEW, LEVL III, 30-44 MIN: ICD-10-PCS | Mod: S$PBB,,, | Performed by: UROLOGY

## 2022-06-03 PROCEDURE — 99999 PR PBB SHADOW E&M-EST. PATIENT-LVL III: CPT | Mod: PBBFAC,,, | Performed by: UROLOGY

## 2022-06-03 PROCEDURE — 99999 PR PBB SHADOW E&M-EST. PATIENT-LVL III: ICD-10-PCS | Mod: PBBFAC,,, | Performed by: UROLOGY

## 2022-06-03 PROCEDURE — 99213 OFFICE O/P EST LOW 20 MIN: CPT | Mod: PBBFAC,PN | Performed by: UROLOGY

## 2022-06-03 RX ORDER — TRIAMCINOLONE ACETONIDE 1 MG/G
CREAM TOPICAL 2 TIMES DAILY
COMMUNITY
Start: 2022-05-21 | End: 2023-06-14

## 2022-06-03 NOTE — PROGRESS NOTES
"Subjective:      Moisés Koehler is a 46 y.o. male who was self-referred for evaluation of penile lesion.      He has had a lesion on his glans for about 1 month. Saw PCP 2 weeks ago and had negative STD workup. States lesion has healed some recently but he is concerned that it isn't fully healed. He has some mild pain associated with it. No discharge at this time. No known h/o STDs.    The following portions of the patient's history were reviewed and updated as appropriate: allergies, current medications, past family history, past medical history, past social history, past surgical history and problem list.    Review of Systems  Constitutional: no fever or chills  ENT: no nasal congestion or sore throat  Respiratory: no cough or shortness of breath  Cardiovascular: no chest pain or palpitations  Gastrointestinal: no nausea or vomiting, tolerating diet  Genitourinary: as per HPI  Hematologic/Lymphatic: no easy bruising or lymphadenopathy  Musculoskeletal: no arthralgias or myalgias  Neurological: no seizures or tremors  Behavioral/Psych: no auditory or visual hallucinations     Objective:   Vitals: BP (!) 138/91 (BP Location: Left arm, Patient Position: Sitting, BP Method: X-Large (Automatic))   Pulse 91   Ht 6' 4" (1.93 m)   Wt 103 kg (227 lb 1.2 oz)   BMI 27.64 kg/m²     Physical Exam   General: alert and oriented, no acute distress  Head: normocephalic, atraumatic  Neck: supple, no lymphadenopathy, normal ROM, no masses  Penis: circumcised; 5mm firm raised lesion on distal shaft with normal pigmentation and no ulceration  Neuro: alert and oriented x3, no gross deficits  Psych: normal judgment and insight, normal mood/affect and non-anxious    Lab Review     Lab Results   Component Value Date    WBC 6.33 05/12/2022    HGB 14.2 05/12/2022    HCT 43.0 05/12/2022    MCV 94 05/12/2022     05/12/2022     Lab Results   Component Value Date    CREATININE 1.0 05/12/2022    BUN 12 05/12/2022     Assessment: "     1. Abrasion of penis, initial encounter        Plan:   1. Reassured that abrasion appears to be healing well and recommended he stop applying any topical medications at this time.  2. FU PRN

## 2022-06-06 ENCOUNTER — SPECIALTY PHARMACY (OUTPATIENT)
Dept: PHARMACY | Facility: CLINIC | Age: 47
End: 2022-06-06
Payer: MEDICARE

## 2022-06-06 DIAGNOSIS — Z21 HIV POSITIVE: Primary | ICD-10-CM

## 2022-06-06 NOTE — TELEPHONE ENCOUNTER
Specialty Pharmacy - Clinical Reassessment    Specialty Medication Orders Linked to Encounter    Flowsheet Row Most Recent Value   Medication #1 efavirenz-emtrictabine-tenofovir 600-200-300 mg (ATRIPLA) 600-200-300 mg Tab (Order#[id number], Rx#[id number])        Patient Diagnosis   Z21 - HIV positive    Specialty clinical pharmacist review completed for an annual review of reassessment. Reviewed the following areas: current med list, reports of adverse effects, adherence and progress towards therapeutic goals.    Recommendations: none at this time.    Tasks added this encounter   3/6/2023 - Clinical - Follow Up Assesement (Annual)   Tasks due within next 3 months   6/10/2022 - Refill Call (Auto Added)     Kim Rubi, PharmD  [facility]  [address] [place] [id number]  Phone: [phone]  Fax: [phone]

## 2022-06-07 ENCOUNTER — PATIENT MESSAGE (OUTPATIENT)
Dept: ORTHOPEDICS | Facility: CLINIC | Age: 47
End: 2022-06-07
Payer: MEDICARE

## 2022-06-07 ENCOUNTER — TELEPHONE (OUTPATIENT)
Dept: ORTHOPEDICS | Facility: CLINIC | Age: 47
End: 2022-06-07
Payer: MEDICARE

## 2022-06-07 DIAGNOSIS — R52 PAIN: Primary | ICD-10-CM

## 2022-06-08 ENCOUNTER — TELEPHONE (OUTPATIENT)
Dept: ORTHOPEDICS | Facility: CLINIC | Age: 47
End: 2022-06-08
Payer: MEDICARE

## 2022-06-08 NOTE — TELEPHONE ENCOUNTER
S/w the patient, scheduled appointment. All questions answered. Patient verbalized understanding and was thankful.     Connie Garcia MS, OTC  Clinical & OR Assistant to Dr. Nikos Bullock  [facility]  [phone]    ----- Message from Xin Barfield MA sent at 6/8/2022  8:50 AM CDT -----  Regarding: Scheduling  Good morning can you please assist pt with getting scheduled with Dr. Bullock for a possible right hand fx . He scheduled his self through the pt portal per hand providers they would like for him to f/u with Dr. Bullock due to seeing him last year.   Thank you

## 2022-06-10 ENCOUNTER — TELEPHONE (OUTPATIENT)
Dept: ORTHOPEDICS | Facility: CLINIC | Age: 47
End: 2022-06-10
Payer: MEDICARE

## 2022-06-10 ENCOUNTER — SPECIALTY PHARMACY (OUTPATIENT)
Dept: PHARMACY | Facility: CLINIC | Age: 47
End: 2022-06-10
Payer: MEDICARE

## 2022-06-10 DIAGNOSIS — M79.641 RIGHT HAND PAIN: Primary | ICD-10-CM

## 2022-06-10 NOTE — TELEPHONE ENCOUNTER
Specialty Pharmacy - Refill Coordination    Specialty Medication Orders Linked to Encounter    Flowsheet Row Most Recent Value   Medication #1 efavirenz-emtrictabine-tenofovir 600-200-300 mg (ATRIPLA) 600-200-300 mg Tab (Order#[id number], Rx#[id number])          Refill Questions - Documented Responses    Flowsheet Row Most Recent Value   Patient Availability and HIPAA Verification    Does patient want to proceed with activity? Yes   HIPAA/medical authority confirmed? Yes   Relationship to patient of person spoken to? Self   Refill Screening Questions    Changes to allergies? No   Changes to medications? No   New conditions since last clinic visit? No   Unplanned office visit, urgent care, ED, or hospital admission in the last 4 weeks? No   How does patient/caregiver feel medication is working? Good   Financial problems or insurance changes? No   How many doses of your specialty medications were missed in the last 4 weeks? 0   Would patient like to speak to a pharmacist? No   When does the patient need to receive the medication? 06/14/22   Refill Delivery Questions    How will the patient receive the medication? Delivery Ashli   When does the patient need to receive the medication? 06/14/22   Shipping Address Home   Address in [facility] confirmed and updated if neccessary? Yes   Expected Copay ($) 0   Is the patient able to afford the medication copay? Yes   Payment Method zero copay   Days supply of Refill 30   Supplies needed? No supplies needed   Refill activity completed? Yes   Refill activity plan Refill scheduled   Shipment/Pickup Date: 06/14/22          Current Outpatient Medications   Medication Sig    albuterol (PROVENTIL/VENTOLIN HFA) 90 mcg/actuation inhaler INHALE 2 PUFFS INTO THE LUNGS EVERY 6 HOURS AS NEEDED FOR WHEEZING    efavirenz-emtrictabine-tenofovir 600-200-300 mg (ATRIPLA) 600-200-300 mg Tab Take 1 tablet by mouth every evening.    HYDROcodone-acetaminophen (NORCO) 7.5-325 mg per  "tablet Take 1 tablet by mouth every 6 (six) hours as needed.    insulin syringe-needle U-100 1 mL 30 gauge X 7/16" Syrg Use as directed    lisinopriL 10 MG tablet Take 1 tablet (10 mg total) by mouth once daily.    SEROSTIM 6 mg SolR INJECT UNDER THE SKIN EVERY DAY    triamcinolone acetonide 0.1% (KENALOG) 0.1 % cream Apply topically 2 (two) times daily.   Last reviewed on 6/3/2022  2:08 PM by Hermilo Gallegos MD    Review of patient's allergies indicates:  No Known Allergies Last reviewed on  6/3/2022 2:08 PM by Hermilo Gallegos      Tasks added this encounter   7/7/2022 - Refill Call (Auto Added)   Tasks due within next 3 months   No tasks due.     Meaghan Schwartz [facility]  [address] [facility] [address]  Phone: [phone]  Fax: [phone]      "

## 2022-06-10 NOTE — TELEPHONE ENCOUNTER
LVM for  the patient. Informed of X-rays scheduled prior to appointment.       Isabelle Mendoza MA  Medical Assistant to Dr. Ross Dunbar [facility]

## 2022-06-13 ENCOUNTER — HOSPITAL ENCOUNTER (OUTPATIENT)
Dept: RADIOLOGY | Facility: OTHER | Age: 47
Discharge: HOME | End: 2022-06-13
Attending: ORTHOPAEDIC SURGERY
Payer: MEDICARE

## 2022-06-13 ENCOUNTER — PATIENT MESSAGE (OUTPATIENT)
Dept: ORTHOPEDICS | Facility: CLINIC | Age: 47
End: 2022-06-13

## 2022-06-13 ENCOUNTER — OFFICE VISIT (OUTPATIENT)
Dept: ORTHOPEDICS | Facility: CLINIC | Age: 47
End: 2022-06-13
Payer: MEDICARE

## 2022-06-13 DIAGNOSIS — M79.641 RIGHT HAND PAIN: ICD-10-CM

## 2022-06-13 DIAGNOSIS — S62.001K CLOSED NONDISPLACED FRACTURE OF SCAPHOID OF RIGHT WRIST WITH NONUNION, UNSPECIFIED PORTION OF SCAPHOID, SUBSEQUENT ENCOUNTER: Primary | ICD-10-CM

## 2022-06-13 PROCEDURE — 99999 PR PBB SHADOW E&M-EST. PATIENT-LVL II: CPT | Mod: PBBFAC,,, | Performed by: ORTHOPAEDIC SURGERY

## 2022-06-13 PROCEDURE — 99999 PR PBB SHADOW E&M-EST. PATIENT-LVL II: ICD-10-PCS | Mod: PBBFAC,,, | Performed by: ORTHOPAEDIC SURGERY

## 2022-06-13 PROCEDURE — 73130 XR HAND COMPLETE 3 VIEW RIGHT: ICD-10-PCS | Mod: 26,RT,, | Performed by: RADIOLOGY

## 2022-06-13 PROCEDURE — 99214 PR OFFICE/OUTPT VISIT, EST, LEVL IV, 30-39 MIN: ICD-10-PCS | Mod: S$PBB,,, | Performed by: ORTHOPAEDIC SURGERY

## 2022-06-13 PROCEDURE — 73130 X-RAY EXAM OF HAND: CPT | Mod: TC,FY,RT

## 2022-06-13 PROCEDURE — 99212 OFFICE O/P EST SF 10 MIN: CPT | Mod: PBBFAC | Performed by: ORTHOPAEDIC SURGERY

## 2022-06-13 PROCEDURE — 73130 X-RAY EXAM OF HAND: CPT | Mod: 26,RT,, | Performed by: RADIOLOGY

## 2022-06-13 PROCEDURE — 99214 OFFICE O/P EST MOD 30 MIN: CPT | Mod: S$PBB,,, | Performed by: ORTHOPAEDIC SURGERY

## 2022-06-13 NOTE — PROGRESS NOTES
[facility]  History & Physical  Orthopedics    SUBJECTIVE:      Chief Complaint: Right Hand Pain     Referring Provider: No ref. provider found     History of Present Illness:  Patient is a 46 y.o. right hand dominant male who presents today with complaints of right hand pain and weakness. He is s/p left small finger ORIF with volar plate repair and central slip lengthening in Jan 2021.     The patient is a/an [employer].    Onset of symptoms/DOI was Months ago.    Symptoms are aggravated by movement.    Symptoms are alleviated by rest.    Symptoms consist of pain.    The patient rates their pain as a 5/10.    Attempted treatment(s) and/or interventions include activity modifications, rest, activity modification.     The patient denies any fevers, chills, N/V, D/C and presents for evaluation.       Past Medical History:   Diagnosis Date    AC joint dislocation     Arthritis     GERD (gastroesophageal reflux disease) 2008    HIV (human immunodeficiency virus infection)     Hypertension      Past Surgical History:   Procedure Laterality Date    ARTHROSCOPIC DEBRIDEMENT OF SHOULDER Left 1/14/2019    Procedure: DEBRIDEMENT SLAP, SHOULDER, ARTHROSCOPIC;  Surgeon: Rakesh العلي MD;  Location: [facility];  Service: Orthopedics;  Laterality: Left;  regional w/o catheter     ARTHROSCOPY OF SHOULDER WITH DECOMPRESSION OF SUBACROMIAL SPACE Left 1/14/2019    Procedure: ARTHROSCOPY, SHOULDER, WITH SUBACROMIAL DECOMPRESSION;  Surgeon: Rakesh العلي MD;  Location: [facility];  Service: Orthopedics;  Laterality: Left;    COLONOSCOPY N/A 12/13/2018    Procedure: COLONOSCOPY;  Surgeon: Shaniqua Chawla MD;  Location: [address]);  Service: Endoscopy;  Laterality: N/A;  schedule as 45 minute case ASAP     COSMETIC SURGERY      FIXATION OF TENDON Left 1/14/2019    Procedure: OPEN BICEPS SUBPECTORALIS TENODESIS;  Surgeon: Rakesh العلي MD;  Location: [facility];  Service:  "Orthopedics;  Laterality: Left;    FRACTURE SURGERY      HERNIA REPAIR      KNEE SURGERY      LYMPH NODE BIOPSY Left 3/27/2019    Procedure: BIOPSY, LYMPH NODE Left Inguinal;  Surgeon: Patrick Lauren MD;  Location: [address];  Service: General;  Laterality: Left;    RECONSTRUCTION OF FINGER Left 1/27/2021    Procedure: RECONSTRUCTION, FINGER - left small finger swan neck reconstruction;  Surgeon: Nikos Bullock MD;  Location: [facility];  Service: Orthopedics;  Laterality: Left;     Review of patient's allergies indicates:  No Known Allergies  Social History     Social History Narrative    Not on file     Family History   Problem Relation Age of Onset    Stomach cancer Mother     Lung cancer Maternal Grandmother     Melanoma Neg Hx     Celiac disease Neg Hx     Cirrhosis Neg Hx     Colon cancer Neg Hx     Colon polyps Neg Hx     Crohn's disease Neg Hx     Cystic fibrosis Neg Hx     Esophageal cancer Neg Hx     Hemochromatosis Neg Hx     Inflammatory bowel disease Neg Hx     Irritable bowel syndrome Neg Hx     Liver cancer Neg Hx     Liver disease Neg Hx     Rectal cancer Neg Hx     Ulcerative colitis Neg Hx     Wilner's disease Neg Hx     Lymphoma Neg Hx     Tuberculosis Neg Hx     Scleroderma Neg Hx     Rheum arthritis Neg Hx     Multiple sclerosis Neg Hx     Lupus Neg Hx     Psoriasis Neg Hx     Skin cancer Neg Hx          Current Outpatient Medications:     albuterol (PROVENTIL/VENTOLIN HFA) 90 mcg/actuation inhaler, INHALE 2 PUFFS INTO THE LUNGS EVERY 6 HOURS AS NEEDED FOR WHEEZING, Disp: 25.5 g, Rfl: 0    efavirenz-emtrictabine-tenofovir 600-200-300 mg (ATRIPLA) 600-200-300 mg Tab, Take 1 tablet by mouth every evening., Disp: 30 tablet, Rfl: 11    insulin syringe-needle U-100 1 mL 30 gauge X 7/16" Syrg, Use as directed, Disp: 100 each, Rfl: 5    SEROSTIM 6 mg SolR, INJECT UNDER THE SKIN EVERY DAY, Disp: 28 each, Rfl: 11    HYDROcodone-acetaminophen (NORCO) 7.5-325 mg " per tablet, Take 1 tablet by mouth every 6 (six) hours as needed. (Patient not taking: Reported on 6/13/2022), Disp: 28 tablet, Rfl: 0    lisinopriL 10 MG tablet, Take 1 tablet (10 mg total) by mouth once daily., Disp: 30 tablet, Rfl: 11    triamcinolone acetonide 0.1% (KENALOG) 0.1 % cream, Apply topically 2 (two) times daily., Disp: , Rfl:       Review of Systems:  As per HPI otherwise noncontributory    OBJECTIVE:      Vital Signs (Most Recent):  There were no vitals filed for this visit.  There is no height or weight on file to calculate BMI.      Physical Exam:  Constitutional: The patient appears well-developed and well-nourished. No distress.   Skin: No lesions appreciated  Head: Normocephalic and atraumatic.   Nose: Nose normal.   Ears: No deformities seen  Eyes: Conjunctivae and EOM are normal.   Neck: No tracheal deviation present.   Cardiovascular: Normal rate and intact distal pulses.    Pulmonary/Chest: Effort normal. No respiratory distress.   Abdominal: There is no guarding.   Neurological: The patient is alert.   Psychiatric: The patient has a normal mood and affect.     Right Hand/Wrist Examination:    Observation/Inspection:  Swelling  none    Deformity  none  Discoloration  none     Scars   none    Atrophy  none    HAND/WRIST EXAMINATION:  Finkelstein's Test   Neg  WHAT Test    Neg  Snuff box tenderness   Pos  Bess's Test    Neg  Hook of Hamate Tenderness  Neg  CMC grind    Pos  Circumduction test   Neg    Neurovascular Exam:  Digits WWP, brisk CR < 3s throughout  NVI motor/LTS to M/R/U nerves, radial pulse 2+  Tinel's Test - Carpal Tunnel  Neg  Tinel's Test - Cubital Tunnel  Neg  Phalen's Test    Neg  Median Nerve Compression Test Neg    ROM hand full, painless    ROM wrist full, pain with hyperflexion     ROM elbow full, painless    Abdomen not guarded  Respirations nonlabored  Perfusion intact    Diagnostic Results:     Imaging - I independently viewed the patient's imaging as well as the  radiology report.  Xrays of the patient's Scaphoid fracture non-union in the middle third.     EMG - none     ASSESSMENT/PLAN:      46 y.o. yo male with Right scaphoid fracture nonunion and also ulnar sided wrist and small finger pain     Plan: The patient and I had a thorough discussion today.  We discussed the working diagnosis as well as several other potential alternative diagnoses.  Treatment options were discussed, both conservative and surgical.  Conservative treatment options would include things such as activity modifications, workplace modifications, a period of rest, oral vs topical OTC and prescription anti-inflammatory medications, occupational therapy, splinting/bracing, immobilization, corticosteroid injections, and others.  Surgical options were discussed as well.     At this time, the patient would like to proceed with a trial of non-op treatment and MRI of the right wrist to further evaluate fracture and ulnar sided pain..    Should the patient's symptoms worsen, persist, or fail to improve they should return for reevaluation and I would be happy to see them back anytime.     Please be aware that this note has been generated with the assistance of [employer] voice-to-text.  Please excuse any spelling or grammatical errors.    Thank you for choosing Dr. Nikos Bullock for your orthopedic hand and upper extremity care. It is our goal to provide you with exceptional care that will help keep you healthy, active, and get you back in the game.     If you felt that you received exemplary care today, please consider leaving feedback for Dr. Bullock on [employer] at [email or web address].    Please do not hesitate to reach out to us via email, phone, or MyChart with any questions, concerns, or feedback.

## 2022-06-29 ENCOUNTER — HOSPITAL ENCOUNTER (OUTPATIENT)
Dept: RADIOLOGY | Facility: HOSPITAL | Age: 47
Discharge: HOME | End: 2022-06-29
Attending: STUDENT IN AN ORGANIZED HEALTH CARE EDUCATION/TRAINING PROGRAM
Payer: MEDICARE

## 2022-06-29 DIAGNOSIS — S62.001K CLOSED NONDISPLACED FRACTURE OF SCAPHOID OF RIGHT WRIST WITH NONUNION, UNSPECIFIED PORTION OF SCAPHOID, SUBSEQUENT ENCOUNTER: ICD-10-CM

## 2022-06-29 PROCEDURE — 73221 MRI JOINT UPR EXTREM W/O DYE: CPT | Mod: TC,RT

## 2022-06-29 PROCEDURE — 73221 MRI JOINT UPR EXTREM W/O DYE: CPT | Mod: 26,RT,, | Performed by: RADIOLOGY

## 2022-06-29 PROCEDURE — 73221 MRI WRIST WITHOUT CONTRAST RIGHT: ICD-10-PCS | Mod: 26,RT,, | Performed by: RADIOLOGY

## 2022-07-07 ENCOUNTER — PATIENT MESSAGE (OUTPATIENT)
Dept: PHARMACY | Facility: CLINIC | Age: 47
End: 2022-07-07
Payer: MEDICARE

## 2022-07-07 ENCOUNTER — SPECIALTY PHARMACY (OUTPATIENT)
Dept: PHARMACY | Facility: CLINIC | Age: 47
End: 2022-07-07
Payer: MEDICARE

## 2022-07-11 ENCOUNTER — PATIENT MESSAGE (OUTPATIENT)
Dept: ORTHOPEDICS | Facility: CLINIC | Age: 47
End: 2022-07-11

## 2022-07-11 ENCOUNTER — OFFICE VISIT (OUTPATIENT)
Dept: ORTHOPEDICS | Facility: CLINIC | Age: 47
End: 2022-07-11
Payer: MEDICARE

## 2022-07-11 VITALS — BODY MASS INDEX: 27.64 KG/M2 | HEIGHT: 76 IN | WEIGHT: 227 LBS

## 2022-07-11 DIAGNOSIS — S62.001K CLOSED NONDISPLACED FRACTURE OF SCAPHOID OF RIGHT WRIST WITH NONUNION, UNSPECIFIED PORTION OF SCAPHOID, SUBSEQUENT ENCOUNTER: Primary | ICD-10-CM

## 2022-07-11 PROCEDURE — 99213 OFFICE O/P EST LOW 20 MIN: CPT | Mod: PBBFAC | Performed by: ORTHOPAEDIC SURGERY

## 2022-07-11 PROCEDURE — 99213 OFFICE O/P EST LOW 20 MIN: CPT | Mod: S$PBB,,, | Performed by: ORTHOPAEDIC SURGERY

## 2022-07-11 PROCEDURE — 99999 PR PBB SHADOW E&M-EST. PATIENT-LVL III: CPT | Mod: PBBFAC,,, | Performed by: ORTHOPAEDIC SURGERY

## 2022-07-11 PROCEDURE — 99999 PR PBB SHADOW E&M-EST. PATIENT-LVL III: ICD-10-PCS | Mod: PBBFAC,,, | Performed by: ORTHOPAEDIC SURGERY

## 2022-07-11 PROCEDURE — 99213 PR OFFICE/OUTPT VISIT, EST, LEVL III, 20-29 MIN: ICD-10-PCS | Mod: S$PBB,,, | Performed by: ORTHOPAEDIC SURGERY

## 2022-07-11 NOTE — PROGRESS NOTES
[facility]  History & Physical  Orthopedics    SUBJECTIVE:      Chief Complaint: Right Hand Pain     Referring Provider: No ref. provider found     History of Present Illness:  Patient is a 46 y.o. right hand dominant male who presents today with complaints of right hand pain and weakness. He is s/p left small finger ORIF with volar plate repair and central slip lengthening in Jan 2021.     Interval history July 11, 2022:  The patient returns today for re-evaluation.  He was sent for MRI of his right wrist to evaluate SLAC arthritis.  He returns for these results and follow-up.  Fortunately he notes his wrist pain is very symptomatic.  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  No other real complaints today.    The patient is a/an Pro Wrestler.    Onset of symptoms/DOI was Months ago.    Symptoms are aggravated by movement.    Symptoms are alleviated by rest.    Symptoms consist of pain.    The patient rates their pain as a 5/10.    Attempted treatment(s) and/or interventions include activity modifications, rest, activity modification.     The patient denies any fevers, chills, N/V, D/C and presents for evaluation.       Past Medical History:   Diagnosis Date    AC joint dislocation     Arthritis     GERD (gastroesophageal reflux disease) 2008    HIV (human immunodeficiency virus infection)     Hypertension      Past Surgical History:   Procedure Laterality Date    ARTHROSCOPIC DEBRIDEMENT OF SHOULDER Left 1/14/2019    Procedure: DEBRIDEMENT SLAP, SHOULDER, ARTHROSCOPIC;  Surgeon: Rakesh العلي MD;  Location: [facility];  Service: Orthopedics;  Laterality: Left;  regional w/o catheter     ARTHROSCOPY OF SHOULDER WITH DECOMPRESSION OF SUBACROMIAL SPACE Left 1/14/2019    Procedure: ARTHROSCOPY, SHOULDER, WITH SUBACROMIAL DECOMPRESSION;  Surgeon: Rakesh العلي MD;  Location: [facility];  Service: Orthopedics;   Laterality: Left;    COLONOSCOPY N/A 12/13/2018    Procedure: COLONOSCOPY;  Surgeon: Shaniqua Chawla MD;  Location: [facility] (4TH FLR);  Service: Endoscopy;  Laterality: N/A;  schedule as 45 minute case ASAP     COSMETIC SURGERY      FIXATION OF TENDON Left 1/14/2019    Procedure: OPEN BICEPS SUBPECTORALIS TENODESIS;  Surgeon: Rakesh العلي MD;  Location: [facility] OR;  Service: Orthopedics;  Laterality: Left;    FRACTURE SURGERY      HERNIA REPAIR      KNEE SURGERY      LYMPH NODE BIOPSY Left 3/27/2019    Procedure: BIOPSY, LYMPH NODE Left Inguinal;  Surgeon: Patrick Lauren MD;  Location: [facility] OR 2ND FLR;  Service: General;  Laterality: Left;    RECONSTRUCTION OF FINGER Left 1/27/2021    Procedure: RECONSTRUCTION, FINGER - left small finger swan neck reconstruction;  Surgeon: Nikos Bullock MD;  Location: [facility];  Service: Orthopedics;  Laterality: Left;     Review of patient's allergies indicates:  No Known Allergies  Social History     Social History Narrative    Not on file     Family History   Problem Relation Age of Onset    Stomach cancer Mother     Lung cancer Maternal Grandmother     Melanoma Neg Hx     Celiac disease Neg Hx     Cirrhosis Neg Hx     Colon cancer Neg Hx     Colon polyps Neg Hx     Crohn's disease Neg Hx     Cystic fibrosis Neg Hx     Esophageal cancer Neg Hx     Hemochromatosis Neg Hx     Inflammatory bowel disease Neg Hx     Irritable bowel syndrome Neg Hx     Liver cancer Neg Hx     Liver disease Neg Hx     Rectal cancer Neg Hx     Ulcerative colitis Neg Hx     Wilner's disease Neg Hx     Lymphoma Neg Hx     Tuberculosis Neg Hx     Scleroderma Neg Hx     Rheum arthritis Neg Hx     Multiple sclerosis Neg Hx     Lupus Neg Hx     Psoriasis Neg Hx     Skin cancer Neg Hx          Current Outpatient Medications:     albuterol (PROVENTIL/VENTOLIN HFA) 90 mcg/actuation inhaler, INHALE 2 PUFFS INTO THE LUNGS EVERY 6 HOURS AS NEEDED FOR WHEEZING, Disp:  "25.5 g, Rfl: 0    efavirenz-emtrictabine-tenofovir 600-200-300 mg (ATRIPLA) 600-200-300 mg Tab, Take 1 tablet by mouth every evening., Disp: 30 tablet, Rfl: 11    HYDROcodone-acetaminophen (NORCO) 7.5-325 mg per tablet, Take 1 tablet by mouth every 6 (six) hours as needed. (Patient not taking: Reported on 6/13/2022), Disp: 28 tablet, Rfl: 0    insulin syringe-needle U-100 1 mL 30 gauge X 7/16" Syrg, Use as directed, Disp: 100 each, Rfl: 5    lisinopriL 10 MG tablet, Take 1 tablet (10 mg total) by mouth once daily., Disp: 30 tablet, Rfl: 11    SEROSTIM 6 mg SolR, INJECT UNDER THE SKIN EVERY DAY, Disp: 28 each, Rfl: 11    triamcinolone acetonide 0.1% (KENALOG) 0.1 % cream, Apply topically 2 (two) times daily., Disp: , Rfl:       Review of Systems:  As per HPI otherwise noncontributory    OBJECTIVE:      Vital Signs (Most Recent):  Vitals:    07/11/22 0759   Weight: 103 kg (227 lb)   Height: 6' 4" (1.93 m)     Body mass index is 27.63 kg/m².      Physical Exam:  Constitutional: The patient appears well-developed and well-nourished. No distress.   Skin: No lesions appreciated  Head: Normocephalic and atraumatic.   Nose: Nose normal.   Ears: No deformities seen  Eyes: Conjunctivae and EOM are normal.   Neck: No tracheal deviation present.   Cardiovascular: Normal rate and intact distal pulses.    Pulmonary/Chest: Effort normal. No respiratory distress.   Abdominal: There is no guarding.   Neurological: The patient is alert.   Psychiatric: The patient has a normal mood and affect.     Right Hand/Wrist Examination:    Observation/Inspection:  Swelling  none    Deformity  none  Discoloration  none     Scars   none    Atrophy  none    HAND/WRIST EXAMINATION:  Finkelstein's Test   Neg  WHAT Test    Neg  Snuff box tenderness   minimal  Bess's Test    Neg  Hook of Hamate Tenderness  Neg  CMC grind    negative  Circumduction test   Neg    Neurovascular Exam:  Digits WWP, brisk CR < 3s throughout  NVI motor/LTS to M/R/U " nerves, radial pulse 2+  Tinel's Test - Carpal Tunnel  Neg  Tinel's Test - Cubital Tunnel  Neg  Phalen's Test    Neg  Median Nerve Compression Test Neg    ROM hand full, painless    ROM wrist full, pain with hyperflexion     ROM elbow full, painless    Abdomen not guarded  Respirations nonlabored  Perfusion intact    Diagnostic Results:     Imaging - I independently viewed the patient's imaging as well as the radiology report.  Xrays of the patient's Scaphoid fracture non-union in the middle third.     MRI right wrist-Impression:     1. 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  2. Joint effusions with synovitis involving the distal radioulnar, radiocarpal, intercarpal, and MCP joints.  Multifocal subchondral bone marrow edema and cystic change without definite erosions.  Multifocal tenosynovitis.  Overall appearance concerning for superimposed inflammatory arthropathy.  3. Tear of central TFCC.    EMG - none     ASSESSMENT/PLAN:      46 y.o. yo male with Right scaphoid fracture nonunion and also ulnar sided wrist and small finger pain     Plan: The patient and I had a thorough discussion today.  We discussed the working diagnosis as well as several other potential alternative diagnoses.  Treatment options were discussed, both conservative and surgical.  Conservative treatment options would include things such as activity modifications, workplace modifications, a period of rest, oral vs topical OTC and prescription anti-inflammatory medications, occupational therapy, splinting/bracing, immobilization, corticosteroid injections, and others.  Surgical options were discussed as well.     At this time the patient notes that his right wrist pain has improved significantly since last visit.  He certainly has some findings on his MRI as per above but reports that symptoms have improved.  We discussed observation of his scaphoid nonunion versus  attempted nonunion preservation surgery and also salvage options such as a PRC or four-corner fusion.  For now the patient would like to consider these options further.  Follow-up in 6 months with new x-rays or sooner for any problems or should his pain worsen or recur.    He would like to observe for now which I feel is reasonable given the chronicity of this.      Should the patient's symptoms worsen, persist, or fail to improve they should return for reevaluation and I would be happy to see them back anytime.     Please be aware that this note has been generated with the assistance of [employer] voice-to-text.  Please excuse any spelling or grammatical errors.    Thank you for choosing Dr. Nikos Bullock for your orthopedic hand and upper extremity care. It is our goal to provide you with exceptional care that will help keep you healthy, active, and get you back in the game.     If you felt that you received exemplary care today, please consider leaving feedback for Dr. Bullock on [employer] at [email or web address].    Please do not hesitate to reach out to us via email, phone, or MyChart with any questions, concerns, or feedback.

## 2022-07-12 ENCOUNTER — TELEPHONE (OUTPATIENT)
Dept: OPHTHALMOLOGY | Facility: CLINIC | Age: 47
End: 2022-07-12
Payer: MEDICARE

## 2022-07-12 NOTE — TELEPHONE ENCOUNTER
----- Message from Roque Wilson sent at 7/12/2022 12:07 PM CDT -----  Regarding: Appt  Contact: Moisés Valle has been experiencing dizziness and floaters for more than 48 hours.    [phone]

## 2022-07-12 NOTE — TELEPHONE ENCOUNTER
Specialty Pharmacy - Refill Coordination    Specialty Medication Orders Linked to Encounter    Flowsheet Row Most Recent Value   Medication #1 efavirenz-emtrictabine-tenofovir 600-200-300 mg (ATRIPLA) 600-200-300 mg Tab (Order#[id number], Rx#[id number])          Refill Questions - Documented Responses    Flowsheet Row Most Recent Value   Patient Availability and HIPAA Verification    Does patient want to proceed with activity? Yes   HIPAA/medical authority confirmed? Yes   Relationship to patient of person spoken to? Self   Refill Screening Questions    Changes to allergies? No   Changes to medications? No   New conditions since last clinic visit? No   Unplanned office visit, urgent care, ED, or hospital admission in the last 4 weeks? No   How does patient/caregiver feel medication is working? Excellent   Financial problems or insurance changes? No   How many doses of your specialty medications were missed in the last 4 weeks? 0   Would patient like to speak to a pharmacist? No   When does the patient need to receive the medication? 07/20/22   Refill Delivery Questions    How will the patient receive the medication? Delivery Ashli   When does the patient need to receive the medication? 07/20/22   Shipping Address Home   Address in [facility] confirmed and updated if neccessary? Yes   Expected Copay ($) 0   Is the patient able to afford the medication copay? Yes   Payment Method zero copay   Days supply of Refill 30   Supplies needed? No supplies needed   Refill activity completed? Yes   Refill activity plan Refill scheduled   Shipment/Pickup Date: 07/14/22          Current Outpatient Medications   Medication Sig    albuterol (PROVENTIL/VENTOLIN HFA) 90 mcg/actuation inhaler INHALE 2 PUFFS INTO THE LUNGS EVERY 6 HOURS AS NEEDED FOR WHEEZING    efavirenz-emtrictabine-tenofovir 600-200-300 mg (ATRIPLA) 600-200-300 mg Tab Take 1 tablet by mouth every evening.    HYDROcodone-acetaminophen (NORCO) 7.5-325 mg per  "tablet Take 1 tablet by mouth every 6 (six) hours as needed. (Patient not taking: Reported on 6/13/2022)    insulin syringe-needle U-100 1 mL 30 gauge X 7/16" Syrg Use as directed    lisinopriL 10 MG tablet Take 1 tablet (10 mg total) by mouth once daily.    SEROSTIM 6 mg SolR INJECT UNDER THE SKIN EVERY DAY    triamcinolone acetonide 0.1% (KENALOG) 0.1 % cream Apply topically 2 (two) times daily.   Last reviewed on 7/11/2022  8:00 AM by Isabelle Mendoza MA    Review of patient's allergies indicates:  No Known Allergies Last reviewed on  7/11/2022 7:59 AM by Isabelle Mendoza      Tasks added this encounter   No tasks added.   Tasks due within next 3 months   7/7/2022 - Refill Call (Auto Added)     [facility]  [address]  [facility] [address]  Phone: [phone]  Fax: [phone]      "

## 2022-07-14 ENCOUNTER — PATIENT MESSAGE (OUTPATIENT)
Dept: INFECTIOUS DISEASES | Facility: CLINIC | Age: 47
End: 2022-07-14
Payer: MEDICARE

## 2022-07-14 RX ORDER — FLUCONAZOLE 200 MG/1
200 TABLET ORAL DAILY
Qty: 7 TABLET | Refills: 0 | Status: SHIPPED | OUTPATIENT
Start: 2022-07-14 | End: 2022-07-21

## 2022-07-26 ENCOUNTER — PATIENT MESSAGE (OUTPATIENT)
Dept: INFECTIOUS DISEASES | Facility: CLINIC | Age: 47
End: 2022-07-26
Payer: MEDICARE

## 2022-08-02 ENCOUNTER — PATIENT MESSAGE (OUTPATIENT)
Dept: INFECTIOUS DISEASES | Facility: CLINIC | Age: 47
End: 2022-08-02
Payer: MEDICARE

## 2022-08-04 ENCOUNTER — OFFICE VISIT (OUTPATIENT)
Dept: OPTOMETRY | Facility: CLINIC | Age: 47
End: 2022-08-04
Payer: MEDICARE

## 2022-08-04 ENCOUNTER — PATIENT MESSAGE (OUTPATIENT)
Dept: INFECTIOUS DISEASES | Facility: CLINIC | Age: 47
End: 2022-08-04
Payer: MEDICARE

## 2022-08-04 DIAGNOSIS — H53.8 BLURRED VISION, RIGHT EYE: ICD-10-CM

## 2022-08-04 DIAGNOSIS — H30.93: ICD-10-CM

## 2022-08-04 DIAGNOSIS — H53.10 SUBJECTIVE VISUAL DISTURBANCE: Primary | ICD-10-CM

## 2022-08-04 DIAGNOSIS — H52.223 REGULAR ASTIGMATISM OF BOTH EYES: ICD-10-CM

## 2022-08-04 DIAGNOSIS — H53.15 VISUAL DISTORTIONS OF SHAPE AND SIZE: ICD-10-CM

## 2022-08-04 DIAGNOSIS — H43.393 VITREOUS FLOATERS, BILATERAL: ICD-10-CM

## 2022-08-04 PROCEDURE — 92015 PR REFRACTION: ICD-10-PCS | Mod: ,,, | Performed by: OPTOMETRIST

## 2022-08-04 PROCEDURE — 92004 PR EYE EXAM, NEW PATIENT,COMPREHESV: ICD-10-PCS | Mod: S$PBB,,, | Performed by: OPTOMETRIST

## 2022-08-04 PROCEDURE — 92015 DETERMINE REFRACTIVE STATE: CPT | Mod: ,,, | Performed by: OPTOMETRIST

## 2022-08-04 PROCEDURE — 99999 PR PBB SHADOW E&M-EST. PATIENT-LVL I: CPT | Mod: PBBFAC,,, | Performed by: OPTOMETRIST

## 2022-08-04 PROCEDURE — 99211 OFF/OP EST MAY X REQ PHY/QHP: CPT | Mod: PBBFAC | Performed by: OPTOMETRIST

## 2022-08-04 PROCEDURE — 99999 PR PBB SHADOW E&M-EST. PATIENT-LVL I: ICD-10-PCS | Mod: PBBFAC,,, | Performed by: OPTOMETRIST

## 2022-08-04 PROCEDURE — 92004 COMPRE OPH EXAM NEW PT 1/>: CPT | Mod: S$PBB,,, | Performed by: OPTOMETRIST

## 2022-08-04 NOTE — PROGRESS NOTES
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 (History)        ROS     Negative for: Constitutional, Gastrointestinal, Neurological, Skin,   Genitourinary, Musculoskeletal, HENT, Endocrine, Cardiovascular, Eyes,   Respiratory, Psychiatric, Allergic/Imm, Heme/Lymph    Last edited by Aicha Arshad, OD on 2022  9:39 AM. (History)        Assessment /Plan     For exam results, see Encounter Report.    Subjective visual disturbance  -     Color Fundus Photography - OU - Both Eyes; Future  -     OCT, Retina - OU - Both Eyes; Future    Vitreous floaters, bilateral    Blurred vision, right eye    Visual distortions of shape and size   -     OCT, Retina - OU - Both Eyes; Future    Regular astigmatism of both eyes    Mewds (multiple evanescent white dot syndrome), bilateral      MONITOR. ED PT ON ALL EXAM FINDINGS  NO SPECS RX    OD; H/O BLUNT TRAUMA OD; REPORTS INCREASED INTENSITY OF FLASHES AND FLOATERS. ORDER IMAGING TO R/O RETINAL/VITREOUS FINDINGS   fundus auto?uorescence showing hyper?uorescent dots in macular area OD<OS; REFER TO RETINA TO R/O MEWDS /WHITE DOT SYNDROMES   SYMPTOMATIC FOR BLURRED VISION, CLOUD SCOTOMA, AND PHOTOPSIA IN OD>OS

## 2022-08-05 ENCOUNTER — PATIENT MESSAGE (OUTPATIENT)
Dept: OPTOMETRY | Facility: CLINIC | Age: 47
End: 2022-08-05
Payer: MEDICARE

## 2022-08-08 ENCOUNTER — OFFICE VISIT (OUTPATIENT)
Dept: OTOLARYNGOLOGY | Facility: CLINIC | Age: 47
End: 2022-08-08
Payer: MEDICARE

## 2022-08-08 VITALS
SYSTOLIC BLOOD PRESSURE: 148 MMHG | DIASTOLIC BLOOD PRESSURE: 85 MMHG | HEART RATE: 87 BPM | WEIGHT: 213.19 LBS | BODY MASS INDEX: 25.95 KG/M2

## 2022-08-08 DIAGNOSIS — K13.70 ORAL LESION: Primary | ICD-10-CM

## 2022-08-08 DIAGNOSIS — J34.89 INTERNAL NASAL LESION: ICD-10-CM

## 2022-08-08 PROCEDURE — 99205 OFFICE O/P NEW HI 60 MIN: CPT | Mod: S$PBB,25,, | Performed by: NURSE PRACTITIONER

## 2022-08-08 PROCEDURE — 31575 DIAGNOSTIC LARYNGOSCOPY: CPT | Mod: PBBFAC | Performed by: NURSE PRACTITIONER

## 2022-08-08 PROCEDURE — 88305 TISSUE EXAM BY PATHOLOGIST: CPT | Mod: 26,,, | Performed by: PATHOLOGY

## 2022-08-08 PROCEDURE — 88341 IMHCHEM/IMCYTCHM EA ADD ANTB: CPT | Mod: 26,,, | Performed by: PATHOLOGY

## 2022-08-08 PROCEDURE — 42100 BIOPSY ROOF OF MOUTH: CPT | Mod: PBBFAC | Performed by: NURSE PRACTITIONER

## 2022-08-08 PROCEDURE — 99205 PR OFFICE/OUTPT VISIT, NEW, LEVL V, 60-74 MIN: ICD-10-PCS | Mod: S$PBB,25,, | Performed by: NURSE PRACTITIONER

## 2022-08-08 PROCEDURE — 42100 PR BIOPSY PALATE/UVULA: ICD-10-PCS | Mod: S$PBB,,, | Performed by: NURSE PRACTITIONER

## 2022-08-08 PROCEDURE — 88342 IMHCHEM/IMCYTCHM 1ST ANTB: CPT | Mod: 26,,, | Performed by: PATHOLOGY

## 2022-08-08 PROCEDURE — 88342 CHG IMMUNOCYTOCHEMISTRY: ICD-10-PCS | Mod: 26,,, | Performed by: PATHOLOGY

## 2022-08-08 PROCEDURE — 88305 TISSUE EXAM BY PATHOLOGIST: CPT | Performed by: PATHOLOGY

## 2022-08-08 PROCEDURE — 99999 PR PBB SHADOW E&M-EST. PATIENT-LVL III: CPT | Mod: PBBFAC,,, | Performed by: NURSE PRACTITIONER

## 2022-08-08 PROCEDURE — 99999 PR PBB SHADOW E&M-EST. PATIENT-LVL III: ICD-10-PCS | Mod: PBBFAC,,, | Performed by: NURSE PRACTITIONER

## 2022-08-08 PROCEDURE — 88312 SPECIAL STAINS GROUP 1: CPT | Performed by: PATHOLOGY

## 2022-08-08 PROCEDURE — 88305 TISSUE EXAM BY PATHOLOGIST: ICD-10-PCS | Mod: 26,,, | Performed by: PATHOLOGY

## 2022-08-08 PROCEDURE — 88312 SPECIAL STAINS GROUP 1: CPT | Mod: 26,,, | Performed by: PATHOLOGY

## 2022-08-08 PROCEDURE — 99213 OFFICE O/P EST LOW 20 MIN: CPT | Mod: PBBFAC,25 | Performed by: NURSE PRACTITIONER

## 2022-08-08 PROCEDURE — 88342 IMHCHEM/IMCYTCHM 1ST ANTB: CPT | Mod: 59 | Performed by: PATHOLOGY

## 2022-08-08 PROCEDURE — 42100 BIOPSY ROOF OF MOUTH: CPT | Mod: S$PBB,,, | Performed by: NURSE PRACTITIONER

## 2022-08-08 PROCEDURE — 88312 PR  SPECIAL STAINS,GROUP I: ICD-10-PCS | Mod: 26,,, | Performed by: PATHOLOGY

## 2022-08-08 PROCEDURE — 31575 DIAGNOSTIC LARYNGOSCOPY: CPT | Mod: S$PBB,59,, | Performed by: NURSE PRACTITIONER

## 2022-08-08 PROCEDURE — 31575 PR LARYNGOSCOPY, FLEXIBLE; DIAGNOSTIC: ICD-10-PCS | Mod: S$PBB,59,, | Performed by: NURSE PRACTITIONER

## 2022-08-08 PROCEDURE — 88341 PR IHC OR ICC EACH ADD'L SINGLE ANTIBODY  STAINPR: ICD-10-PCS | Mod: 26,,, | Performed by: PATHOLOGY

## 2022-08-08 NOTE — PROGRESS NOTES
Subjective:       Patient ID: Moisés Koehler is a 46 y.o. male.    Chief Complaint: consult/food getting stuck in throat, feels like he's swall and c/o tongue pain    HPI     Moisés Koehler is a 46 year old male who was referred to the head and neck clinic for tongue ulcers and bleeding ulcers to his nose. He first developed symptoms over a month ago. He recently completed a 7 day course of Fluconazole, which helped somewhat, but symptoms did not resolve. There is no oral bleeding. He is using oragel mouthwash and practicing good oral care. He feels this on the sides of his tongue, back of tongue, and roof of mouth. His lips itch as well. He has heart burn and GERD. He feels that his symptoms are well controlled with Nexium. He does not use tobacco products.    In April, he had a painful lesion to his penis. This mostly healed with steroids and topical medication. At the same time, he developed a full body rash, which resolved. He also is being treated for retina problems in his right eye.    Past Medical History:   Diagnosis Date    AC joint dislocation     Arthritis     GERD (gastroesophageal reflux disease) 2008    HIV (human immunodeficiency virus infection)     Hypertension        Past Surgical History:   Procedure Laterality Date    ARTHROSCOPIC DEBRIDEMENT OF SHOULDER Left 1/14/2019    Procedure: DEBRIDEMENT SLAP, SHOULDER, ARTHROSCOPIC;  Surgeon: Rakesh العلي MD;  Location: [facility];  Service: Orthopedics;  Laterality: Left;  regional w/o catheter     ARTHROSCOPY OF SHOULDER WITH DECOMPRESSION OF SUBACROMIAL SPACE Left 1/14/2019    Procedure: ARTHROSCOPY, SHOULDER, WITH SUBACROMIAL DECOMPRESSION;  Surgeon: Rakesh العلي MD;  Location: [facility];  Service: Orthopedics;  Laterality: Left;    COLONOSCOPY N/A 12/13/2018    Procedure: COLONOSCOPY;  Surgeon: Shaniqua Chawla MD;  Location: [address]);  Service: Endoscopy;  Laterality: N/A;  schedule as 45 minute case ASAP   "   COSMETIC SURGERY      FIXATION OF TENDON Left 1/14/2019    Procedure: OPEN BICEPS SUBPECTORALIS TENODESIS;  Surgeon: Rakesh العلي MD;  Location: [facility] OR;  Service: Orthopedics;  Laterality: Left;    FRACTURE SURGERY      HERNIA REPAIR      KNEE SURGERY      LYMPH NODE BIOPSY Left 3/27/2019    Procedure: BIOPSY, LYMPH NODE Left Inguinal;  Surgeon: Patrick Lauren MD;  Location: [address];  Service: General;  Laterality: Left;    RECONSTRUCTION OF FINGER Left 1/27/2021    Procedure: RECONSTRUCTION, FINGER - left small finger swan neck reconstruction;  Surgeon: Nikos Bullock MD;  Location: [facility] OR;  Service: Orthopedics;  Laterality: Left;         Current Outpatient Medications:     albuterol (PROVENTIL/VENTOLIN HFA) 90 mcg/actuation inhaler, INHALE 2 PUFFS INTO THE LUNGS EVERY 6 HOURS AS NEEDED FOR WHEEZING, Disp: 25.5 g, Rfl: 0    efavirenz-emtrictabine-tenofovir 600-200-300 mg (ATRIPLA) 600-200-300 mg Tab, Take 1 tablet by mouth every evening., Disp: 30 tablet, Rfl: 11    HYDROcodone-acetaminophen (NORCO) 7.5-325 mg per tablet, Take 1 tablet by mouth every 6 (six) hours as needed., Disp: 28 tablet, Rfl: 0    insulin syringe-needle U-100 1 mL 30 gauge X 7/16" Syrg, Use as directed, Disp: 100 each, Rfl: 5    SEROSTIM 6 mg SolR, INJECT UNDER THE SKIN EVERY DAY, Disp: 28 each, Rfl: 11    triamcinolone acetonide 0.1% (KENALOG) 0.1 % cream, Apply topically 2 (two) times daily., Disp: , Rfl:     (Magic mouthwash) 1:1:1 diphenhydramine(Benadryl) 12.5mg/5ml liq, aluminum & magnesium hydroxide-simethicone (Maalox), LIDOcaine viscous 2%, Swish and spit 5 mLs every 4 (four) hours as needed. for mouth sores, Disp: 450 mL, Rfl: 2    lisinopriL 10 MG tablet, Take 1 tablet (10 mg total) by mouth once daily., Disp: 30 tablet, Rfl: 11    Review of patient's allergies indicates:  No Known Allergies    Social History     Socioeconomic History    Marital status: Single   Tobacco Use    Smoking " status: Never Smoker    Smokeless tobacco: Never Used   Substance and Sexual Activity    Alcohol use: No    Drug use: Yes     Frequency: 7.0 times per week     Types: Marijuana    Sexual activity: Not Currently     Partners: Female     Birth control/protection: Condom       Family History   Problem Relation Age of Onset    Stomach cancer Mother     Lung cancer Maternal Grandmother     Melanoma Neg Hx     Celiac disease Neg Hx     Cirrhosis Neg Hx     Colon cancer Neg Hx     Colon polyps Neg Hx     Crohn's disease Neg Hx     Cystic fibrosis Neg Hx     Esophageal cancer Neg Hx     Hemochromatosis Neg Hx     Inflammatory bowel disease Neg Hx     Irritable bowel syndrome Neg Hx     Liver cancer Neg Hx     Liver disease Neg Hx     Rectal cancer Neg Hx     Ulcerative colitis Neg Hx     Wilner's disease Neg Hx     Lymphoma Neg Hx     Tuberculosis Neg Hx     Scleroderma Neg Hx     Rheum arthritis Neg Hx     Multiple sclerosis Neg Hx     Lupus Neg Hx     Psoriasis Neg Hx     Skin cancer Neg Hx          Review of Systems   HENT: Positive for mouth sores, nosebleeds, sore throat and trouble swallowing.    Eyes: Positive for photophobia.   Respiratory: Negative.    Cardiovascular: Negative.    Endocrine: Positive for heat intolerance.   Genitourinary: Negative.    Musculoskeletal: Negative.    Integumentary:  Positive for rash.   Neurological: Negative.    Psychiatric/Behavioral: Negative.          Objective:      Physical Exam  Vitals reviewed.   Constitutional:       General: He is not in acute distress.     Appearance: He is well-developed. He is not ill-appearing or diaphoretic.   HENT:      Head: Normocephalic and atraumatic.      Jaw: No trismus.      Right Ear: Hearing, tympanic membrane, ear canal and external ear normal.      Left Ear: Hearing, tympanic membrane, ear canal and external ear normal.      Nose: Nose normal. No nasal deformity, mucosal edema or rhinorrhea.      Right Sinus: No  maxillary sinus tenderness or frontal sinus tenderness.      Left Sinus: No maxillary sinus tenderness or frontal sinus tenderness.      Mouth/Throat:      Lips: Pink. No lesions.      Mouth: Mucous membranes are moist. Mucous membranes are not pale, not dry and not cyanotic. Oral lesions present.      Dentition: Normal dentition. Does not have dentures. No dental caries.      Pharynx: Uvula midline. No posterior oropharyngeal erythema or uvula swelling.      Tonsils: No tonsillar abscesses.        Comments: Procedure: soft palate biopsy:  We discussed the risks, benefits, indications, and alternatives to oral biopsy. I explained that the risks of biopsy in the oral cavity include, but are not limited to, infection, bleeding, scarring, recurrence, failure to achieve a diagnosis, and the need for additional procedures.  Remotely, cheek or facial weakness could be possible if deep extension was identified.  Time was allowed for questions, and all questions were answered to the patient's apparent satisfaction.      A timeout was performed according to the universal protocol with full patient participation.  1% lidocaine with 1:100,000 epinephrine was then injected submucosally in the region of the lesion with a 27-gauge needle.  The patient was draped in the standard fashion.  The lesion was identified, and full thickness of mucosa including the lesion was sharply excised down to the submucosal layer. The lesion was severed from its base with the curved scissors and passed off the table for permanent pathologic analysis.  Hemostasis was achieved with direct pressure. The patient tolerated this procedure well.  There were no apparent complications.    Procedure: Flexible laryngoscopy  In order to fully examine the upper aerodigestive tract, including the larynx, in a patient with a hyperactive gag reflex, flexible endoscopy is required.  After explaining the procedure and obtaining verbal consent, a timeout was  performed with the patient's participation according to the universal protocol. Both nasal cavities were anesthetized with 4% Xylocaine spray mixed with Abelardo-Synephrine. The flexible laryngoscope (#[id number]) was inserted into the nasal cavity and advanced to visualize the nasal cavity, nasopharynx, the posterior oropharynx, hypopharynx, and the endolarynx with the above findings noted. The scope was removed and the procedure terminated. The patient tolerated this procedure well without apparent complication.      FINDINGS  Nasopharynx - the torus is clear. There are no lesions of the posterior wall.   Oropharynx - no lesions of the tongue base. There is no obvious fullness or asymmetry.  Hypopharynx - there are no lesions of the pyriform sinuses or postcricoid region   Larynx - there are no lesions of the supraglottic or glottic larynx. Vocal fold mobility is normal with complete closure.     Eyes:      General: No scleral icterus.        Right eye: No discharge.         Left eye: No discharge.      Conjunctiva/sclera: Conjunctivae normal.   Neck:      Thyroid: No thyroid mass or thyromegaly.      Vascular: No JVD.      Trachea: Trachea and phonation normal. No tracheal tenderness or tracheal deviation.      Comments: Salivary glands - there are no lesions or asymmetric findings in the submandibular or parotid glands    Cardiovascular:      Rate and Rhythm: Normal rate.   Pulmonary:      Effort: Pulmonary effort is normal. No respiratory distress.      Breath sounds: No stridor.   Musculoskeletal:      Cervical back: Normal range of motion and neck supple.   Lymphadenopathy:      Head:      Right side of head: No submental, submandibular, tonsillar or preauricular adenopathy.      Left side of head: No submental, submandibular, tonsillar or preauricular adenopathy.      Cervical: No cervical adenopathy.   Skin:     General: Skin is warm and dry.      Coloration: Skin is not pale.      Findings: No erythema or rash.    Neurological:      General: No focal deficit present.      Mental Status: He is alert and oriented to person, place, and time.      Cranial Nerves: No cranial nerve deficit.   Psychiatric:         Mood and Affect: Mood normal.         Behavior: Behavior normal. Behavior is cooperative.         Thought Content: Thought content normal.         Assessment:       Problem List Items Addressed This Visit        ENT    Internal nasal lesion    Oral lesion - Primary     46 year old male with painful oral and nasal lesions. Biopsy done in office. Magic mouthwash prescribed to help with discomfort. Questions answered. Will contact patient after biopsy.           Relevant Orders    Specimen to Pathology ENT          Plan:       Problem List Items Addressed This Visit        ENT    Internal nasal lesion    Oral lesion - Primary     46 year old male with painful oral and nasal lesions. Biopsy done in office. Magic mouthwash prescribed to help with discomfort. Questions answered. Will contact patient after biopsy.           Relevant Orders    Specimen to Pathology ENT        Dr. Fenton was present to examine patient and biopsy lesion.

## 2022-08-08 NOTE — ASSESSMENT & PLAN NOTE
46 year old male with painful oral and nasal lesions. Biopsy done in office. Magic mouthwash prescribed to help with discomfort. Questions answered. Will contact patient after biopsy.

## 2022-08-09 ENCOUNTER — PATIENT MESSAGE (OUTPATIENT)
Dept: INFECTIOUS DISEASES | Facility: CLINIC | Age: 47
End: 2022-08-09
Payer: MEDICARE

## 2022-08-10 DIAGNOSIS — Z21 HIV POSITIVE: Primary | ICD-10-CM

## 2022-08-11 ENCOUNTER — TELEPHONE (OUTPATIENT)
Dept: INFECTIOUS DISEASES | Facility: CLINIC | Age: 47
End: 2022-08-11
Payer: MEDICARE

## 2022-08-11 DIAGNOSIS — Z21 HIV POSITIVE: ICD-10-CM

## 2022-08-11 RX ORDER — ACETAMINOPHEN 325 MG/1
650 TABLET ORAL
Status: SHIPPED | OUTPATIENT
Start: 2022-08-11

## 2022-08-11 RX ORDER — EPINEPHRINE 0.3 MG/.3ML
0.3 INJECTION SUBCUTANEOUS
Status: SHIPPED | OUTPATIENT
Start: 2022-08-11

## 2022-08-11 RX ORDER — DIPHENHYDRAMINE HCL 25 MG
25 CAPSULE ORAL
Status: SHIPPED | OUTPATIENT
Start: 2022-08-11

## 2022-08-11 RX ORDER — ONDANSETRON 4 MG/1
4 TABLET, ORALLY DISINTEGRATING ORAL
Status: SHIPPED | OUTPATIENT
Start: 2022-08-11

## 2022-08-11 RX ORDER — PREDNISONE 5 MG/1
5 TABLET ORAL
Status: SHIPPED | OUTPATIENT
Start: 2022-08-11

## 2022-08-12 ENCOUNTER — PATIENT MESSAGE (OUTPATIENT)
Dept: OPTOMETRY | Facility: CLINIC | Age: 47
End: 2022-08-12
Payer: MEDICARE

## 2022-08-12 ENCOUNTER — SPECIALTY PHARMACY (OUTPATIENT)
Dept: PHARMACY | Facility: CLINIC | Age: 47
End: 2022-08-12
Payer: MEDICARE

## 2022-08-16 DIAGNOSIS — A52.71 OCULAR SYPHILIS: Primary | ICD-10-CM

## 2022-08-16 DIAGNOSIS — Z21 HIV POSITIVE: ICD-10-CM

## 2022-08-17 ENCOUNTER — TELEPHONE (OUTPATIENT)
Dept: INFECTIOUS DISEASES | Facility: CLINIC | Age: 47
End: 2022-08-17
Payer: MEDICARE

## 2022-08-17 ENCOUNTER — LAB VISIT (OUTPATIENT)
Dept: LAB | Facility: HOSPITAL | Age: 47
End: 2022-08-17
Payer: MEDICARE

## 2022-08-17 ENCOUNTER — CLINICAL SUPPORT (OUTPATIENT)
Dept: INFECTIOUS DISEASES | Facility: CLINIC | Age: 47
End: 2022-08-17
Payer: MEDICARE

## 2022-08-17 DIAGNOSIS — Z21 HIV POSITIVE: Primary | ICD-10-CM

## 2022-08-17 DIAGNOSIS — Z21 HIV POSITIVE: ICD-10-CM

## 2022-08-17 PROCEDURE — 86592 SYPHILIS TEST NON-TREP QUAL: CPT | Performed by: INTERNAL MEDICINE

## 2022-08-17 PROCEDURE — 86593 SYPHILIS TEST NON-TREP QUANT: CPT | Performed by: INTERNAL MEDICINE

## 2022-08-17 PROCEDURE — 86780 TREPONEMA PALLIDUM: CPT | Performed by: INTERNAL MEDICINE

## 2022-08-17 PROCEDURE — 36415 COLL VENOUS BLD VENIPUNCTURE: CPT | Performed by: INTERNAL MEDICINE

## 2022-08-17 PROCEDURE — 96372 THER/PROPH/DIAG INJ SC/IM: CPT | Mod: PBBFAC

## 2022-08-17 PROCEDURE — 87536 HIV-1 QUANT&REVRSE TRNSCRPJ: CPT | Performed by: INTERNAL MEDICINE

## 2022-08-17 RX ADMIN — PENICILLIN G BENZATHINE 2.4 MILLION UNITS: 1200000 INJECTION, SUSPENSION INTRAMUSCULAR at 09:08

## 2022-08-17 NOTE — TELEPHONE ENCOUNTER
Specialty Pharmacy - Refill Coordination    Specialty Medication Orders Linked to Encounter    Flowsheet Row Most Recent Value   Medication #1 efavirenz-emtrictabine-tenofovir 600-200-300 mg (ATRIPLA) 600-200-300 mg Tab (Order#[id number], Rx#[id number])          Refill Questions - Documented Responses    Flowsheet Row Most Recent Value   Patient Availability and HIPAA Verification    Does patient want to proceed with activity? Yes   HIPAA/medical authority confirmed? Yes   Relationship to patient of person spoken to? Self   Refill Screening Questions    Changes to allergies? No   Changes to medications? No   New conditions since last clinic visit? No   Unplanned office visit, urgent care, ED, or hospital admission in the last 4 weeks? No   How does patient/caregiver feel medication is working? Very good   Financial problems or insurance changes? No   How many doses of your specialty medications were missed in the last 4 weeks? 0   Would patient like to speak to a pharmacist? No   When does the patient need to receive the medication? 08/23/22   Refill Delivery Questions    How will the patient receive the medication? Delivery Ashli   When does the patient need to receive the medication? 08/23/22   Shipping Address Home   Address in [facility] confirmed and updated if neccessary? Yes   Expected Copay ($) 0   Is the patient able to afford the medication copay? Yes   Payment Method zero copay   Days supply of Refill 28   Supplies needed? No supplies needed   Refill activity completed? Yes   Refill activity plan Refill scheduled   Shipment/Pickup Date: 08/18/22          Current Outpatient Medications   Medication Sig    (Magic mouthwash) 1:1:1 diphenhydramine(Benadryl) 12.5mg/5ml liq, aluminum & magnesium hydroxide-simethicone (Maalox), LIDOcaine viscous 2% Swish and spit 5 mLs every 4 (four) hours as needed. for mouth sores    albuterol (PROVENTIL/VENTOLIN HFA) 90 mcg/actuation inhaler INHALE 2 PUFFS INTO THE  "LUNGS EVERY 6 HOURS AS NEEDED FOR WHEEZING    efavirenz-emtrictabine-tenofovir 600-200-300 mg (ATRIPLA) 600-200-300 mg Tab Take 1 tablet by mouth every evening.    HYDROcodone-acetaminophen (NORCO) 7.5-325 mg per tablet Take 1 tablet by mouth every 6 (six) hours as needed.    insulin syringe-needle U-100 1 mL 30 gauge X 7/16" Syrg Use as directed    lisinopriL 10 MG tablet Take 1 tablet (10 mg total) by mouth once daily.    SEROSTIM 6 mg SolR INJECT UNDER THE SKIN EVERY DAY    triamcinolone acetonide 0.1% (KENALOG) 0.1 % cream Apply topically 2 (two) times daily.   Last reviewed on 8/8/2022  1:07 PM by Daniel Erazo MA    Review of patient's allergies indicates:  No Known Allergies Last reviewed on  8/16/2022 12:21 PM by Smooth Millan      Tasks added this encounter   9/13/2022 - Refill Call (Auto Added)   Tasks due within next 3 months   No tasks due.     Shen Woods, PharmD  [facility]  [address] [facility] [address]  Phone: [phone]  Fax: [phone]      "

## 2022-08-17 NOTE — PROGRESS NOTES
Patient received Bicillin 2.4 million units (1.2 million units in each buttocks). Pt tolerated well. Pt asked to wait in the clinic 15 minutes after injection in the event of an allergic reaction. Pt verbalized understanding. Pt left in NAD.

## 2022-08-18 ENCOUNTER — OFFICE VISIT (OUTPATIENT)
Dept: OPHTHALMOLOGY | Facility: CLINIC | Age: 47
End: 2022-08-18
Payer: MEDICARE

## 2022-08-18 ENCOUNTER — LAB VISIT (OUTPATIENT)
Dept: LAB | Facility: HOSPITAL | Age: 47
End: 2022-08-18
Attending: OPHTHALMOLOGY
Payer: MEDICARE

## 2022-08-18 ENCOUNTER — PATIENT MESSAGE (OUTPATIENT)
Dept: OPHTHALMOLOGY | Facility: CLINIC | Age: 47
End: 2022-08-18

## 2022-08-18 DIAGNOSIS — A52.71 OCULAR SYPHILIS: Primary | ICD-10-CM

## 2022-08-18 DIAGNOSIS — Z21 HIV POSITIVE: ICD-10-CM

## 2022-08-18 DIAGNOSIS — H43.813 VITREOUS DEGENERATION OF BOTH EYES: ICD-10-CM

## 2022-08-18 DIAGNOSIS — H47.10 OPTIC DISC EDEMA: ICD-10-CM

## 2022-08-18 DIAGNOSIS — H43.89 VITRITIS: ICD-10-CM

## 2022-08-18 LAB
HIV1 RNA # PLAS NAA DL=20: NORMAL COPIES/ML
RPR SER QL: REACTIVE
RPR SER-TITR: ABNORMAL {TITER}

## 2022-08-18 PROCEDURE — 92134 CPTRZ OPH DX IMG PST SGM RTA: CPT | Mod: PBBFAC | Performed by: OPHTHALMOLOGY

## 2022-08-18 PROCEDURE — 92134 OCT, RETINA - OU - BOTH EYES: ICD-10-PCS | Mod: 26,S$PBB,, | Performed by: OPHTHALMOLOGY

## 2022-08-18 PROCEDURE — 86480 TB TEST CELL IMMUN MEASURE: CPT | Performed by: OPHTHALMOLOGY

## 2022-08-18 PROCEDURE — 99204 OFFICE O/P NEW MOD 45 MIN: CPT | Mod: S$PBB,,, | Performed by: OPHTHALMOLOGY

## 2022-08-18 PROCEDURE — 99204 PR OFFICE/OUTPT VISIT, NEW, LEVL IV, 45-59 MIN: ICD-10-PCS | Mod: S$PBB,,, | Performed by: OPHTHALMOLOGY

## 2022-08-18 PROCEDURE — 99999 PR PBB SHADOW E&M-EST. PATIENT-LVL III: CPT | Mod: PBBFAC,,, | Performed by: OPHTHALMOLOGY

## 2022-08-18 PROCEDURE — 99999 PR PBB SHADOW E&M-EST. PATIENT-LVL III: ICD-10-PCS | Mod: PBBFAC,,, | Performed by: OPHTHALMOLOGY

## 2022-08-18 PROCEDURE — 99213 OFFICE O/P EST LOW 20 MIN: CPT | Mod: PBBFAC | Performed by: OPHTHALMOLOGY

## 2022-08-18 NOTE — PROGRESS NOTES
"HPI     Concerns About Ocular Health      Additional comments: White Dot Syndrome Eval- Dr. Arshad              Comments     Pt states his va is now getting poor in OS. He is seeing "white and black   clouds" in OU(OS>OD). Some flashes(OU).    Subjective visual disturbance  Vitreous floaters, bilateral  Blurred vision, right eye  Visual distortions of shape and size   Regular astigmatism of both eyes   Mewds (multiple evanescent white dot syndrome), bilateral          Last edited by Burt Toro on 8/18/2022  9:54 AM. 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 HIV positive  Z21 V08   3. Vitritis  H43.89 379.29   4. 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 Vitreous degeneration of both eyes  H43.813 379.21       1. Ocular syphilis  2. HIV positive  3. Vitritis  4. Optic disc edema  Referral from Dr. Arshad for retina eval  Pt with several months of floaters, worsened since July  HIV +, undetectable viral load, CD4 5/2022- 870    Pt girlfriend tested positive for syphilis  Pt has had mouth sores also    Exam today VA 20/20 OD, 20/70 OS, disc edema OU with mild vitritis, mild areas of vasculitis with heme OS but no sam sheathing just heme    Sees ID - Dr. Millan, was started on weekly IM Penicillin yesterday - (pt experiencing Jarisch Herxheimer reaction)-- Needs daily for 14 days    Plan: will contact ID team to ensure patient gets 14 days IM Penicillin, needs probenicid too  Will order quant gold just to make sure also no TB though risk is very low given appearance/exam    RTC 1 week DFE/OCTm OU    I saw and examined the patient and reviewed in detail the findings documented. The final examination findings, image interpretations, and plan as documented in the record represent my personal judgment and conclusions.    Alexey Cabezas MD  Vitreoretinal Surgery   [facility]  "

## 2022-08-19 ENCOUNTER — PROCEDURE VISIT (OUTPATIENT)
Dept: NURSING | Facility: OTHER | Age: 47
End: 2022-08-19
Attending: INTERNAL MEDICINE
Payer: MEDICARE

## 2022-08-19 DIAGNOSIS — Z21 HIV POSITIVE: ICD-10-CM

## 2022-08-19 DIAGNOSIS — A52.71 OCULAR SYPHILIS: ICD-10-CM

## 2022-08-19 LAB
GAMMA INTERFERON BACKGROUND BLD IA-ACNC: 0.03 IU/ML
M TB IFN-G CD4+ BCKGRND COR BLD-ACNC: 0 IU/ML
MITOGEN IGNF BCKGRD COR BLD-ACNC: 9.97 IU/ML
TB GOLD PLUS: NEGATIVE
TB2 - NIL: 0.02 IU/ML

## 2022-08-19 NOTE — PROGRESS NOTES
[facility] educator met with patient in outpatient radiology at [facility] where he had his midline placed to discuss plan for home IVABX. Patient will infuse medication via Elastomeric Pump. Patient educated on S.A procedure. S.A.S.H mat provided.  Patient education checklist reviewed and acknowledged by above person(s) and are agreeable to discharge with home infusion plan of care. IV administration process using aspetic technique was reviewed with successful return demonstration. Patient feels comfortable with infusion. At approximately 10:30am patient connected himself to his continuous PNC without difficulty. Patient will dc home with PNC continuous for estimated 2 weeks of therapy. Patient had a SL midline placed to the left upper arm this morning 8/19/22 that is 20 cm in length. Extension tubing applied to midline for self infusion. Tube gauze applied to left upper arm. Patient will come to [facility] for weekly dressing changes and lab draws. First appointment scheduled for Friday 8/26/22 at 8:30 am. Time allotted for questions.      Medication delivery will be made to bedside    Tere Gomez RN, BSN  Clinical Liaison   [facility]  Cell [phone]  Available M-F 8:30-5pm  Office [phone]  Available 24/7

## 2022-08-22 ENCOUNTER — PATIENT MESSAGE (OUTPATIENT)
Dept: INFECTIOUS DISEASES | Facility: CLINIC | Age: 47
End: 2022-08-22
Payer: MEDICARE

## 2022-08-22 LAB — T PALLIDUM AB SER QL IF: REACTIVE

## 2022-08-23 ENCOUNTER — PATIENT MESSAGE (OUTPATIENT)
Dept: OTOLARYNGOLOGY | Facility: CLINIC | Age: 47
End: 2022-08-23
Payer: MEDICARE

## 2022-08-23 ENCOUNTER — PATIENT MESSAGE (OUTPATIENT)
Dept: INFECTIOUS DISEASES | Facility: CLINIC | Age: 47
End: 2022-08-23
Payer: MEDICARE

## 2022-08-23 LAB
FINAL PATHOLOGIC DIAGNOSIS: NORMAL
Lab: NORMAL

## 2022-08-25 ENCOUNTER — PATIENT MESSAGE (OUTPATIENT)
Dept: INFECTIOUS DISEASES | Facility: CLINIC | Age: 47
End: 2022-08-25
Payer: MEDICARE

## 2022-08-30 ENCOUNTER — PATIENT MESSAGE (OUTPATIENT)
Dept: INFECTIOUS DISEASES | Facility: CLINIC | Age: 47
End: 2022-08-30
Payer: MEDICARE

## 2022-09-09 ENCOUNTER — OFFICE VISIT (OUTPATIENT)
Dept: OPHTHALMOLOGY | Facility: CLINIC | Age: 47
End: 2022-09-09
Payer: MEDICARE

## 2022-09-09 DIAGNOSIS — H43.89 VITRITIS: ICD-10-CM

## 2022-09-09 DIAGNOSIS — A52.71 OCULAR SYPHILIS: Primary | ICD-10-CM

## 2022-09-09 DIAGNOSIS — H47.10 OPTIC DISC EDEMA: ICD-10-CM

## 2022-09-09 DIAGNOSIS — Z21 HIV POSITIVE: ICD-10-CM

## 2022-09-09 DIAGNOSIS — H43.813 VITREOUS DEGENERATION OF BOTH EYES: ICD-10-CM

## 2022-09-09 PROBLEM — H43.12 VITREOUS HEMORRHAGE, LEFT: Status: ACTIVE | Noted: 2022-09-09

## 2022-09-09 PROCEDURE — 92134 CPTRZ OPH DX IMG PST SGM RTA: CPT | Mod: PBBFAC | Performed by: OPHTHALMOLOGY

## 2022-09-09 PROCEDURE — 99213 OFFICE O/P EST LOW 20 MIN: CPT | Mod: PBBFAC | Performed by: OPHTHALMOLOGY

## 2022-09-09 PROCEDURE — 99999 PR PBB SHADOW E&M-EST. PATIENT-LVL III: CPT | Mod: PBBFAC,,, | Performed by: OPHTHALMOLOGY

## 2022-09-09 PROCEDURE — 92134 OCT, RETINA - OU - BOTH EYES: ICD-10-PCS | Mod: 26,S$PBB,, | Performed by: OPHTHALMOLOGY

## 2022-09-09 PROCEDURE — 99999 PR PBB SHADOW E&M-EST. PATIENT-LVL III: ICD-10-PCS | Mod: PBBFAC,,, | Performed by: OPHTHALMOLOGY

## 2022-09-09 PROCEDURE — 99214 PR OFFICE/OUTPT VISIT, EST, LEVL IV, 30-39 MIN: ICD-10-PCS | Mod: S$PBB,,, | Performed by: OPHTHALMOLOGY

## 2022-09-09 PROCEDURE — 99214 OFFICE O/P EST MOD 30 MIN: CPT | Mod: S$PBB,,, | Performed by: OPHTHALMOLOGY

## 2022-09-09 NOTE — PROGRESS NOTES
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 HIV positive  Z21 V08   3. Vitritis  H43.89 379.29   4. 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 Ocular syphilis  2. HIV positive  3. Vitritis  4. Optic disc edema  Pt with several months of floaters, worsened since July  HIV +, undetectable viral load, CD4 5/2022- 870    Pt girlfriend tested positive for syphilis  Pt has had mouth sores also    RPR 1:128 8/17/22, quant gold neg  Has been on penicillin infusion per ID coordination since our visit 8/18/22, now complete    Exam today VA better OU with improved disc edema OU with improved mild vitritis, mild areas of vasculitis with heme OS but no sam sheathing just heme    Plan: observe for now, improving heme and disc edema now off treatment, will recheck in 4 weeks, has appt with infectious disease next week    RTC 4 week DFE/OCTm OU        I saw and examined the patient and reviewed in detail the findings documented. The final examination findings, image interpretations, and plan as documented in the record represent my personal judgment and conclusions.    Alexey Cabezas MD  Vitreoretinal Surgery   [facility]

## 2022-09-13 ENCOUNTER — SPECIALTY PHARMACY (OUTPATIENT)
Dept: PHARMACY | Facility: CLINIC | Age: 47
End: 2022-09-13
Payer: MEDICARE

## 2022-09-13 DIAGNOSIS — Z21 HIV POSITIVE: ICD-10-CM

## 2022-09-13 RX ORDER — EFAVIRENZ, EMTRICITABINE AND TENOFOVIR DISOPROXIL FUMARATE 600; 200; 300 MG/1; MG/1; MG/1
1 TABLET, FILM COATED ORAL NIGHTLY
Qty: 30 TABLET | Refills: 11 | Status: SHIPPED | OUTPATIENT
Start: 2022-09-13 | End: 2023-06-14

## 2022-09-14 ENCOUNTER — CLINICAL SUPPORT (OUTPATIENT)
Dept: INFECTIOUS DISEASES | Facility: CLINIC | Age: 47
End: 2022-09-14
Payer: MEDICARE

## 2022-09-14 NOTE — PROGRESS NOTES
Patient received Cabenuva 600/900 mg loading dose  Pt tolerated well. Pt asked to wait in the clinic 15 minutes after injection in the event of an allergic reaction. Pt verbalized understanding. Pt left in NAD.    Initial bp 9:32 136/85  HR 90  T 98.4    BP rechecked at 9:56  HR 82  T 82

## 2022-09-19 ENCOUNTER — PATIENT MESSAGE (OUTPATIENT)
Dept: PHARMACY | Facility: CLINIC | Age: 47
End: 2022-09-19
Payer: MEDICARE

## 2022-09-22 ENCOUNTER — PATIENT MESSAGE (OUTPATIENT)
Dept: INFECTIOUS DISEASES | Facility: CLINIC | Age: 47
End: 2022-09-22
Payer: MEDICARE

## 2022-09-22 NOTE — TELEPHONE ENCOUNTER
Outgoing call to patient for refill of Atripla.  Patient stated that he is not taking Atripla anymore.  Patient will be receiving Cabenuva from now on.  First dose documented on 09/14/22 in provider office.  Will close patient out at [facility].

## 2022-10-14 ENCOUNTER — CLINICAL SUPPORT (OUTPATIENT)
Dept: INFECTIOUS DISEASES | Facility: CLINIC | Age: 47
End: 2022-10-14
Payer: MEDICARE

## 2022-10-14 DIAGNOSIS — A52.71 OCULAR SYPHILIS: ICD-10-CM

## 2022-10-14 PROCEDURE — 99999 PR PBB SHADOW E&M-EST. PATIENT-LVL I: CPT | Mod: PBBFAC,,,

## 2022-10-14 PROCEDURE — 99211 OFF/OP EST MAY X REQ PHY/QHP: CPT | Mod: PBBFAC

## 2022-10-14 PROCEDURE — 99999 PR PBB SHADOW E&M-EST. PATIENT-LVL I: ICD-10-PCS | Mod: PBBFAC,,,

## 2022-10-14 NOTE — PROGRESS NOTES
Patient received  the second loading dose of Cabenuva injections in bilateral buttocks. Pt tolerated well. Pt asked to wait in the clinic 15 minutes after injection in the event of an allergic reaction. Pt verbalized understanding. Pt left in NAD.        Pregnant or planning to be pregnant in the next 28 days? N/A    Any of these symptoms since last injection:     Jaundice No    Dark urine No    Light colored stool No    nausea or vomiting  No    loss of appetite  No    pain in right side of abdomen No    Itching No    feelings of sadness or hopelessness  No    anxious or restless No    any thoughts of hurting yourself  No    any new medications added since last inj herbal/OTC/prescription?  No      9:35   BP: 137/84  93    9:46  136/82  92

## 2022-12-12 ENCOUNTER — PATIENT MESSAGE (OUTPATIENT)
Dept: INFECTIOUS DISEASES | Facility: CLINIC | Age: 47
End: 2022-12-12
Payer: MEDICARE

## 2022-12-12 ENCOUNTER — PATIENT MESSAGE (OUTPATIENT)
Dept: ORTHOPEDICS | Facility: CLINIC | Age: 47
End: 2022-12-12
Payer: MEDICARE

## 2022-12-14 ENCOUNTER — CLINICAL SUPPORT (OUTPATIENT)
Dept: INFECTIOUS DISEASES | Facility: CLINIC | Age: 47
End: 2022-12-14
Payer: MEDICARE

## 2022-12-14 VITALS — SYSTOLIC BLOOD PRESSURE: 140 MMHG | DIASTOLIC BLOOD PRESSURE: 84 MMHG | TEMPERATURE: 98 F | HEART RATE: 79 BPM

## 2022-12-14 DIAGNOSIS — Z21 HIV POSITIVE: Primary | ICD-10-CM

## 2022-12-14 PROCEDURE — 96372 THER/PROPH/DIAG INJ SC/IM: CPT | Mod: PBBFAC

## 2022-12-14 PROCEDURE — 99213 OFFICE O/P EST LOW 20 MIN: CPT | Mod: PBBFAC

## 2022-12-14 PROCEDURE — 99999 PR PBB SHADOW E&M-EST. PATIENT-LVL III: ICD-10-PCS | Mod: PBBFAC,,,

## 2022-12-14 PROCEDURE — 99999 PR PBB SHADOW E&M-EST. PATIENT-LVL III: CPT | Mod: PBBFAC,,,

## 2022-12-14 RX ADMIN — CABOTEGRAVIR AND RILPIVIRINE 6 ML: KIT at 11:12

## 2022-12-14 NOTE — PROGRESS NOTES
Pregnant or planning to be pregnant in the next 28 days? N/a    Any of these symptoms since last injection:    Jaundice no    Dark urine no    Light colored stool no    nausea or vomiting no    loss of appetite no    pain in right side of abdomen no    Itching no    feelings of sadness or hopelessness no    anxious or restless no    any thoughts of hurting yourself no    any new medications added since last inj herbal/OTC/prescription?  No    Patient received Cabenuva 600/900 mg one IM injection to each buttocks.  Tolerated well and left in NAD Message sent to Dr Millan regarding lab visit for patient

## 2022-12-22 DIAGNOSIS — M79.641 BILATERAL HAND PAIN: Primary | ICD-10-CM

## 2022-12-22 DIAGNOSIS — M79.642 BILATERAL HAND PAIN: Primary | ICD-10-CM

## 2023-01-13 ENCOUNTER — OFFICE VISIT (OUTPATIENT)
Dept: SURGERY | Facility: CLINIC | Age: 48
End: 2023-01-13
Payer: MEDICARE

## 2023-01-13 VITALS
SYSTOLIC BLOOD PRESSURE: 163 MMHG | DIASTOLIC BLOOD PRESSURE: 87 MMHG | HEIGHT: 76 IN | HEART RATE: 90 BPM | BODY MASS INDEX: 29.53 KG/M2 | OXYGEN SATURATION: 99 % | WEIGHT: 242.5 LBS

## 2023-01-13 DIAGNOSIS — L02.212 ABSCESS OF LOWER BACK: Primary | ICD-10-CM

## 2023-01-13 PROCEDURE — 10061 I&D ABSCESS COMP/MULTIPLE: CPT | Mod: S$PBB,,, | Performed by: STUDENT IN AN ORGANIZED HEALTH CARE EDUCATION/TRAINING PROGRAM

## 2023-01-13 PROCEDURE — 99213 OFFICE O/P EST LOW 20 MIN: CPT | Mod: PBBFAC,25 | Performed by: STUDENT IN AN ORGANIZED HEALTH CARE EDUCATION/TRAINING PROGRAM

## 2023-01-13 PROCEDURE — 99203 OFFICE O/P NEW LOW 30 MIN: CPT | Mod: 25,S$PBB,, | Performed by: STUDENT IN AN ORGANIZED HEALTH CARE EDUCATION/TRAINING PROGRAM

## 2023-01-13 PROCEDURE — 10061 PR DRAIN SKIN ABSCESS COMPLIC: ICD-10-PCS | Mod: S$PBB,,, | Performed by: STUDENT IN AN ORGANIZED HEALTH CARE EDUCATION/TRAINING PROGRAM

## 2023-01-13 PROCEDURE — 99999 PR PBB SHADOW E&M-EST. PATIENT-LVL III: ICD-10-PCS | Mod: PBBFAC,,, | Performed by: STUDENT IN AN ORGANIZED HEALTH CARE EDUCATION/TRAINING PROGRAM

## 2023-01-13 PROCEDURE — 99203 PR OFFICE/OUTPT VISIT, NEW, LEVL III, 30-44 MIN: ICD-10-PCS | Mod: 25,S$PBB,, | Performed by: STUDENT IN AN ORGANIZED HEALTH CARE EDUCATION/TRAINING PROGRAM

## 2023-01-13 PROCEDURE — 99999 PR PBB SHADOW E&M-EST. PATIENT-LVL III: CPT | Mod: PBBFAC,,, | Performed by: STUDENT IN AN ORGANIZED HEALTH CARE EDUCATION/TRAINING PROGRAM

## 2023-01-13 PROCEDURE — 10061 I&D ABSCESS COMP/MULTIPLE: CPT | Mod: PBBFAC | Performed by: STUDENT IN AN ORGANIZED HEALTH CARE EDUCATION/TRAINING PROGRAM

## 2023-01-13 NOTE — PROGRESS NOTES
General Surgery Office Visit   History and Physical    Patient Name: Moisés Koehler  YOB: 1975 (47 y.o.)  MRN: [id number]  Today's Date: 01/13/2023    Referring Md:   Aaareferral Self  No address on file    SUBJECTIVE:     Chief Complaint: Back abscess    History of Present Illness:  Moisés Koehler is a 47 y.o. male with past medical history of medically controlled HIV and GERD presenting with complaints of a left lower back lesion. He was worried it could be a lymph node, as he has had lymph node resections in his groin before. The lesion has been present for 2 weeks and has slowly been getting more painful, erythematous, and large over the past 2 weeks. His girlfriend tried to drain it yesterday and was only able to get out a small amount of foul smelling draining.    He does not take any blood thinners. No allergies. No fevers, chills, nausea, vomiting, chest pain, or shortness of breath today.    Review of patient's allergies indicates:  No Known Allergies  Past Medical History:   Diagnosis Date    AC joint dislocation     Arthritis     GERD (gastroesophageal reflux disease) 2008    HIV (human immunodeficiency virus infection)     Hypertension      Past Surgical History:   Procedure Laterality Date    ARTHROSCOPIC DEBRIDEMENT OF SHOULDER Left 1/14/2019    Procedure: DEBRIDEMENT SLAP, SHOULDER, ARTHROSCOPIC;  Surgeon: Rakesh العلي MD;  Location: [facility];  Service: Orthopedics;  Laterality: Left;  regional w/o catheter     ARTHROSCOPY OF SHOULDER WITH DECOMPRESSION OF SUBACROMIAL SPACE Left 1/14/2019    Procedure: ARTHROSCOPY, SHOULDER, WITH SUBACROMIAL DECOMPRESSION;  Surgeon: Rakesh العلي MD;  Location: [facility] OR;  Service: Orthopedics;  Laterality: Left;    COLONOSCOPY N/A 12/13/2018    Procedure: COLONOSCOPY;  Surgeon: Shaniqua Chawla MD;  Location: [address]);  Service: Endoscopy;  Laterality: N/A;  schedule as 45 minute case ASAP     COSMETIC SURGERY       FIXATION OF TENDON Left 1/14/2019    Procedure: OPEN BICEPS SUBPECTORALIS TENODESIS;  Surgeon: Rakesh العلي MD;  Location: [facility] OR;  Service: Orthopedics;  Laterality: Left;    FRACTURE SURGERY      HERNIA REPAIR      KNEE SURGERY      LYMPH NODE BIOPSY Left 3/27/2019    Procedure: BIOPSY, LYMPH NODE Left Inguinal;  Surgeon: Patrick Lauren MD;  Location: [address];  Service: General;  Laterality: Left;    RECONSTRUCTION OF FINGER Left 1/27/2021    Procedure: RECONSTRUCTION, FINGER - left small finger swan neck reconstruction;  Surgeon: Nikos Bullock MD;  Location: [place] OR;  Service: Orthopedics;  Laterality: Left;     Family History   Problem Relation Age of Onset    Stomach cancer Mother     Lung cancer Maternal Grandmother     Melanoma Neg Hx     Celiac disease Neg Hx     Cirrhosis Neg Hx     Colon cancer Neg Hx     Colon polyps Neg Hx     Crohn's disease Neg Hx     Cystic fibrosis Neg Hx     Esophageal cancer Neg Hx     Hemochromatosis Neg Hx     Inflammatory bowel disease Neg Hx     Irritable bowel syndrome Neg Hx     Liver cancer Neg Hx     Liver disease Neg Hx     Rectal cancer Neg Hx     Ulcerative colitis Neg Hx     Wilner's disease Neg Hx     Lymphoma Neg Hx     Tuberculosis Neg Hx     Scleroderma Neg Hx     Rheum arthritis Neg Hx     Multiple sclerosis Neg Hx     Lupus Neg Hx     Psoriasis Neg Hx     Skin cancer Neg Hx      Social History     Tobacco Use    Smoking status: Never    Smokeless tobacco: Never   Substance Use Topics    Alcohol use: No    Drug use: Yes     Frequency: 7.0 times per week     Types: Marijuana        Review of Systems:  Review of Systems   Constitutional:  Negative for chills and fever.   Respiratory:  Negative for cough and shortness of breath.    Cardiovascular:  Negative for chest pain.   Gastrointestinal:  Negative for abdominal pain, constipation, nausea and vomiting.   Genitourinary:  Negative for dysuria.   Neurological:  Negative for dizziness.  "    OBJECTIVE:     Vital Signs (Most Recent)  BP (!) 163/87 (BP Location: Left arm, Patient Position: Sitting)   Pulse 90   Ht 6' 4" (1.93 m)   Wt 110 kg (242 lb 8.1 oz)   SpO2 99%   BMI 29.52 kg/m²     Physical Exam:  Physical Exam  Vitals reviewed.   Constitutional:       Appearance: Normal appearance.   Cardiovascular:      Rate and Rhythm: Normal rate and regular rhythm.   Pulmonary:      Effort: Pulmonary effort is normal. No respiratory distress.   Abdominal:      Palpations: Abdomen is soft.      Tenderness: There is no abdominal tenderness.   Musculoskeletal:      Comments: Left lower back, superficial skin abscess that has come to a head, with approximately 2 cms of surrounding erythema and induration.   Skin:     General: Skin is warm.   Neurological:      General: No focal deficit present.      Mental Status: He is alert and oriented to person, place, and time.       Labs:   reviewed    Diagnostic Results:  reviewed    ASSESSMENT/PLAN:     Moisés Koehler is a 47 y.o. male presenting for evaluation of left lower back abscess. Abscess drained in clinic (see below procedure note). Consent signed prior to I+D.    - Perform dressing change tomorrow with overlying gauze dressing as needed  - Shower and clean the area daily  - Return to clinic as needed    Deon Kumar MD  [facility]        Incision and Drainage Procedure Note    Pre-operative Diagnosis: Abscess    Post-operative Diagnosis: same    Indications: Infection control and symptom relief    Anesthesia: 1% lidocaine with epinephrine    Procedure Details   The procedure, risks and complications have been discussed in detail (including, but not limited to infection, bleeding) with the patient, and the patient has signed consent to the procedure.    The skin was sterilely prepped and draped over the affected area in the usual fashion.  After adequate local anesthesia, I&D with a #15 blade was performed on the left lower back. " Purulent drainage: present  The patient was observed until stable.    Findings:  Significant amount of purulent material expressed. Wound cavity thoroughly explored and irrigated with sterile saline. Wound then dressed with gauze dressing with a corner wick within the wound.    EBL: none    Drains: none    Condition:  Stable    Complications:  none.

## 2023-01-20 ENCOUNTER — TELEPHONE (OUTPATIENT)
Dept: ORTHOPEDICS | Facility: CLINIC | Age: 48
End: 2023-01-20
Payer: MEDICARE

## 2023-01-20 ENCOUNTER — PATIENT MESSAGE (OUTPATIENT)
Dept: ORTHOPEDICS | Facility: CLINIC | Age: 48
End: 2023-01-20
Payer: MEDICARE

## 2023-01-20 NOTE — TELEPHONE ENCOUNTER
Unable to contact patient at the phone numbers listed to inform of XR appointment.      Isabelle Mendoza MA  Medical Assistant to Dr. Ross Dunbar [facility]

## 2023-02-07 ENCOUNTER — PATIENT MESSAGE (OUTPATIENT)
Dept: INFECTIOUS DISEASES | Facility: CLINIC | Age: 48
End: 2023-02-07
Payer: MEDICARE

## 2023-02-08 ENCOUNTER — LAB VISIT (OUTPATIENT)
Dept: LAB | Facility: HOSPITAL | Age: 48
End: 2023-02-08
Attending: INTERNAL MEDICINE
Payer: MEDICARE

## 2023-02-08 ENCOUNTER — CLINICAL SUPPORT (OUTPATIENT)
Dept: INFECTIOUS DISEASES | Facility: CLINIC | Age: 48
End: 2023-02-08
Payer: MEDICARE

## 2023-02-08 DIAGNOSIS — Z21 HIV POSITIVE: ICD-10-CM

## 2023-02-08 LAB
ALBUMIN SERPL BCP-MCNC: 4.3 G/DL (ref 3.5–5.2)
ALP SERPL-CCNC: 63 U/L (ref 55–135)
ALT SERPL W/O P-5'-P-CCNC: 26 U/L (ref 10–44)
ANION GAP SERPL CALC-SCNC: 11 MMOL/L (ref 8–16)
AST SERPL-CCNC: 36 U/L (ref 10–40)
BASOPHILS # BLD AUTO: 0.03 K/UL (ref 0–0.2)
BASOPHILS NFR BLD: 0.4 % (ref 0–1.9)
BILIRUB SERPL-MCNC: 0.8 MG/DL (ref 0.1–1)
BUN SERPL-MCNC: 16 MG/DL (ref 6–20)
CALCIUM SERPL-MCNC: 9.9 MG/DL (ref 8.7–10.5)
CHLORIDE SERPL-SCNC: 103 MMOL/L (ref 95–110)
CO2 SERPL-SCNC: 27 MMOL/L (ref 23–29)
CREAT SERPL-MCNC: 0.9 MG/DL (ref 0.5–1.4)
DIFFERENTIAL METHOD: NORMAL
EOSINOPHIL # BLD AUTO: 0.1 K/UL (ref 0–0.5)
EOSINOPHIL NFR BLD: 1.1 % (ref 0–8)
ERYTHROCYTE [DISTWIDTH] IN BLOOD BY AUTOMATED COUNT: 13.9 % (ref 11.5–14.5)
EST. GFR  (NO RACE VARIABLE): >60 ML/MIN/1.73 M^2
GLUCOSE SERPL-MCNC: 92 MG/DL (ref 70–110)
HCT VFR BLD AUTO: 47.3 % (ref 40–54)
HGB BLD-MCNC: 15.8 G/DL (ref 14–18)
IMM GRANULOCYTES # BLD AUTO: 0.02 K/UL (ref 0–0.04)
IMM GRANULOCYTES NFR BLD AUTO: 0.3 % (ref 0–0.5)
LYMPHOCYTES # BLD AUTO: 2.6 K/UL (ref 1–4.8)
LYMPHOCYTES NFR BLD: 34.9 % (ref 18–48)
MCH RBC QN AUTO: 29.9 PG (ref 27–31)
MCHC RBC AUTO-ENTMCNC: 33.4 G/DL (ref 32–36)
MCV RBC AUTO: 90 FL (ref 82–98)
MONOCYTES # BLD AUTO: 0.6 K/UL (ref 0.3–1)
MONOCYTES NFR BLD: 7.8 % (ref 4–15)
NEUTROPHILS # BLD AUTO: 4.1 K/UL (ref 1.8–7.7)
NEUTROPHILS NFR BLD: 55.5 % (ref 38–73)
NRBC BLD-RTO: 0 /100 WBC
PLATELET # BLD AUTO: 220 K/UL (ref 150–450)
PMV BLD AUTO: 9.2 FL (ref 9.2–12.9)
POTASSIUM SERPL-SCNC: 4.6 MMOL/L (ref 3.5–5.1)
PROT SERPL-MCNC: 8 G/DL (ref 6–8.4)
RBC # BLD AUTO: 5.28 M/UL (ref 4.6–6.2)
SODIUM SERPL-SCNC: 141 MMOL/L (ref 136–145)
WBC # BLD AUTO: 7.34 K/UL (ref 3.9–12.7)

## 2023-02-08 PROCEDURE — 85025 COMPLETE CBC W/AUTO DIFF WBC: CPT | Performed by: INTERNAL MEDICINE

## 2023-02-08 PROCEDURE — 87536 HIV-1 QUANT&REVRSE TRNSCRPJ: CPT | Performed by: INTERNAL MEDICINE

## 2023-02-08 PROCEDURE — 86593 SYPHILIS TEST NON-TREP QUANT: CPT | Performed by: INTERNAL MEDICINE

## 2023-02-08 PROCEDURE — 36415 COLL VENOUS BLD VENIPUNCTURE: CPT | Performed by: INTERNAL MEDICINE

## 2023-02-08 PROCEDURE — 86361 T CELL ABSOLUTE COUNT: CPT | Performed by: INTERNAL MEDICINE

## 2023-02-08 PROCEDURE — 86780 TREPONEMA PALLIDUM: CPT | Performed by: INTERNAL MEDICINE

## 2023-02-08 PROCEDURE — 86592 SYPHILIS TEST NON-TREP QUAL: CPT | Performed by: INTERNAL MEDICINE

## 2023-02-08 PROCEDURE — 96372 THER/PROPH/DIAG INJ SC/IM: CPT | Mod: PBBFAC

## 2023-02-08 PROCEDURE — 80053 COMPREHEN METABOLIC PANEL: CPT | Performed by: INTERNAL MEDICINE

## 2023-02-08 RX ADMIN — CABOTEGRAVIR AND RILPIVIRINE 6 ML: KIT at 11:02

## 2023-02-08 NOTE — PROGRESS NOTES
"  Patient received the Cabenuva 600/900 dosing in the bilateral buttocks (ventro-gluteal site)  . Pt tolerated well. Pt asked to wait in the clinic 15 minutes after injection in the event of an allergic reaction. Pt verbalized understanding. Pt left in NAD.      Pregnant or planning to be pregnant in the next 28 days? N/A    Any of these symptoms since last injection:    Jaundice No    Dark urine urine is "more orange"    Light colored stool No    nausea or vomiting No    loss of appetite No    pain in right side of abdomen No    Itching No     feelings of sadness or hopelessness  No    anxious or restless No    any thoughts of hurting yourself  No    any new medications added since last inj herbal/OTC/prescription?  Started  a probiotic        Vitals:  /81    Temp 98.4    Wt 244 lb      "

## 2023-02-09 LAB
CD3+CD4+ CELLS # BLD: 967 CELLS/UL (ref 300–1400)
CD3+CD4+ CELLS NFR BLD: 34.4 % (ref 28–57)
HIV1 RNA # SERPL NAA+PROBE: NOT DETECTED COPIES/ML
HIV1 RNA SERPL QL NAA+PROBE: NOT DETECTED
RPR SER QL: REACTIVE
RPR SER-TITR: ABNORMAL {TITER}

## 2023-02-10 LAB — T PALLIDUM AB SER QL IF: REACTIVE

## 2023-02-16 ENCOUNTER — OFFICE VISIT (OUTPATIENT)
Dept: INFECTIOUS DISEASES | Facility: CLINIC | Age: 48
End: 2023-02-16
Payer: MEDICARE

## 2023-02-16 DIAGNOSIS — B35.4 RINGWORM OF BODY: ICD-10-CM

## 2023-02-16 DIAGNOSIS — R63.5 RECENT WEIGHT GAIN: ICD-10-CM

## 2023-02-16 DIAGNOSIS — A52.71 OCULAR SYPHILIS: Primary | ICD-10-CM

## 2023-02-16 DIAGNOSIS — H93.13 TINNITUS OF BOTH EARS: ICD-10-CM

## 2023-02-16 DIAGNOSIS — Z21 HIV POSITIVE: ICD-10-CM

## 2023-02-16 PROCEDURE — 99213 OFFICE O/P EST LOW 20 MIN: CPT | Mod: 95,,, | Performed by: INTERNAL MEDICINE

## 2023-02-16 PROCEDURE — 99213 PR OFFICE/OUTPT VISIT, EST, LEVL III, 20-29 MIN: ICD-10-PCS | Mod: 95,,, | Performed by: INTERNAL MEDICINE

## 2023-02-16 RX ORDER — KETOCONAZOLE 20 MG/G
CREAM TOPICAL DAILY
Qty: 15 G | Refills: 1 | Status: SHIPPED | OUTPATIENT
Start: 2023-02-16

## 2023-02-16 NOTE — PROGRESS NOTES
"The patient location is: work  The chief complaint leading to consultation is: follow up on ocular syphilis and HIV    Visit type: audiovisual    Face to Face time with patient: 10 minutes  20 minutes of total time spent on the encounter, which includes face to face time and non-face to face time preparing to see the patient (eg, review of tests), Obtaining and/or reviewing separately obtained history, Documenting clinical information in the electronic or other health record, Independently interpreting results (not separately reported) and communicating results to the patient/family/caregiver, or Care coordination (not separately reported).         Each patient to whom he or she provides medical services by telemedicine is:  (1) informed of the relationship between the physician and patient and the respective role of any other health care provider with respect to management of the patient; and (2) notified that he or she may decline to receive medical services by telemedicine and may withdraw from such care at any time.    Notes:     1. Ocular syphilis    2. HIV positive    3. Recent weight gain    4. Ringworm of body    5. Tinnitus of both ears      RPR improved from 1:128 to 1:4. Vision improved - less blurry.  Still has significant tinnitus.   Feels better overall.    Gained about 15 pounds with Cabenuva  Not sure about fat, fluid, or muscle gain  Still taking serostim    Notes "circles" on legs - sometimes itching  Worried about blood pressures - 136/85 when coming for injections  Explained that this is probably from stress.    HIV undetectable, CD4 normal    Will start antifungal cream, continue Cabenuva  Follow up with audiology for tinnitus if hearing does not improve  "

## 2023-04-06 ENCOUNTER — CLINICAL SUPPORT (OUTPATIENT)
Dept: INFECTIOUS DISEASES | Facility: CLINIC | Age: 48
End: 2023-04-06
Payer: MEDICARE

## 2023-04-06 VITALS
HEART RATE: 86 BPM | TEMPERATURE: 98 F | DIASTOLIC BLOOD PRESSURE: 77 MMHG | BODY MASS INDEX: 29.52 KG/M2 | SYSTOLIC BLOOD PRESSURE: 134 MMHG | WEIGHT: 242.5 LBS

## 2023-04-06 PROCEDURE — 99999 PR PBB SHADOW E&M-EST. PATIENT-LVL III: ICD-10-PCS | Mod: PBBFAC,,,

## 2023-04-06 PROCEDURE — 99999 PR PBB SHADOW E&M-EST. PATIENT-LVL III: CPT | Mod: PBBFAC,,,

## 2023-04-06 PROCEDURE — 99213 OFFICE O/P EST LOW 20 MIN: CPT | Mod: PBBFAC

## 2023-04-06 RX ADMIN — CABOTEGRAVIR AND RILPIVIRINE 6 ML: KIT at 11:04

## 2023-04-06 NOTE — PROGRESS NOTES
Are you pregnant or planning to be pregnant in the next 28 days? no  Have you had any of these symptoms since last injection: no  Jaundice  Dark urine  Light colored stool  Nausea or vomiting  Loss of appetite  Pain in right side of abdomen  Itching  Feelings of sadness or hopelessness  Anxious or restless  Any thoughts of hurting yourself    Have you started taking any new medications since last injection herbal/OTC/prescription? no    Patient received Cabenuva 600mg / 900mg IM in each buttock, pt tolerated well and left clinic NAD after observation.

## 2023-05-18 ENCOUNTER — PATIENT MESSAGE (OUTPATIENT)
Dept: INFECTIOUS DISEASES | Facility: CLINIC | Age: 48
End: 2023-05-18
Payer: MEDICARE

## 2023-05-22 DIAGNOSIS — Z21 HIV POSITIVE: Primary | ICD-10-CM

## 2023-05-22 DIAGNOSIS — R21 RASH OF BOTH HANDS: ICD-10-CM

## 2023-05-22 RX ORDER — DOXYCYCLINE HYCLATE 100 MG
100 TABLET ORAL 2 TIMES DAILY
Qty: 20 TABLET | Refills: 0 | Status: SHIPPED | OUTPATIENT
Start: 2023-05-22 | End: 2023-06-01

## 2023-05-24 ENCOUNTER — OFFICE VISIT (OUTPATIENT)
Dept: DERMATOLOGY | Facility: CLINIC | Age: 48
End: 2023-05-24
Payer: MEDICARE

## 2023-05-24 DIAGNOSIS — Z76.89 ENCOUNTER FOR SKIN CARE: Primary | ICD-10-CM

## 2023-05-24 DIAGNOSIS — D22.9 BENIGN MOLE: ICD-10-CM

## 2023-05-24 DIAGNOSIS — L40.9 PSORIASIS: ICD-10-CM

## 2023-05-24 DIAGNOSIS — R21 RASH OF BOTH HANDS: ICD-10-CM

## 2023-05-24 DIAGNOSIS — Z21 HIV POSITIVE: ICD-10-CM

## 2023-05-24 PROCEDURE — 99999 PR PBB SHADOW E&M-EST. PATIENT-LVL IV: ICD-10-PCS | Mod: PBBFAC,,, | Performed by: DERMATOLOGY

## 2023-05-24 PROCEDURE — 99999 PR PBB SHADOW E&M-EST. PATIENT-LVL IV: CPT | Mod: PBBFAC,,, | Performed by: DERMATOLOGY

## 2023-05-24 PROCEDURE — 99204 OFFICE O/P NEW MOD 45 MIN: CPT | Mod: S$PBB,,, | Performed by: DERMATOLOGY

## 2023-05-24 PROCEDURE — 99204 PR OFFICE/OUTPT VISIT, NEW, LEVL IV, 45-59 MIN: ICD-10-PCS | Mod: S$PBB,,, | Performed by: DERMATOLOGY

## 2023-05-24 PROCEDURE — 99214 OFFICE O/P EST MOD 30 MIN: CPT | Mod: PBBFAC,PN | Performed by: DERMATOLOGY

## 2023-05-24 RX ORDER — CLOBETASOL PROPIONATE 0.5 MG/G
CREAM TOPICAL 2 TIMES DAILY
Qty: 60 G | Refills: 1 | Status: SHIPPED | OUTPATIENT
Start: 2023-05-24 | End: 2023-06-20 | Stop reason: SDUPTHER

## 2023-05-24 NOTE — PROGRESS NOTES
Subjective:      Patient ID:  Moisés Koehler is a 47 y.o. male who presents for   Chief Complaint   Patient presents with    Dry Skin     Hands and lower legs      Dry Skin - Initial  Affected locations: left hand, right hand, left fingers and right fingers  Signs / symptoms: dryness  Severity: mild to moderate  Timing: constant      Review of Systems   Constitutional: Negative.    HENT: Negative.     Respiratory: Negative.     Musculoskeletal: Negative.    Skin:  Positive for dry skin.     Objective:   Physical Exam   Constitutional: He appears well-developed and well-nourished.   Neurological: He is alert.   Psychiatric: He has a normal mood and affect.   Skin:                 Diagram Legend     Erythematous scaling macule/papule c/w actinic keratosis       Vascular papule c/w angioma      Pigmented verrucoid papule/plaque c/w seborrheic keratosis      Yellow umbilicated papule c/w sebaceous hyperplasia      Irregularly shaped tan macule c/w lentigo     1-2 mm smooth white papules consistent with Milia      Movable subcutaneous cyst with punctum c/w epidermal inclusion cyst      Subcutaneous movable cyst c/w pilar cyst      Firm pink to brown papule c/w dermatofibroma      Pedunculated fleshy papule(s) c/w skin tag(s)      Evenly pigmented macule c/w junctional nevus     Mildly variegated pigmented, slightly irregular-bordered macule c/w mildly atypical nevus      Flesh colored to evenly pigmented papule c/w intradermal nevus       Pink pearly papule/plaque c/w basal cell carcinoma      Erythematous hyperkeratotic cursted plaque c/w SCC      Surgical scar with no sign of skin cancer recurrence      Open and closed comedones      Inflammatory papules and pustules      Verrucoid papule consistent consistent with wart     Erythematous eczematous patches and plaques     Dystrophic onycholytic nail with subungual debris c/w onychomycosis     Umbilicated papule    Erythematous-base heme-crusted tan verrucoid  plaque consistent with inflamed seborrheic keratosis     Erythematous Silvery Scaling Plaque c/w Psoriasis     See annotation      Assessment / Plan:        Encounter for skin care  Good skin care regimen discussed including limiting to one bath or shower per day, using lukewarm water with mild soap and moisturization to skin once to twice daily.  Consider glycerin bar soap or Dove.  Consider organic coconut oil.    HIV positive  -     Ambulatory referral/consult to Dermatology  Previous [facility] labs and or records and notes reviewed and considered for their impact on our clinical decision making today.    Rash of both hands  -     Ambulatory referral/consult to Dermatology  -     clobetasoL (TEMOVATE) 0.05 % cream; Apply topically 2 (two) times daily. Prn rash.Stop using steroid topical when skin is smooth and non itchy.  Do not treat dark or red coloring.  Dispense: 60 g; Refill: 1  Most likely psoriasis.  Discussed all of the following:  Psoriasis is an inflammatory condition that affects the skin and nails. You may have patches of thick, red skin (plaques) covered with silvery scales. These often appear on the elbows, knees, legs, lower back, and scalp.  The plaques itch and can be painful. People with this condition are more likely to have emotional stress and depression.  Psoriasis is not contagious. It cant spread to someone else who touches it. But it can be inherited. It is an autoimmune skin disease. This means that the immune system has an abnormal reaction. It treats healthy skin like it is a foreign substance. This causes skin cells to grow faster than normal and to stack up in raised red patches. Psoriasis is a long-term (chronic) disease. You will have flare-ups that come and go over time.  Psoriasis is made worse by smoking, alcohol, stress, and some blood pressure medications.  Brochure given for patient education.  Reviewed with patient different treatment options and associated risks.  Discussed  with patient the etiology and pathogenesis of the disease or skin lesion(s) and possible treatments and aggravators.    Proper application of medications and or care for affected area(s) and condition(s) reviewed.  Discussed with patient the risks of topical and injectable steroids, including, but not limited, to atrophy, rosacea, acne, glaucoma, cataracts, adrenal suppression, striae.  Do not touch eyes with medication.  Apply zinc spray on top of topical steroids regularly, if applicable.  When rash is doing well, stop topical steroid and continue zinc spray as a moisturizer.  Or use daily as moisturizer as directed.  Sandals or slippers at home as not to slide around and risk fall on non carpeted floors if applied to the soles.    Benign mole  Discussed with patient the benign nature of these lesions and that no treatment is indicated.    Psoriasis  -     clobetasoL (TEMOVATE) 0.05 % cream; Apply topically 2 (two) times daily. Prn rash.Stop using steroid topical when skin is smooth and non itchy.  Do not treat dark or red coloring.  Dispense: 60 g; Refill: 1  Reviewed with patient different treatment options and associated risks.  UVB therapy as optional treatment reviewed.  To be done at [facility].  Theoretical risks about future skin cancer reviewed.  Consider this later?  Apply zinc spray on top of topical steroids regularly, if applicable.  When rash is doing well, stop topical steroid and continue zinc spray as a moisturizer.  Or use daily as moisturizer as directed.  Sandals or slippers at home as not to slide around and risk fall on non carpeted floors if applied to the soles.    Consider labs later?           Follow up in about 3 months (around 8/24/2023).

## 2023-05-24 NOTE — PATIENT INSTRUCTIONS
Avoid hot water   Mild soap like Dove   Moisturize with coconut oil   Apply zinc spray on top of topical steroids regularly, if applicable.  When rash is doing well, stop topical steroid and continue zinc spray as a moisturizer.  Or use daily as moisturizer as directed.  Sandals or slippers at home as not to slide around and risk fall on non carpeted floors if applied to the soles.    Clobetasol as needed       Recommended more hand  than water washing for routine germ prevention.    Shower sooner than later after exercise, exertion, or sweating.  Can do wash cloth wipes if more convenient.  Sweat can cause irritation and may exacerbate skin conditions.  Use corn starch as a drying powder.

## 2023-05-26 ENCOUNTER — TELEPHONE (OUTPATIENT)
Dept: INFECTIOUS DISEASES | Facility: CLINIC | Age: 48
End: 2023-05-26
Payer: MEDICARE

## 2023-06-02 ENCOUNTER — PATIENT MESSAGE (OUTPATIENT)
Dept: INFECTIOUS DISEASES | Facility: CLINIC | Age: 48
End: 2023-06-02
Payer: MEDICARE

## 2023-06-02 ENCOUNTER — TELEPHONE (OUTPATIENT)
Dept: INFECTIOUS DISEASES | Facility: CLINIC | Age: 48
End: 2023-06-02
Payer: MEDICARE

## 2023-06-02 ENCOUNTER — CLINICAL SUPPORT (OUTPATIENT)
Dept: INFECTIOUS DISEASES | Facility: CLINIC | Age: 48
End: 2023-06-02
Payer: MEDICARE

## 2023-06-02 PROCEDURE — 96372 THER/PROPH/DIAG INJ SC/IM: CPT | Mod: PBBFAC

## 2023-06-02 PROCEDURE — 99213 OFFICE O/P EST LOW 20 MIN: CPT | Mod: PBBFAC

## 2023-06-02 RX ADMIN — CABOTEGRAVIR AND RILPIVIRINE 6 ML: KIT at 12:06

## 2023-06-02 NOTE — PROGRESS NOTES
Patient received the Bi-monthly dose of Cabenuva 600/900 in the bilateral buttocks. Pt tolerated well. Pt asked to wait in the clinic 15 minutes after injection in the event of an allergic reaction. Pt verbalized understanding. Pt left in NAD.      /87, HR 87, temp 98.4, Wt 242 lb    Pregnant or planning to be pregnant in the next 28 days?    Any of these symptoms since last injection:    Jaundice No    Dark urine No    Light colored stool No    nausea or vomiting No    loss of appetite No    pain in right side of abdomen No    Itching No    feelings of sadness or hopelessness No    anxious or restless No    any thoughts of hurting yourself No    any new medications added since last inj herbal/OTC/prescription? Yes, Prozac 20 mg daily

## 2023-06-07 ENCOUNTER — PATIENT MESSAGE (OUTPATIENT)
Dept: DERMATOLOGY | Facility: CLINIC | Age: 48
End: 2023-06-07
Payer: MEDICARE

## 2023-06-09 ENCOUNTER — LAB VISIT (OUTPATIENT)
Dept: LAB | Facility: HOSPITAL | Age: 48
End: 2023-06-09
Payer: MEDICARE

## 2023-06-09 DIAGNOSIS — Z21 HIV POSITIVE: ICD-10-CM

## 2023-06-09 LAB
ALBUMIN SERPL BCP-MCNC: 4 G/DL (ref 3.5–5.2)
ALP SERPL-CCNC: 82 U/L (ref 55–135)
ALT SERPL W/O P-5'-P-CCNC: 24 U/L (ref 10–44)
ANION GAP SERPL CALC-SCNC: 9 MMOL/L (ref 8–16)
AST SERPL-CCNC: 32 U/L (ref 10–40)
BILIRUB SERPL-MCNC: 0.5 MG/DL (ref 0.1–1)
BUN SERPL-MCNC: 17 MG/DL (ref 6–20)
CALCIUM SERPL-MCNC: 9.8 MG/DL (ref 8.7–10.5)
CD3+CD4+ CELLS # BLD: 845 CELLS/UL (ref 300–1400)
CD3+CD4+ CELLS NFR BLD: 34.9 % (ref 28–57)
CHLORIDE SERPL-SCNC: 103 MMOL/L (ref 95–110)
CO2 SERPL-SCNC: 30 MMOL/L (ref 23–29)
CREAT SERPL-MCNC: 1.1 MG/DL (ref 0.5–1.4)
EST. GFR  (NO RACE VARIABLE): >60 ML/MIN/1.73 M^2
GLUCOSE SERPL-MCNC: 99 MG/DL (ref 70–110)
POTASSIUM SERPL-SCNC: 4.4 MMOL/L (ref 3.5–5.1)
PROT SERPL-MCNC: 7.9 G/DL (ref 6–8.4)
SODIUM SERPL-SCNC: 142 MMOL/L (ref 136–145)

## 2023-06-09 PROCEDURE — 36415 COLL VENOUS BLD VENIPUNCTURE: CPT | Performed by: INTERNAL MEDICINE

## 2023-06-09 PROCEDURE — 87536 HIV-1 QUANT&REVRSE TRNSCRPJ: CPT | Performed by: INTERNAL MEDICINE

## 2023-06-09 PROCEDURE — 86361 T CELL ABSOLUTE COUNT: CPT | Performed by: INTERNAL MEDICINE

## 2023-06-09 PROCEDURE — 80053 COMPREHEN METABOLIC PANEL: CPT | Performed by: INTERNAL MEDICINE

## 2023-06-12 ENCOUNTER — PATIENT MESSAGE (OUTPATIENT)
Dept: INFECTIOUS DISEASES | Facility: CLINIC | Age: 48
End: 2023-06-12
Payer: MEDICARE

## 2023-06-12 LAB
HIV1 RNA # SERPL NAA+PROBE: <20 COPIES/ML
HIV1 RNA SERPL NAA+PROBE-LOG#: <1.3 LOG COPIES/ML
HIV1 RNA SERPL QL NAA+PROBE: DETECTED

## 2023-06-13 ENCOUNTER — PATIENT MESSAGE (OUTPATIENT)
Dept: GASTROENTEROLOGY | Facility: CLINIC | Age: 48
End: 2023-06-13
Payer: MEDICARE

## 2023-06-13 ENCOUNTER — TELEPHONE (OUTPATIENT)
Dept: GASTROENTEROLOGY | Facility: CLINIC | Age: 48
End: 2023-06-13
Payer: MEDICARE

## 2023-06-13 ENCOUNTER — PATIENT MESSAGE (OUTPATIENT)
Dept: INFECTIOUS DISEASES | Facility: CLINIC | Age: 48
End: 2023-06-13
Payer: MEDICARE

## 2023-06-13 DIAGNOSIS — R10.9 ABDOMINAL PAIN, UNSPECIFIED ABDOMINAL LOCATION: Primary | ICD-10-CM

## 2023-06-13 DIAGNOSIS — R10.13 EPIGASTRIC PAIN: ICD-10-CM

## 2023-06-13 DIAGNOSIS — K62.5 RECTAL BLEEDING: ICD-10-CM

## 2023-06-13 DIAGNOSIS — K64.8 OTHER HEMORRHOIDS: ICD-10-CM

## 2023-06-13 DIAGNOSIS — K92.1 MELENA: ICD-10-CM

## 2023-06-13 NOTE — TELEPHONE ENCOUNTER
----- Message from Shaniqua Chawla MD sent at 6/12/2023 11:20 AM CDT -----  Regarding: FW: Hello! a patient complaining of melena and bleeding  Please call and get symptom update on patient.  Last saw me in 2019.    Likely needs stool studies including calprotectin, cx, c diff, O/P  HIV positive  Rectal swab/colonoscopy +/- EGD depending on symptoms  We can discuss     SS  ----- Message -----  From: Smooth Millan MD  Sent: 6/12/2023  10:11 AM CDT  To: Shaniqua Chawla MD  Subject: [place]! a patient complaining of melena and b#    Shaniqua,  I hope you are doing well - we should catch up on all the current events going on here sometime    Just giving you a heads up. You saw this patient in 2018, and he had a normal colon. He sent me a message today that he was having melena and bleeding. I sent him to the ED. He had a history of UC, and I see him for HIV. If he reaches out to you, you'll know what's going on - but I think this is more general GI than inflammatory.     Let me know when we can talk. Smooth.

## 2023-06-13 NOTE — TELEPHONE ENCOUNTER
Plan:  - Labs & stool sample now (calprotectin +/- C. Diff, cultures, & O/P if diarrhea)  - Colonoscopy & EGD w/ rectal swab

## 2023-06-14 ENCOUNTER — LAB VISIT (OUTPATIENT)
Dept: LAB | Facility: HOSPITAL | Age: 48
End: 2023-06-14
Attending: INTERNAL MEDICINE
Payer: MEDICARE

## 2023-06-14 ENCOUNTER — TELEPHONE (OUTPATIENT)
Dept: ENDOSCOPY | Facility: HOSPITAL | Age: 48
End: 2023-06-14
Payer: MEDICARE

## 2023-06-14 ENCOUNTER — OFFICE VISIT (OUTPATIENT)
Dept: SURGERY | Facility: CLINIC | Age: 48
End: 2023-06-14
Payer: MEDICARE

## 2023-06-14 ENCOUNTER — PATIENT MESSAGE (OUTPATIENT)
Dept: ENDOSCOPY | Facility: HOSPITAL | Age: 48
End: 2023-06-14
Payer: MEDICARE

## 2023-06-14 VITALS
BODY MASS INDEX: 28.9 KG/M2 | SYSTOLIC BLOOD PRESSURE: 147 MMHG | WEIGHT: 237.31 LBS | HEIGHT: 76 IN | DIASTOLIC BLOOD PRESSURE: 86 MMHG | HEART RATE: 78 BPM

## 2023-06-14 DIAGNOSIS — K64.8 INTERNAL HEMORRHOIDS: ICD-10-CM

## 2023-06-14 DIAGNOSIS — K62.5 RECTAL BLEEDING: ICD-10-CM

## 2023-06-14 DIAGNOSIS — K92.1 MELENA: ICD-10-CM

## 2023-06-14 DIAGNOSIS — K62.5 RECTAL BLEEDING: Primary | ICD-10-CM

## 2023-06-14 DIAGNOSIS — R10.13 EPIGASTRIC PAIN: ICD-10-CM

## 2023-06-14 LAB
ALBUMIN SERPL BCP-MCNC: 4.2 G/DL (ref 3.5–5.2)
ALP SERPL-CCNC: 83 U/L (ref 55–135)
ALT SERPL W/O P-5'-P-CCNC: 27 U/L (ref 10–44)
AST SERPL-CCNC: 38 U/L (ref 10–40)
BASOPHILS # BLD AUTO: 0.03 K/UL (ref 0–0.2)
BASOPHILS NFR BLD: 0.4 % (ref 0–1.9)
BILIRUB DIRECT SERPL-MCNC: 0.2 MG/DL (ref 0.1–0.3)
BILIRUB SERPL-MCNC: 0.5 MG/DL (ref 0.1–1)
DIFFERENTIAL METHOD: ABNORMAL
EOSINOPHIL # BLD AUTO: 0.1 K/UL (ref 0–0.5)
EOSINOPHIL NFR BLD: 1.2 % (ref 0–8)
ERYTHROCYTE [DISTWIDTH] IN BLOOD BY AUTOMATED COUNT: 13.2 % (ref 11.5–14.5)
HCT VFR BLD AUTO: 48.8 % (ref 40–54)
HGB BLD-MCNC: 15.9 G/DL (ref 14–18)
IMM GRANULOCYTES # BLD AUTO: 0.02 K/UL (ref 0–0.04)
IMM GRANULOCYTES NFR BLD AUTO: 0.3 % (ref 0–0.5)
LIPASE SERPL-CCNC: 28 U/L (ref 4–60)
LYMPHOCYTES # BLD AUTO: 2.4 K/UL (ref 1–4.8)
LYMPHOCYTES NFR BLD: 34.1 % (ref 18–48)
MCH RBC QN AUTO: 29.2 PG (ref 27–31)
MCHC RBC AUTO-ENTMCNC: 32.6 G/DL (ref 32–36)
MCV RBC AUTO: 90 FL (ref 82–98)
MONOCYTES # BLD AUTO: 0.5 K/UL (ref 0.3–1)
MONOCYTES NFR BLD: 7.3 % (ref 4–15)
NEUTROPHILS # BLD AUTO: 3.9 K/UL (ref 1.8–7.7)
NEUTROPHILS NFR BLD: 56.7 % (ref 38–73)
NRBC BLD-RTO: 0 /100 WBC
PLATELET # BLD AUTO: 256 K/UL (ref 150–450)
PMV BLD AUTO: 9 FL (ref 9.2–12.9)
PROT SERPL-MCNC: 8 G/DL (ref 6–8.4)
RBC # BLD AUTO: 5.44 M/UL (ref 4.6–6.2)
WBC # BLD AUTO: 6.94 K/UL (ref 3.9–12.7)

## 2023-06-14 PROCEDURE — 99999 PR PBB SHADOW E&M-EST. PATIENT-LVL IV: CPT | Mod: PBBFAC,,, | Performed by: NURSE PRACTITIONER

## 2023-06-14 PROCEDURE — 99213 OFFICE O/P EST LOW 20 MIN: CPT | Mod: S$PBB,25,ICN, | Performed by: NURSE PRACTITIONER

## 2023-06-14 PROCEDURE — 99999 PR PBB SHADOW E&M-EST. PATIENT-LVL IV: ICD-10-PCS | Mod: PBBFAC,,, | Performed by: NURSE PRACTITIONER

## 2023-06-14 PROCEDURE — 46600 DIAGNOSTIC ANOSCOPY SPX: CPT | Mod: S$PBB,ICN,, | Performed by: NURSE PRACTITIONER

## 2023-06-14 PROCEDURE — 99214 OFFICE O/P EST MOD 30 MIN: CPT | Mod: PBBFAC | Performed by: NURSE PRACTITIONER

## 2023-06-14 PROCEDURE — 36415 COLL VENOUS BLD VENIPUNCTURE: CPT | Performed by: INTERNAL MEDICINE

## 2023-06-14 PROCEDURE — 83690 ASSAY OF LIPASE: CPT | Performed by: INTERNAL MEDICINE

## 2023-06-14 PROCEDURE — 46600 PR DIAG2STIC A2SCOPY: ICD-10-PCS | Mod: S$PBB,ICN,, | Performed by: NURSE PRACTITIONER

## 2023-06-14 PROCEDURE — 99213 PR OFFICE/OUTPT VISIT, EST, LEVL III, 20-29 MIN: ICD-10-PCS | Mod: S$PBB,25,ICN, | Performed by: NURSE PRACTITIONER

## 2023-06-14 PROCEDURE — 80076 HEPATIC FUNCTION PANEL: CPT | Performed by: INTERNAL MEDICINE

## 2023-06-14 PROCEDURE — 85025 COMPLETE CBC W/AUTO DIFF WBC: CPT | Performed by: INTERNAL MEDICINE

## 2023-06-14 PROCEDURE — 46600 DIAGNOSTIC ANOSCOPY SPX: CPT | Mod: PBBFAC | Performed by: NURSE PRACTITIONER

## 2023-06-14 RX ORDER — HYDROCORTISONE 25 MG/G
CREAM TOPICAL 2 TIMES DAILY
Qty: 28 G | Refills: 2 | Status: SHIPPED | OUTPATIENT
Start: 2023-06-14 | End: 2023-06-26 | Stop reason: SDUPTHER

## 2023-06-14 NOTE — TELEPHONE ENCOUNTER
"Contacted patient regarding procedure(s) requested by referring provider, Dr. Chawla. Patient did not answer. Left message for the patient to return call to my direct number at [phone].       Image of   [employer] message from physician:      Melonie Goldman, RN  P [facility]  Caller: Unspecified (Yesterday,  2:06 PM)  Procedure: EGD/Colonoscopy w/ rectal swab     Diagnosis: Rectal bleeding and Melena     Procedure Timin-4 weeks     *If within 4 weeks selected, please osmar as high priority*     *If greater than 12 weeks, please select "4-12 weeks" and delay sending until 2 months prior to requested date*     Provider: Dr. ESVIN Chawla     Location: [address]     Additional Scheduling Information: No scheduling concerns     Prep Specifications:Standard prep     Have you attached a patient to this message: Yes    "

## 2023-06-14 NOTE — PROGRESS NOTES
CRS Office Visit History and Physical    Referring Md:   Smooth Millan Md  [address] [place] [address]    SUBJECTIVE:     Chief Complaint: hemorrhoid    History of Present Illness:  The patient is new patient to this practice.   Course is as follows:  Patient is a 47 y.o. male presents with rectal bleeding and abdominal pain.  Reports possible hemorrhoid. Present for > 1 yr, worse in the past 2 weeks.   +bleeding and prolapse with bm. +clots.   Also reports epigastric abdominal pain. Reports intermittent for years, however more constant recently.  On daily ppi and states reflux/heartburn worse if forgets a dose.     Has tried prep h without improvement.  Associated bleeding: yes  Previous anorectal procedures: No  confirms straining/prolonged time on toilet with bowel movements.  is not currently taking fiber supplement or stool softener. Bm 3-5x/day loose vs hard.   Blood thinners: No    Last Colonoscopy completed on 12/13/2018  - Rectal swab for chlamydia and gonorrhea done today.   - The entire examined colon is normal. Biopsied.   - The examined portion of the ileum was normal. Biopsied.   - repeat for screening      Review of patient's allergies indicates:  No Known Allergies    Past Medical History:   Diagnosis Date    AC joint dislocation     Arthritis     GERD (gastroesophageal reflux disease) 2008    HIV (human immunodeficiency virus infection)     Hypertension      Past Surgical History:   Procedure Laterality Date    ARTHROSCOPIC DEBRIDEMENT OF SHOULDER Left 1/14/2019    Procedure: DEBRIDEMENT SLAP, SHOULDER, ARTHROSCOPIC;  Surgeon: Rakesh العلي MD;  Location: [facility];  Service: Orthopedics;  Laterality: Left;  regional w/o catheter     ARTHROSCOPY OF SHOULDER WITH DECOMPRESSION OF SUBACROMIAL SPACE Left 1/14/2019    Procedure: ARTHROSCOPY, SHOULDER, WITH SUBACROMIAL DECOMPRESSION;  Surgeon: Rakesh العلي MD;  Location: [facility];  Service: Orthopedics;  Laterality: Left;     COLONOSCOPY N/A 12/13/2018    Procedure: COLONOSCOPY;  Surgeon: Shaniqua Chawla MD;  Location: [facility] ENDO (4TH FLR);  Service: Endoscopy;  Laterality: N/A;  schedule as 45 minute case ASAP     COSMETIC SURGERY      FIXATION OF TENDON Left 1/14/2019    Procedure: OPEN BICEPS SUBPECTORALIS TENODESIS;  Surgeon: Rakesh العلي MD;  Location: [facility];  Service: Orthopedics;  Laterality: Left;    FRACTURE SURGERY      HERNIA REPAIR      KNEE SURGERY      LYMPH NODE BIOPSY Left 3/27/2019    Procedure: BIOPSY, LYMPH NODE Left Inguinal;  Surgeon: Patrick Lauren MD;  Location: [facility] 2ND FLR;  Service: General;  Laterality: Left;    RECONSTRUCTION OF FINGER Left 1/27/2021    Procedure: RECONSTRUCTION, FINGER - left small finger swan neck reconstruction;  Surgeon: Nikos Bullock MD;  Location: [facility];  Service: Orthopedics;  Laterality: Left;     Family History   Problem Relation Age of Onset    Stomach cancer Mother     Lung cancer Maternal Grandmother     Melanoma Neg Hx     Celiac disease Neg Hx     Cirrhosis Neg Hx     Colon cancer Neg Hx     Colon polyps Neg Hx     Crohn's disease Neg Hx     Cystic fibrosis Neg Hx     Esophageal cancer Neg Hx     Hemochromatosis Neg Hx     Inflammatory bowel disease Neg Hx     Irritable bowel syndrome Neg Hx     Liver cancer Neg Hx     Liver disease Neg Hx     Rectal cancer Neg Hx     Ulcerative colitis Neg Hx     Wilner's disease Neg Hx     Lymphoma Neg Hx     Tuberculosis Neg Hx     Scleroderma Neg Hx     Rheum arthritis Neg Hx     Multiple sclerosis Neg Hx     Lupus Neg Hx     Psoriasis Neg Hx     Skin cancer Neg Hx      Social History     Tobacco Use    Smoking status: Never    Smokeless tobacco: Never   Substance Use Topics    Alcohol use: No    Drug use: Yes     Frequency: 7.0 times per week     Types: Marijuana        Review of Systems:  Review of Systems   Gastrointestinal:  Positive for blood in stool and melena.     OBJECTIVE:     Vital Signs (Most Recent)  Blood  "Pressure (Abnormal) 147/86 (BP Location: Left arm, Patient Position: Sitting, BP Method: Large (Automatic))   Pulse 78   Height 6' 4" (1.93 m)   Weight 107.6 kg (237 lb 4.8 oz)   Body Mass Index 28.89 kg/m²     Physical Exam:  General: White male in no distress   Neuro: Alert and oriented to person, place, and time.  Moves all extremities.     HEENT: No icterus.  Trachea midline  Respiratory: Respirations are even and unlabored, no cough or audible wheezing  Skin: Warm dry and intact, No visible rashes, no jaundice    Labs reviewed today:  Lab Results   Component Value Date    WBC 7.34 02/08/2023    HGB 15.8 02/08/2023    HCT 47.3 02/08/2023     02/08/2023    CHOL 174 03/27/2018    TRIG 118 03/27/2018    HDL 46 03/27/2018    ALT 24 06/09/2023    AST 32 06/09/2023     06/09/2023    K 4.4 06/09/2023     06/09/2023    CREATININE 1.1 06/09/2023    BUN 17 06/09/2023    CO2 30 (H) 06/09/2023    TSH 1.324 11/17/2020    INR 0.9 12/27/2016       Imaging reviewed today:  1/3/20 CT chest abdomen pelvis  - No convincing adenopathy identified.    - Multiple nonenlarged nodes present.    - There is mild hepatomegaly slightly increased compared to prior.       Endoscopy reviewed today:  Last Colonoscopy completed on 12/13/2018  - Rectal swab for chlamydia and gonorrhea done today.   - The entire examined colon is normal. Biopsied.   - The examined portion of the ileum was normal. Biopsied.   - repeat for screening    Anorectal Exam:    Anal Skin: External hemorrhoids    Digital Rectal Exam:  Resting Tone normal  Mass none  Tenderness  absent    Anoscopy:  Verbal consent was obtained.   Clear plastic anoscope inserted.    Hemorrhoids  present  Stigmata of bleeding  present  Stigmata of prolapsed  present  Distal rectal mucosa normal      ASSESSMENT/PLAN:     Diagnoses and all orders for this visit:    Rectal bleeding  -     hydrocortisone (ANUSOL-HC) 2.5 % rectal cream; Place rectally 2 (two) times " daily.    Internal hemorrhoids  -     hydrocortisone (ANUSOL-HC) 2.5 % rectal cream; Place rectally 2 (two) times daily.        The patient was instructed to:  Egd/colonoscopy, labs, and stool studies previously ordered. Meeting with [facility]  today.   Ansuol internally/externally 2x/day for 2 weeks  Increase water intake to at least 8-10 glasses of water per day.  Take a daily fiber supplement (Citrucel, Adrianne's Tummy Fiber) and increase dietary intake to 20-30 grams/day.  Avoid straining or spending >5min/event with bowel movements.  Follow-up in clinic prn pending above for possible RBL if desired.      Farideh Jarrett, FREDRICK-C  Colon and Rectal Surgery

## 2023-06-14 NOTE — TELEPHONE ENCOUNTER
"Melonie Goldman RN  P [facility] Patients  Caller: Unspecified (Yesterday,  2:06 PM)  Procedure: EGD/Colonoscopy w/ rectal swab     Diagnosis: Rectal bleeding and Melena     Procedure Timin-4 weeks     *If within 4 weeks selected, please osmar as high priority*     *If greater than 12 weeks, please select "4-12 weeks" and delay sending until 2 months prior to requested date*     Provider: Dr. ESVIN Chawla     Location: [address]     Additional Scheduling Information: No scheduling concerns     Prep Specifications:Standard prep     Have you attached a patient to this message: Yes   "

## 2023-06-14 NOTE — PATIENT INSTRUCTIONS
Schedule EGD/colonoscopy Dr. Chawla ordered  Stool study.   Anusol cream internally/externally 2x/day for 2 weeks. Can use again in future if needed, just need to have taken at least a 2 week break between uses.  Daily fiber supplement. Citrucel or Adrianne's Tummy fiber.  Water intake > 64 oz/day.  No sitting/straining > 5 mins with bowel movements.

## 2023-06-16 RX ORDER — DOLUTEGRAVIR SODIUM AND LAMIVUDINE 50; 300 MG/1; MG/1
1 TABLET, FILM COATED ORAL DAILY
Qty: 30 TABLET | Refills: 5 | Status: ACTIVE | OUTPATIENT
Start: 2023-06-16 | End: 2023-07-28 | Stop reason: ALTCHOICE

## 2023-06-19 ENCOUNTER — PATIENT MESSAGE (OUTPATIENT)
Dept: INFECTIOUS DISEASES | Facility: CLINIC | Age: 48
End: 2023-06-19
Payer: MEDICARE

## 2023-06-20 ENCOUNTER — OFFICE VISIT (OUTPATIENT)
Dept: DERMATOLOGY | Facility: CLINIC | Age: 48
End: 2023-06-20
Payer: MEDICARE

## 2023-06-20 ENCOUNTER — SPECIALTY PHARMACY (OUTPATIENT)
Dept: PHARMACY | Facility: CLINIC | Age: 48
End: 2023-06-20
Payer: MEDICARE

## 2023-06-20 DIAGNOSIS — L40.9 PSORIASIS: Primary | ICD-10-CM

## 2023-06-20 DIAGNOSIS — Z21 HIV POSITIVE: ICD-10-CM

## 2023-06-20 DIAGNOSIS — R21 RASH OF BOTH HANDS: ICD-10-CM

## 2023-06-20 DIAGNOSIS — Z51.81 MEDICATION MONITORING ENCOUNTER: ICD-10-CM

## 2023-06-20 DIAGNOSIS — Z76.89 ENCOUNTER FOR SKIN CARE: ICD-10-CM

## 2023-06-20 PROCEDURE — 99213 OFFICE O/P EST LOW 20 MIN: CPT | Mod: PBBFAC,PN | Performed by: DERMATOLOGY

## 2023-06-20 PROCEDURE — 99215 OFFICE O/P EST HI 40 MIN: CPT | Mod: S$PBB,,, | Performed by: DERMATOLOGY

## 2023-06-20 PROCEDURE — 99215 PR OFFICE/OUTPT VISIT, EST, LEVL V, 40-54 MIN: ICD-10-PCS | Mod: S$PBB,,, | Performed by: DERMATOLOGY

## 2023-06-20 PROCEDURE — 99999 PR PBB SHADOW E&M-EST. PATIENT-LVL III: CPT | Mod: PBBFAC,,, | Performed by: DERMATOLOGY

## 2023-06-20 PROCEDURE — 99999 PR PBB SHADOW E&M-EST. PATIENT-LVL III: ICD-10-PCS | Mod: PBBFAC,,, | Performed by: DERMATOLOGY

## 2023-06-20 RX ORDER — CLOBETASOL PROPIONATE 0.5 MG/G
CREAM TOPICAL 2 TIMES DAILY
Qty: 60 G | Refills: 1 | Status: SHIPPED | OUTPATIENT
Start: 2023-06-20 | End: 2023-08-01 | Stop reason: SDUPTHER

## 2023-06-20 RX ORDER — APREMILAST 10-20-30MG
KIT ORAL
Qty: 56 TABLET | Refills: 0 | Status: ACTIVE | OUTPATIENT
Start: 2023-06-20 | End: 2023-08-01

## 2023-06-20 NOTE — PROGRESS NOTES
Subjective:      Patient ID:  Moisés Koehler is a 47 y.o. male who presents for   Chief Complaint   Patient presents with    Psoriasis     Follow up     Psoriasis - Follow-up  Symptom course: worsening  Affected locations: left lower leg, right lower leg, left upper leg, right upper leg, left hand and right hand  Signs / symptoms: itching, inflamed, scaling, redness and bleeding  Severity: moderate    Review of Systems   Constitutional:  Negative for fever, chills and fatigue.   HENT:  Negative for sore throat.    Respiratory:  Negative for cough.    Musculoskeletal: Negative.    Skin:  Positive for rash and dry skin.     Objective:   Physical Exam   Constitutional: He appears well-developed and well-nourished.   Neurological: He is alert and oriented to person, place, and time.   Psychiatric: He has a normal mood and affect.      Diagram Legend     Erythematous scaling macule/papule c/w actinic keratosis       Vascular papule c/w angioma      Pigmented verrucoid papule/plaque c/w seborrheic keratosis      Yellow umbilicated papule c/w sebaceous hyperplasia      Irregularly shaped tan macule c/w lentigo     1-2 mm smooth white papules consistent with Milia      Movable subcutaneous cyst with punctum c/w epidermal inclusion cyst      Subcutaneous movable cyst c/w pilar cyst      Firm pink to brown papule c/w dermatofibroma      Pedunculated fleshy papule(s) c/w skin tag(s)      Evenly pigmented macule c/w junctional nevus     Mildly variegated pigmented, slightly irregular-bordered macule c/w mildly atypical nevus      Flesh colored to evenly pigmented papule c/w intradermal nevus       Pink pearly papule/plaque c/w basal cell carcinoma      Erythematous hyperkeratotic cursted plaque c/w SCC      Surgical scar with no sign of skin cancer recurrence      Open and closed comedones      Inflammatory papules and pustules      Verrucoid papule consistent consistent with wart     Erythematous eczematous patches and  plaques     Dystrophic onycholytic nail with subungual debris c/w onychomycosis     Umbilicated papule    Erythematous-base heme-crusted tan verrucoid plaque consistent with inflamed seborrheic keratosis     Erythematous Silvery Scaling Plaque c/w Psoriasis     See annotation        Lower legs with scatt'd psoriasis small patches and macules.  Hands pretty clear.    Assessment / Plan:        Psoriasis  -     OTEZLA STARTER 10 mg (4)-20 mg (4)-30 mg (47) DsPk; Titration: Take 1 tablet po bid as directed  Dispense: 56 tablet; Refill: 0  Discussed with patient the etiology and pathogenesis of the disease or skin lesion(s) and possible treatments and aggravators.    Reviewed with patient different treatment options and associated risks.  Partial response to clob but new spots keep coming.  Discussed the benefits and risks of Otezla including but not limited to nausea, vomiting, diarrhea, weight loss, and depression.    Start Otezla at 30mg bid after standard tapering up to this dose.    Otezla is a pregnancy category C and dose needs to be reduced in patients with severe renal impairment (Cr cl < 30ml/min)    Otezla is not recommended with strong [id number] enzyme inducers ie rifamin and anti-epileptics)  Refill and restart clob bid prn.  Cont zinc spray.  Apply zinc spray on top of topical steroids regularly, if applicable.  When rash is doing well, stop topical steroid and continue zinc spray as a moisturizer.  Or use daily as moisturizer as directed.  Sandals or slippers at home as not to slide around and risk fall on non carpeted floors if applied to the soles.    UVB therapy as optional treatment reviewed.  To be done at [facility].  Theoretical risks about future skin cancer reviewed.  Pt would like to consider if otezla not working.  Pt wants otezla.    Consider extra lab king at  appt?    Encounter for skin care  Good skin care regimen discussed including limiting to one bath or shower per day, using lukewarm  water with mild soap and moisturization to skin once to twice daily.  Consider glycerin bar soap or Dove.  Consider organic coconut oil.'    HIV positive  Avoid biologics for now.  Avoid more immunosuppress.    Medication monitoring encounter  Chem 20 and cbc good from 6.23.  Repeat labs later.    Lues  Had tx.  Rev'd skin lesions not lues.               Follow up in about 6 weeks (around 8/1/2023).

## 2023-06-20 NOTE — TELEPHONE ENCOUNTER
Dovato test claim - $0 copay. No PA or FA required. Benefits investigation: [employer] - Medicare D - LIS LVL1.

## 2023-06-26 ENCOUNTER — PATIENT MESSAGE (OUTPATIENT)
Dept: DERMATOLOGY | Facility: CLINIC | Age: 48
End: 2023-06-26
Payer: MEDICARE

## 2023-06-26 ENCOUNTER — PATIENT MESSAGE (OUTPATIENT)
Dept: SURGERY | Facility: CLINIC | Age: 48
End: 2023-06-26
Payer: MEDICARE

## 2023-06-26 DIAGNOSIS — K64.8 INTERNAL HEMORRHOIDS: ICD-10-CM

## 2023-06-26 DIAGNOSIS — K62.5 RECTAL BLEEDING: ICD-10-CM

## 2023-06-26 RX ORDER — HYDROCORTISONE 25 MG/G
CREAM TOPICAL 2 TIMES DAILY
Qty: 28 G | Refills: 2 | Status: SHIPPED | OUTPATIENT
Start: 2023-06-26

## 2023-06-27 ENCOUNTER — ANESTHESIA EVENT (OUTPATIENT)
Dept: ENDOSCOPY | Facility: HOSPITAL | Age: 48
End: 2023-06-27
Payer: MEDICARE

## 2023-06-27 ENCOUNTER — HOSPITAL ENCOUNTER (OUTPATIENT)
Facility: HOSPITAL | Age: 48
Discharge: HOME | End: 2023-06-27
Attending: INTERNAL MEDICINE | Admitting: INTERNAL MEDICINE
Payer: MEDICARE

## 2023-06-27 ENCOUNTER — ANESTHESIA (OUTPATIENT)
Dept: ENDOSCOPY | Facility: HOSPITAL | Age: 48
End: 2023-06-27
Payer: MEDICARE

## 2023-06-27 VITALS
SYSTOLIC BLOOD PRESSURE: 137 MMHG | TEMPERATURE: 98 F | OXYGEN SATURATION: 97 % | HEIGHT: 76 IN | HEART RATE: 77 BPM | DIASTOLIC BLOOD PRESSURE: 88 MMHG | RESPIRATION RATE: 18 BRPM | WEIGHT: 240 LBS | BODY MASS INDEX: 29.22 KG/M2

## 2023-06-27 DIAGNOSIS — K62.5 RECTAL BLEEDING: ICD-10-CM

## 2023-06-27 DIAGNOSIS — Z21 HIV POSITIVE: Primary | ICD-10-CM

## 2023-06-27 DIAGNOSIS — K92.1 MELENA: ICD-10-CM

## 2023-06-27 PROCEDURE — 25000003 PHARM REV CODE 250: Performed by: NURSE ANESTHETIST, CERTIFIED REGISTERED

## 2023-06-27 PROCEDURE — 45378 DIAGNOSTIC COLONOSCOPY: CPT | Performed by: INTERNAL MEDICINE

## 2023-06-27 PROCEDURE — 43235 PR EGD, FLEX, DIAGNOSTIC: ICD-10-PCS | Mod: 51,,, | Performed by: INTERNAL MEDICINE

## 2023-06-27 PROCEDURE — 37000008 HC ANESTHESIA 1ST 15 MINUTES: Performed by: INTERNAL MEDICINE

## 2023-06-27 PROCEDURE — E9220 PRA ENDO ANESTHESIA: HCPCS | Mod: ,,, | Performed by: NURSE ANESTHETIST, CERTIFIED REGISTERED

## 2023-06-27 PROCEDURE — 45378 DIAGNOSTIC COLONOSCOPY: CPT | Mod: ,,, | Performed by: INTERNAL MEDICINE

## 2023-06-27 PROCEDURE — 37000009 HC ANESTHESIA EA ADD 15 MINS: Performed by: INTERNAL MEDICINE

## 2023-06-27 PROCEDURE — 45378 PR COLONOSCOPY,DIAGNOSTIC: ICD-10-PCS | Mod: ,,, | Performed by: INTERNAL MEDICINE

## 2023-06-27 PROCEDURE — 87491 CHLMYD TRACH DNA AMP PROBE: CPT | Performed by: INTERNAL MEDICINE

## 2023-06-27 PROCEDURE — 63600175 PHARM REV CODE 636 W HCPCS: Performed by: NURSE ANESTHETIST, CERTIFIED REGISTERED

## 2023-06-27 PROCEDURE — 43235 EGD DIAGNOSTIC BRUSH WASH: CPT | Performed by: INTERNAL MEDICINE

## 2023-06-27 PROCEDURE — 43235 EGD DIAGNOSTIC BRUSH WASH: CPT | Mod: 51,,, | Performed by: INTERNAL MEDICINE

## 2023-06-27 PROCEDURE — E9220 PRA ENDO ANESTHESIA: ICD-10-PCS | Mod: ,,, | Performed by: NURSE ANESTHETIST, CERTIFIED REGISTERED

## 2023-06-27 RX ORDER — PROPOFOL 10 MG/ML
VIAL (ML) INTRAVENOUS CONTINUOUS PRN
Status: DISCONTINUED | OUTPATIENT
Start: 2023-06-27 | End: 2023-06-27

## 2023-06-27 RX ORDER — LIDOCAINE HYDROCHLORIDE 20 MG/ML
INJECTION INTRAVENOUS
Status: DISCONTINUED | OUTPATIENT
Start: 2023-06-27 | End: 2023-06-27

## 2023-06-27 RX ORDER — PROPOFOL 10 MG/ML
VIAL (ML) INTRAVENOUS
Status: DISCONTINUED | OUTPATIENT
Start: 2023-06-27 | End: 2023-06-27

## 2023-06-27 RX ORDER — SODIUM CHLORIDE 9 MG/ML
INJECTION, SOLUTION INTRAVENOUS CONTINUOUS
Status: DISCONTINUED | OUTPATIENT
Start: 2023-06-27 | End: 2023-06-27 | Stop reason: HOSPADM

## 2023-06-27 RX ORDER — MIDAZOLAM HYDROCHLORIDE 1 MG/ML
INJECTION, SOLUTION INTRAMUSCULAR; INTRAVENOUS
Status: DISCONTINUED | OUTPATIENT
Start: 2023-06-27 | End: 2023-06-27

## 2023-06-27 RX ADMIN — GLYCOPYRROLATE 0.1 MG: 0.2 INJECTION, SOLUTION INTRAMUSCULAR; INTRAVENOUS at 01:06

## 2023-06-27 RX ADMIN — SODIUM CHLORIDE: 9 INJECTION, SOLUTION INTRAVENOUS at 01:06

## 2023-06-27 RX ADMIN — PROPOFOL 100 MG: 10 INJECTION, EMULSION INTRAVENOUS at 02:06

## 2023-06-27 RX ADMIN — Medication 250 MCG/KG/MIN: at 02:06

## 2023-06-27 RX ADMIN — LIDOCAINE HYDROCHLORIDE 100 MG: 20 INJECTION INTRAVENOUS at 02:06

## 2023-06-27 RX ADMIN — MIDAZOLAM HYDROCHLORIDE 2 MG: 1 INJECTION, SOLUTION INTRAMUSCULAR; INTRAVENOUS at 02:06

## 2023-06-27 NOTE — ANESTHESIA PREPROCEDURE EVALUATION
06/27/2023  Moisés Koehler is a 47 y.o., male.  Past Medical History:   Diagnosis Date    AC joint dislocation     Arthritis     GERD (gastroesophageal reflux disease) 2008    HIV (human immunodeficiency virus infection)     Hypertension    '  Past Surgical History:   Procedure Laterality Date    ARTHROSCOPIC DEBRIDEMENT OF SHOULDER Left 1/14/2019    Procedure: DEBRIDEMENT SLAP, SHOULDER, ARTHROSCOPIC;  Surgeon: Rakesh العلي MD;  Location: [facility];  Service: Orthopedics;  Laterality: Left;  regional w/o catheter     ARTHROSCOPY OF SHOULDER WITH DECOMPRESSION OF SUBACROMIAL SPACE Left 1/14/2019    Procedure: ARTHROSCOPY, SHOULDER, WITH SUBACROMIAL DECOMPRESSION;  Surgeon: Rakesh العلي MD;  Location: [facility];  Service: Orthopedics;  Laterality: Left;    COLONOSCOPY N/A 12/13/2018    Procedure: COLONOSCOPY;  Surgeon: Shaniqua Chawla MD;  Location: [facility] (4TH FLR);  Service: Endoscopy;  Laterality: N/A;  schedule as 45 minute case ASAP     COSMETIC SURGERY      FIXATION OF TENDON Left 1/14/2019    Procedure: OPEN BICEPS SUBPECTORALIS TENODESIS;  Surgeon: Rakesh العلي MD;  Location: [facility];  Service: Orthopedics;  Laterality: Left;    FRACTURE SURGERY      HERNIA REPAIR      KNEE SURGERY      LYMPH NODE BIOPSY Left 3/27/2019    Procedure: BIOPSY, LYMPH NODE Left Inguinal;  Surgeon: Patrick Lauren MD;  Location: [facility] 2ND FLR;  Service: General;  Laterality: Left;    RECONSTRUCTION OF FINGER Left 1/27/2021    Procedure: RECONSTRUCTION, FINGER - left small finger swan neck reconstruction;  Surgeon: Nikos Bullock MD;  Location: [facility];  Service: Orthopedics;  Laterality: Left;         Pre-op Assessment    I have reviewed the Patient Summary Reports.     I have reviewed the Nursing Notes. I have reviewed the NPO Status.   I have reviewed the Medications.      Review of Systems  Anesthesia Hx:  No problems with previous Anesthesia  Denies Family Hx of Anesthesia complications.   Denies Personal Hx of Anesthesia complications.   Hematology/Oncology:  Hematology Normal   Oncology Normal     EENT/Dental:EENT/Dental Normal   Cardiovascular:   Hypertension    Pulmonary:   Asthma    Hepatic/GI:   Bowel Prep. GERD    Musculoskeletal:  Musculoskeletal Normal    Neurological:  Neurology Normal    Endocrine:  Endocrine Normal    Dermatological:  Skin Normal    Psych:  Psychiatric Normal           Physical Exam  General: Well nourished    Airway:  Mallampati: II   Mouth Opening: Normal  TM Distance: Normal  Tongue: Normal  Neck ROM: Normal ROM    Dental:  Intact        Anesthesia Plan  Type of Anesthesia, risks & benefits discussed:    Anesthesia Type: Gen Natural Airway  Intra-op Monitoring Plan: Standard ASA Monitors  Informed Consent: Informed consent signed with the Patient and all parties understand the risks and agree with anesthesia plan.  All questions answered.   ASA Score: 2  Day of Surgery Review of History & Physical: H&P Update referred to the surgeon/provider.I have interviewed and examined the patient. I have reviewed the patient's H&P dated: There are no significant changes.     Ready For Surgery From Anesthesia Perspective.     .

## 2023-06-27 NOTE — ANESTHESIA POSTPROCEDURE EVALUATION
Anesthesia Post Evaluation    Patient: Moisés Koehler    Procedure(s) Performed: Procedure(s) (LRB):  EGD (ESOPHAGOGASTRODUODENOSCOPY) (N/A)  COLONOSCOPY (N/A)    Final Anesthesia Type: general      Patient location during evaluation: GI PACU  Patient participation: Yes- Able to Participate  Level of consciousness: awake and alert  Post-procedure vital signs: reviewed and stable  Pain management: adequate  Airway patency: patent    PONV status at discharge: No PONV  Anesthetic complications: no      Cardiovascular status: blood pressure returned to baseline and stable  Respiratory status: unassisted, spontaneous ventilation and room air  Hydration status: euvolemic  Follow-up not needed.          Vitals Value Taken Time   /88 06/27/23 1517   Pulse 77 06/27/23 1517   Resp 18 06/27/23 1517   SpO2 97 % 06/27/23 1517         No case tracking events are documented in the log.      Pain/Ekaterina Score: Ekaterina Score: 10 (6/27/2023  3:17 PM)

## 2023-06-27 NOTE — PROVATION PATIENT INSTRUCTIONS
Discharge Summary/Instructions after an Endoscopic Procedure  Patient Name: Moisés Koehler  Patient MRN: [id number]  Patient YOB: 1975  Tuesday, June 27, 2023  Shaniqua Chawla MD  Dear patient,  As a result of recent federal legislation (The Federal Cures Act), you may   receive lab or pathology results from your procedure in your [facility]   account before your physician is able to contact you. Your physician or   their representative will relay the results to you with their   recommendations at their soonest availability.  Thank you,  RESTRICTIONS:  During your procedure today, you received medications for sedation.  These   medications may affect your judgment, balance and coordination.  Therefore,   for 24 hours, you have the following restrictions:   - DO NOT drive a car, operate machinery, make legal/financial decisions,   sign important papers or drink alcohol.    ACTIVITY:  Today: no heavy lifting, straining or running due to procedural   sedation/anesthesia.  The following day: return to full activity including work.  DIET:  Eat and drink normally unless instructed otherwise.     TREATMENT FOR COMMON SIDE EFFECTS:  - Mild abdominal pain, nausea, belching, bloating or excessive gas:  rest,   eat lightly and use a heating pad.  - Sore Throat: treat with throat lozenges and/or gargle with warm salt   water.  - Because air was used during the procedure, expelling large amounts of air   from your rectum or belching is normal.  - If a bowel prep was taken, you may not have a bowel movement for 1-3 days.    This is normal.  SYMPTOMS TO WATCH FOR AND REPORT TO YOUR PHYSICIAN:  1. Abdominal pain or bloating, other than gas cramps.  2. Chest pain.  3. Back pain.  4. Signs of infection such as: chills or fever occurring within 24 hours   after the procedure.  5. Rectal bleeding, which would show as bright red, maroon, or black stools.   (A tablespoon of blood from the rectum is not serious, especially if    hemorrhoids are present.)  6. Vomiting.  7. Weakness or dizziness.  GO DIRECTLY TO THE NEAREST EMERGENCY ROOM IF YOU HAVE ANY OF THE FOLLOWING:      Difficulty breathing              Chills and/or fever over 101 F   Persistent vomiting and/or vomiting blood   Severe abdominal pain   Severe chest pain   Black, tarry stools   Bleeding- more than one tablespoon   Any other symptom or condition that you feel may need urgent attention  Your doctor recommends these additional instructions:  If any biopsies were taken, your doctors clinic will contact you in 1 to 2   weeks with any results.  - Discharge patient to home.   - Patient has a contact number available for emergencies.  The signs and   symptoms of potential delayed complications were discussed with the   patient.  Return to normal activities tomorrow.  Written discharge   instructions were provided to the patient.   - Resume previous diet.   - Continue present medications.   - Perform a colonoscopy today.  For questions, problems or results please call your physician - Shaniqua Chawla MD at Work:  [phone].  [facility], EMERGENCY ROOM PHONE NUMBER: [phone]  IF A COMPLICATION OR EMERGENCY SITUATION ARISES AND YOU ARE UNABLE TO REACH   YOUR PHYSICIAN - GO DIRECTLY TO THE EMERGENCY ROOM.  Shaniqua Chawla MD  6/27/2023 2:11:35 PM  This report has been verified and signed electronically.  Dear patient,  As a result of recent federal legislation (The Federal Cures Act), you may   receive lab or pathology results from your procedure in your [facility]   account before your physician is able to contact you. Your physician or   their representative will relay the results to you with their   recommendations at their soonest availability.  Thank you,  PROVATION

## 2023-06-27 NOTE — TRANSFER OF CARE
"Anesthesia Transfer of Care Note    Patient: Moisés Koehler    Procedure(s) Performed: Procedure(s) (LRB):  EGD (ESOPHAGOGASTRODUODENOSCOPY) (N/A)  COLONOSCOPY (N/A)    Patient location: GI    Anesthesia Type: general    Transport from OR: Transported from OR on room air with adequate spontaneous ventilation    Post pain: adequate analgesia    Post assessment: no apparent anesthetic complications    Post vital signs: stable    Level of consciousness: awake    Nausea/Vomiting: no nausea/vomiting    Complications: none    Transfer of care protocol was followed      Last vitals:   Visit Vitals  BP (!) 147/80 (BP Location: Left arm, Patient Position: Lying)   Pulse 82   Temp 36.4 °C (97.5 °F) (Temporal)   Resp 18   Ht 6' 4" (1.93 m)   Wt 108.9 kg (240 lb)   SpO2 95%   BMI 29.21 kg/m²     "

## 2023-06-27 NOTE — H&P
Short Stay Endoscopy History and Physical    PCP - Primary Doctor No  Referring Physician - Shaniqua Chawla MD  [address] [place] [address]    Procedure - EGD and Colonoscopy  ASA - per anesthesia  Mallampati - per anesthesia  History of Anesthesia problems - no  Family history Anesthesia problems -  no   Plan of anesthesia - General    HPI  47 y.o. male    Reason for procedure:   Melena (likely peptobismol), on nexium for GERD, epigastric pain  Hemorrhoids, rectal bleeding, h/o proctitis     ROS:  Constitutional: No fevers, chills, No weight loss  CV: No chest pain  Pulm: No cough, No shortness of breath  GI: see HPI    Medical History:  has a past medical history of AC joint dislocation, Arthritis, GERD (gastroesophageal reflux disease) (2008), HIV (human immunodeficiency virus infection), and Hypertension.    Surgical History:  has a past surgical history that includes Cosmetic surgery; Hernia repair; Fracture surgery; Colonoscopy (N/A, 12/13/2018); Knee surgery; Arthroscopic debridement of shoulder (Left, 1/14/2019); Arthroscopy of shoulder with decompression of subacromial space (Left, 1/14/2019); Fixation of tendon (Left, 1/14/2019); Lymph node biopsy (Left, 3/27/2019); and Reconstruction of finger (Left, 1/27/2021).    Family History: family history includes Lung cancer in his maternal grandmother; Stomach cancer in his mother.    Social History:  reports that he has never smoked. He has never used smokeless tobacco. He reports current drug use. Frequency: 7.00 times per week. Drug: Marijuana. He reports that he does not drink alcohol.    Review of patient's allergies indicates:  No Known Allergies    Medications:   Facility-Administered Medications Prior to Admission   Medication Dose Route Frequency Provider Last Rate Last Admin    acetaminophen tablet 650 mg  650 mg Oral 1 time in Clinic/HOD Smooth Millan MD        diphenhydrAMINE capsule 25 mg  25 mg Oral 1 time in Clinic/HOD Smooth JACOBSON  "MD Monty        EPINEPHrine (EPIPEN) 0.3 mg/0.3 mL pen injection 0.3 mg  0.3 mg Intramuscular 1 time in Clinic/HOD Smooth Millan MD        ondansetron disintegrating tablet 4 mg  4 mg Oral 1 time in Clinic/HOD Smooth Millan MD        predniSONE tablet 5 mg  5 mg Oral 1 time in Clinic/HOD Smooth Millan MD         Medications Prior to Admission   Medication Sig Dispense Refill Last Dose    albuterol (PROVENTIL/VENTOLIN HFA) 90 mcg/actuation inhaler INHALE 2 PUFFS INTO THE LUNGS EVERY 6 HOURS AS NEEDED FOR WHEEZING 25.5 g 0     clobetasoL (TEMOVATE) 0.05 % cream Apply topically 2 (two) times daily as needed for rash.Stop using steroid topical when skin is smooth and non itchy.  Do not treat dark or red coloring. 60 g 1     dolutegravir-lamivudine (DOVATO)  mg Tab Take 1 tablet by mouth once daily. 30 tablet 5     hydrocortisone (ANUSOL-HC) 2.5 % rectal cream Place rectally 2 (two) times daily. 28 g 2     insulin syringe-needle U-100 1 mL 30 gauge X 7/16" Syrg Use as directed 100 each 5     ketoconazole (NIZORAL) 2 % cream Apply topically once daily. 15 g 1     lisinopriL 10 MG tablet Take 1 tablet (10 mg total) by mouth once daily. 30 tablet 11     OTEZLA STARTER 10 mg (4)-20 mg (4)-30 mg (47) DsPk Titration: Take 1 tablet po bid as directed 56 tablet 0     SEROSTIM 6 mg SolR INJECT UNDER THE SKIN EVERY DAY 28 each 11        Physical Exam:    Vital Signs:   Vitals:    06/27/23 1257   BP: 131/82   Pulse:    Resp:    Temp:        General Appearance: Well appearing in no acute distress  Abdomen: Soft, non tender, non distended with normal bowel sounds, no masses    Labs:  Lab Results   Component Value Date    WBC 6.94 06/14/2023    HGB 15.9 06/14/2023    HCT 48.8 06/14/2023     06/14/2023    CHOL 174 03/27/2018    TRIG 118 03/27/2018    HDL 46 03/27/2018    ALT 27 06/14/2023    AST 38 06/14/2023     06/09/2023    K 4.4 06/09/2023     06/09/2023    CREATININE 1.1 06/09/2023    " BUN 17 06/09/2023    CO2 30 (H) 06/09/2023    TSH 1.324 11/17/2020    INR 0.9 12/27/2016       I have explained the risks and benefits of this endoscopic procedure to the patient including but not limited to bleeding, inflammation, infection, perforation, and death.      Shaniqua Chawla MD

## 2023-06-27 NOTE — PROVATION PATIENT INSTRUCTIONS
Discharge Summary/Instructions after an Endoscopic Procedure  Patient Name: Moisés Koehler  Patient MRN: [id number]  Patient YOB: 1975  Tuesday, June 27, 2023  Shaniqua Chawla MD  Dear patient,  As a result of recent federal legislation (The Federal Cures Act), you may   receive lab or pathology results from your procedure in your [facility]   account before your physician is able to contact you. Your physician or   their representative will relay the results to you with their   recommendations at their soonest availability.  Thank you,  RESTRICTIONS:  During your procedure today, you received medications for sedation.  These   medications may affect your judgment, balance and coordination.  Therefore,   for 24 hours, you have the following restrictions:   - DO NOT drive a car, operate machinery, make legal/financial decisions,   sign important papers or drink alcohol.    ACTIVITY:  Today: no heavy lifting, straining or running due to procedural   sedation/anesthesia.  The following day: return to full activity including work.  DIET:  Eat and drink normally unless instructed otherwise.     TREATMENT FOR COMMON SIDE EFFECTS:  - Mild abdominal pain, nausea, belching, bloating or excessive gas:  rest,   eat lightly and use a heating pad.  - Sore Throat: treat with throat lozenges and/or gargle with warm salt   water.  - Because air was used during the procedure, expelling large amounts of air   from your rectum or belching is normal.  - If a bowel prep was taken, you may not have a bowel movement for 1-3 days.    This is normal.  SYMPTOMS TO WATCH FOR AND REPORT TO YOUR PHYSICIAN:  1. Abdominal pain or bloating, other than gas cramps.  2. Chest pain.  3. Back pain.  4. Signs of infection such as: chills or fever occurring within 24 hours   after the procedure.  5. Rectal bleeding, which would show as bright red, maroon, or black stools.   (A tablespoon of blood from the rectum is not serious, especially if    hemorrhoids are present.)  6. Vomiting.  7. Weakness or dizziness.  GO DIRECTLY TO THE NEAREST EMERGENCY ROOM IF YOU HAVE ANY OF THE FOLLOWING:      Difficulty breathing              Chills and/or fever over 101 F   Persistent vomiting and/or vomiting blood   Severe abdominal pain   Severe chest pain   Black, tarry stools   Bleeding- more than one tablespoon   Any other symptom or condition that you feel may need urgent attention  Your doctor recommends these additional instructions:  If any biopsies were taken, your doctors clinic will contact you in 1 to 2   weeks with any results.  - Discharge patient to home.   - Patient has a contact number available for emergencies.  The signs and   symptoms of potential delayed complications were discussed with the   patient.  Return to normal activities tomorrow.  Written discharge   instructions were provided to the patient.   - Resume previous diet.   - Continue present medications.   - Repeat colonoscopy in 10 years for screening purposes.   For questions, problems or results please call your physician - Shaniqua Chawla MD at Work:  [phone].  [facility], EMERGENCY ROOM PHONE NUMBER: [phone]  IF A COMPLICATION OR EMERGENCY SITUATION ARISES AND YOU ARE UNABLE TO REACH   YOUR PHYSICIAN - GO DIRECTLY TO THE EMERGENCY ROOM.  Shaniqua Chawla MD  6/27/2023 2:39:59 PM  This report has been verified and signed electronically.  Dear patient,  As a result of recent federal legislation (The Federal Cures Act), you may   receive lab or pathology results from your procedure in your [facility]   account before your physician is able to contact you. Your physician or   their representative will relay the results to you with their   recommendations at their soonest availability.  Thank you,  PROVATION

## 2023-06-28 LAB
C TRACH RRNA SPEC QL NAA+PROBE: NEGATIVE
N GONORRHOEA RRNA SPEC QL NAA+PROBE: NEGATIVE
N.GONORROHEAE, AMP RNA SOURCE: NORMAL
SPECIMEN SOURCE: NORMAL

## 2023-06-30 ENCOUNTER — TELEPHONE (OUTPATIENT)
Dept: PHARMACY | Facility: CLINIC | Age: 48
End: 2023-06-30
Payer: MEDICARE

## 2023-06-30 NOTE — TELEPHONE ENCOUNTER
Gigi, this is Nikos Dickerson, clinical pharmacist with [facility] that is part of your care team.  We have begun working on your prescription that your doctor has sent us. Our next steps include:     Working with your insurance company to obtain approval for your medication  Working with you to ensure your medication is affordable     We will be calling you along the way with updates on your medication but if you have any concerns or receive information that you would like to discuss please reach us at [phone].    Welcome call outcome: Left voicemail    Shirley BARRAZA submitted via [facility].

## 2023-07-05 ENCOUNTER — SPECIALTY PHARMACY (OUTPATIENT)
Dept: PHARMACY | Facility: CLINIC | Age: 48
End: 2023-07-05
Payer: MEDICARE

## 2023-07-12 ENCOUNTER — SPECIALTY PHARMACY (OUTPATIENT)
Dept: PHARMACY | Facility: CLINIC | Age: 48
End: 2023-07-12
Payer: MEDICARE

## 2023-07-12 DIAGNOSIS — L40.9 PSORIASIS: Primary | ICD-10-CM

## 2023-07-12 NOTE — TELEPHONE ENCOUNTER
"Specialty Pharmacy - Initial Clinical Assessment    Specialty Medication Orders Linked to Encounter      Flowsheet Row Most Recent Value   Medication #1 OTEZLA STARTER 10 mg (4)-20 mg (4)-30 mg (47) DsPk (Order#[id number], Rx#[id number])          Patient Diagnosis   L40.9 - Psoriasis    Subjective    Moisés Koehler is a 47 y.o. male, who is followed by the specialty pharmacy service for management and education.    Recent Encounters       Date Type Provider Description    07/12/2023 Specialty Pharmacy Nikos Dickerson, Anthony Initial Clinical Assessment    07/10/2023 Specialty Pharmacy Fabienne Jones, PharmD     07/05/2023 Specialty Pharmacy Nikos Dickerson, JudD Referral Authorization    06/20/2023 Specialty Pharmacy Kassie Salazar, PharmD Referral Authorization    09/13/2022 Specialty Pharmacy Meaghan Andrade Refill Coordination            Current Outpatient Medications   Medication Sig    albuterol (PROVENTIL/VENTOLIN HFA) 90 mcg/actuation inhaler INHALE 2 PUFFS INTO THE LUNGS EVERY 6 HOURS AS NEEDED FOR WHEEZING    clobetasoL (TEMOVATE) 0.05 % cream Apply topically 2 (two) times daily as needed for rash.Stop using steroid topical when skin is smooth and non itchy.  Do not treat dark or red coloring.    dolutegravir-lamivudine (DOVATO)  mg Tab Take 1 tablet by mouth once daily.    hydrocortisone (ANUSOL-HC) 2.5 % rectal cream Place rectally 2 (two) times daily.    insulin syringe-needle U-100 1 mL 30 gauge X 7/16" Syrg Use as directed    ketoconazole (NIZORAL) 2 % cream Apply topically once daily.    lisinopriL 10 MG tablet Take 1 tablet (10 mg total) by mouth once daily.    OTEZLA STARTER 10 mg (4)-20 mg (4)-30 mg (47) DsPk Titration: Take 1 tablet po bid as directed    SEROSTIM 6 mg SolR INJECT UNDER THE SKIN EVERY DAY   Last reviewed on 7/12/2023 10:30 AM by Nikos Dickerson, Anthony    Review of patient's allergies indicates:  No Known AllergiesLast reviewed on  7/12/2023 10:30 AM by Nikos TITUS" Dickerson          Assessment Questions - Documented Responses      Flowsheet Row Most Recent Value   Assessment    Medication Reconciliation completed for patient Yes   During the past 4 weeks, has patient missed any activities due to condition or medication? No   During the past 4 weeks, did patient have any of the following urgent care visits? None   Goals of Therapy Status Discussed (new start)   Status of the patients ability to self-administer: Is Able   All education points have been covered with patient? Yes, supplemental printed education provided   Welcome packet contents reviewed and discussed with patient? Yes   Assesment completed? Yes   Plan Therapy being initiated   Do you need to open a clinical intervention (i-vent)? No   Do you want to schedule first shipment? Yes   Medication #1 Assessment Info    Patient status New medication, New to [facility]   Is this medication appropriate for the patient? Yes   Is this medication effective? Not yet started          Refill Questions - Documented Responses      Flowsheet Row Most Recent Value   Patient Availability and HIPAA Verification    Does patient want to proceed with activity? Yes   HIPAA/medical authority confirmed? Yes   Relationship to patient of person spoken to? Self   Refill Screening Questions    When does the patient need to receive the medication? 07/13/23   Refill Delivery Questions    How will the patient receive the medication? Mail   When does the patient need to receive the medication? 07/13/23   Shipping Address Home   Address in [facility] confirmed and updated if neccessary? Yes   Expected Copay ($) 0   Is the patient able to afford the medication copay? Yes   Payment Method zero copay   Days supply of Refill 28   Supplies needed? No supplies needed   Refill activity completed? Yes   Refill activity plan Refill scheduled   Shipment/Pickup Date: 07/12/23            Objective    He has a past medical history of AC joint dislocation,  "Arthritis, GERD (gastroesophageal reflux disease) (2008), HIV (human immunodeficiency virus infection), and Hypertension.    Tried/failed medications: Clobetasol    BP Readings from Last 4 Encounters:   06/27/23 137/88   06/14/23 (!) 147/86   04/06/23 134/77   01/13/23 (!) 163/87     Ht Readings from Last 4 Encounters:   06/27/23 6' 4" (1.93 m)   06/14/23 6' 4" (1.93 m)   01/13/23 6' 4" (1.93 m)   07/11/22 6' 4" (1.93 m)     Wt Readings from Last 4 Encounters:   06/27/23 108.9 kg (240 lb)   06/14/23 107.6 kg (237 lb 4.8 oz)   04/06/23 110 kg (242 lb 8.1 oz)   01/13/23 110 kg (242 lb 8.1 oz)       The goals of Psoriasis treatment include:  Reducing the size, thickness and extent of lesions and erythema  Relieving the symptoms of psoriasis  Improving and maintaining physical function  Preventing infection and other complications of treatment  Reducing long term complications of psoriasis  Minimizing psychological impact on overall well-being  Improving or maintaining quality of life  Maintaining optimal therapy adherence  Minimizing and managing side effects    Goals of Therapy Status: Discussed (new start)    Assessment/Plan  Patient plans to start therapy on 07/13/23      Indication, dosage, appropriateness, effectiveness, safety and convenience of his specialty medication(s) were reviewed today.     Patient Education   Patient received education on the following:   Expectations and possible outcomes of therapy  Proper use, timely administration, and missed dose management  Duration of therapy  Side effects, including prevention, minimization, and management  Contraindications and safety precautions  New or changed medications, including prescribe and over the counter medications and supplements  Reviews recommended vaccinations, as appropriate  Storage, safe handling, and disposal    Pt opted out of Promis 10 and follow up assessments.    Tasks added this encounter   No tasks added.   Tasks due within next 3 months "   7/11/2023 - Initial Clinical Assessment/Patient Education (Annual Reassessment)  7/5/2023 - Set up Initial Fill     Nikos Dickerson, PharmD  [facility]  [address]  [facility] [address]  Phone: [phone]  Fax: [phone]

## 2023-07-21 ENCOUNTER — PATIENT MESSAGE (OUTPATIENT)
Dept: INFECTIOUS DISEASES | Facility: CLINIC | Age: 48
End: 2023-07-21
Payer: MEDICARE

## 2023-07-28 ENCOUNTER — SPECIALTY PHARMACY (OUTPATIENT)
Dept: PHARMACY | Facility: CLINIC | Age: 48
End: 2023-07-28
Payer: MEDICARE

## 2023-07-28 NOTE — TELEPHONE ENCOUNTER
Contacted pt regarding Dovato initial consult. He states provider reached out to him about doing monthly Cabenuva injections (versus every other month). He would like to give monthly Cabenuva injections a try before transitioning to Dovato. [facility] advised he message provider back to inform him of his decision and to ensure everything is squared away for 8/2/23 injection appt. Will close Dovato enrollment at this time due to patient's decision regarding treatment plans. Provider will need to send in new Dovato Rx if patient would like to switch therapy in the future.

## 2023-08-01 ENCOUNTER — LAB VISIT (OUTPATIENT)
Dept: LAB | Facility: HOSPITAL | Age: 48
End: 2023-08-01
Attending: DERMATOLOGY
Payer: MEDICARE

## 2023-08-01 ENCOUNTER — OFFICE VISIT (OUTPATIENT)
Dept: DERMATOLOGY | Facility: CLINIC | Age: 48
End: 2023-08-01
Payer: MEDICARE

## 2023-08-01 DIAGNOSIS — L29.9 PRURITUS, UNSPECIFIED: ICD-10-CM

## 2023-08-01 DIAGNOSIS — E53.8 DEFICIENCY OF OTHER SPECIFIED B GROUP VITAMINS: ICD-10-CM

## 2023-08-01 DIAGNOSIS — Z21 HIV POSITIVE: ICD-10-CM

## 2023-08-01 DIAGNOSIS — Z51.81 MEDICATION MONITORING ENCOUNTER: ICD-10-CM

## 2023-08-01 DIAGNOSIS — L20.89 OTHER ATOPIC DERMATITIS: ICD-10-CM

## 2023-08-01 DIAGNOSIS — Z76.89 ENCOUNTER FOR SKIN CARE: ICD-10-CM

## 2023-08-01 DIAGNOSIS — R21 RASH OF BOTH HANDS: ICD-10-CM

## 2023-08-01 DIAGNOSIS — L40.9 PSORIASIS: ICD-10-CM

## 2023-08-01 DIAGNOSIS — L29.8 OTHER PRURITUS: ICD-10-CM

## 2023-08-01 DIAGNOSIS — L40.9 PSORIASIS: Primary | ICD-10-CM

## 2023-08-01 LAB
FERRITIN SERPL-MCNC: 120 NG/ML (ref 20–300)
HBV SURFACE AG SERPL QL IA: NORMAL
HCV AB SERPL QL IA: NORMAL
IRON SERPL-MCNC: 126 UG/DL (ref 45–160)
SATURATED IRON: 24 % (ref 20–50)
TOTAL IRON BINDING CAPACITY: 531 UG/DL (ref 250–450)
TRANSFERRIN SERPL-MCNC: 359 MG/DL (ref 200–375)
TSH SERPL DL<=0.005 MIU/L-ACNC: 1.08 UIU/ML (ref 0.4–4)
VIT B12 SERPL-MCNC: 367 PG/ML (ref 210–950)

## 2023-08-01 PROCEDURE — 86038 ANTINUCLEAR ANTIBODIES: CPT | Performed by: DERMATOLOGY

## 2023-08-01 PROCEDURE — 86780 TREPONEMA PALLIDUM: CPT | Performed by: DERMATOLOGY

## 2023-08-01 PROCEDURE — 84466 ASSAY OF TRANSFERRIN: CPT | Performed by: DERMATOLOGY

## 2023-08-01 PROCEDURE — 99215 OFFICE O/P EST HI 40 MIN: CPT | Mod: S$PBB,,, | Performed by: DERMATOLOGY

## 2023-08-01 PROCEDURE — 99215 PR OFFICE/OUTPT VISIT, EST, LEVL V, 40-54 MIN: ICD-10-PCS | Mod: S$PBB,,, | Performed by: DERMATOLOGY

## 2023-08-01 PROCEDURE — 86039 ANTINUCLEAR ANTIBODIES (ANA): CPT | Performed by: DERMATOLOGY

## 2023-08-01 PROCEDURE — 84443 ASSAY THYROID STIM HORMONE: CPT | Performed by: DERMATOLOGY

## 2023-08-01 PROCEDURE — 82728 ASSAY OF FERRITIN: CPT | Performed by: DERMATOLOGY

## 2023-08-01 PROCEDURE — 86060 ANTISTREPTOLYSIN O TITER: CPT | Performed by: DERMATOLOGY

## 2023-08-01 PROCEDURE — 86003 ALLG SPEC IGE CRUDE XTRC EA: CPT | Performed by: DERMATOLOGY

## 2023-08-01 PROCEDURE — 86592 SYPHILIS TEST NON-TREP QUAL: CPT | Performed by: DERMATOLOGY

## 2023-08-01 PROCEDURE — 86235 NUCLEAR ANTIGEN ANTIBODY: CPT | Mod: 59 | Performed by: DERMATOLOGY

## 2023-08-01 PROCEDURE — 84630 ASSAY OF ZINC: CPT | Performed by: DERMATOLOGY

## 2023-08-01 PROCEDURE — 82607 VITAMIN B-12: CPT | Performed by: DERMATOLOGY

## 2023-08-01 PROCEDURE — 99999 PR PBB SHADOW E&M-EST. PATIENT-LVL III: CPT | Mod: PBBFAC,,, | Performed by: DERMATOLOGY

## 2023-08-01 PROCEDURE — 86803 HEPATITIS C AB TEST: CPT | Performed by: DERMATOLOGY

## 2023-08-01 PROCEDURE — 86593 SYPHILIS TEST NON-TREP QUANT: CPT | Performed by: DERMATOLOGY

## 2023-08-01 PROCEDURE — 99999 PR PBB SHADOW E&M-EST. PATIENT-LVL III: ICD-10-PCS | Mod: PBBFAC,,, | Performed by: DERMATOLOGY

## 2023-08-01 PROCEDURE — 99213 OFFICE O/P EST LOW 20 MIN: CPT | Mod: PBBFAC,PN | Performed by: DERMATOLOGY

## 2023-08-01 PROCEDURE — 87340 HEPATITIS B SURFACE AG IA: CPT | Performed by: DERMATOLOGY

## 2023-08-01 PROCEDURE — 82652 VIT D 1 25-DIHYDROXY: CPT | Performed by: DERMATOLOGY

## 2023-08-01 PROCEDURE — 86003 ALLG SPEC IGE CRUDE XTRC EA: CPT | Mod: 59 | Performed by: DERMATOLOGY

## 2023-08-01 PROCEDURE — 84403 ASSAY OF TOTAL TESTOSTERONE: CPT | Performed by: DERMATOLOGY

## 2023-08-01 PROCEDURE — 86225 DNA ANTIBODY NATIVE: CPT | Performed by: DERMATOLOGY

## 2023-08-01 RX ORDER — APREMILAST 30 MG/1
30 TABLET, FILM COATED ORAL 2 TIMES DAILY
Qty: 60 TABLET | Refills: 0 | Status: SHIPPED | OUTPATIENT
Start: 2023-08-01 | End: 2023-08-09 | Stop reason: SDUPTHER

## 2023-08-01 RX ORDER — CLOBETASOL PROPIONATE 0.5 MG/G
CREAM TOPICAL 2 TIMES DAILY
Qty: 60 G | Refills: 1 | Status: SHIPPED | OUTPATIENT
Start: 2023-08-01

## 2023-08-01 NOTE — PROGRESS NOTES
Subjective:      Patient ID:  Moisés Koehler is a 47 y.o. male who presents for   Chief Complaint   Patient presents with    Psoriasis     Follow-up      Psoriasis - Follow-up  Affected locations: right lower leg, left lower leg, right elbow, left elbow, left hand and right hand      Review of Systems   Constitutional:  Negative for fever and fatigue.   HENT:  Negative for sore throat.    Gastrointestinal:  Positive for indigestion. Negative for diarrhea.   Genitourinary:  Negative for dysuria (no sex dysfxn).   Musculoskeletal:  Negative for arthralgias.   Skin:  Positive for itching and rash.       Objective:   Physical Exam   Constitutional: He appears well-developed and well-nourished.   Neurological: He is alert and oriented to person, place, and time.   Psychiatric: He has a normal mood and affect.   Skin:               Diagram Legend     Erythematous scaling macule/papule c/w actinic keratosis       Vascular papule c/w angioma      Pigmented verrucoid papule/plaque c/w seborrheic keratosis      Yellow umbilicated papule c/w sebaceous hyperplasia      Irregularly shaped tan macule c/w lentigo     1-2 mm smooth white papules consistent with Milia      Movable subcutaneous cyst with punctum c/w epidermal inclusion cyst      Subcutaneous movable cyst c/w pilar cyst      Firm pink to brown papule c/w dermatofibroma      Pedunculated fleshy papule(s) c/w skin tag(s)      Evenly pigmented macule c/w junctional nevus     Mildly variegated pigmented, slightly irregular-bordered macule c/w mildly atypical nevus      Flesh colored to evenly pigmented papule c/w intradermal nevus       Pink pearly papule/plaque c/w basal cell carcinoma      Erythematous hyperkeratotic cursted plaque c/w SCC      Surgical scar with no sign of skin cancer recurrence      Open and closed comedones      Inflammatory papules and pustules      Verrucoid papule consistent consistent with wart     Erythematous eczematous patches and  plaques     Dystrophic onycholytic nail with subungual debris c/w onychomycosis     Umbilicated papule    Erythematous-base heme-crusted tan verrucoid plaque consistent with inflamed seborrheic keratosis     Erythematous Silvery Scaling Plaque c/w Psoriasis     See annotation      Assessment / Plan:        Psoriasis  -     Zinc; Future; Expected date: 08/01/2023  -     Vitamin B12; Future; Expected date: 08/01/2023  -     Streptococcal Antibodes (ASO Titer); Future; Expected date: 08/01/2023  -     Ferritin; Future; Expected date: 08/01/2023  -     Iron and TIBC; Future; Expected date: 08/01/2023  -     TSH; Future; Expected date: 08/01/2023  -     Calcitriol; Future; Expected date: 08/01/2023  -     JERMAINE; Future; Expected date: 08/01/2023  -     Hepatitis C Antibody; Future; Expected date: 08/01/2023  -     Hepatitis B Surface Antigen; Future; Expected date: 08/01/2023  -     RPR; Future; Expected date: 08/01/2023  -     ALLERGEN WHEAT; Future; Expected date: 08/01/2023  -     ALLERGEN EGG YOLK; Future; Expected date: 08/01/2023  -     ALLERGEN EGG WHITE; Future; Expected date: 08/01/2023  -     ALLERGEN SOYBEAN; Future; Expected date: 08/01/2023  -     ALLERGEN MILK; Future; Expected date: 08/01/2023  -     ALLERGEN PEANUT; Future; Expected date: 08/01/2023  -     ALLERGEN SHRIMP; Future; Expected date: 08/01/2023  -     ALLERGEN-CODFISH; Future; Expected date: 08/01/2023  -     TESTOSTERONE, FREE (DIALYSIS) AND TOTAL, LC/MS/MS; Future; Expected date: 08/01/2023  -     clobetasoL (TEMOVATE) 0.05 % cream; Apply topically 2 (two) times daily as needed for rash.Stop using steroid topical when skin is smooth and non itchy.  Do not treat dark or red coloring.  Dispense: 60 g; Refill: 1  -     apremilast (OTEZLA) 30 mg Tab; Take 1 tablet (30 mg total) by mouth 2 (two) times daily.  Dispense: 60 tablet; Refill: 0  Persistent post 1st mth otezla.  Somewhat better.  Cont otezla for now.  Consider uvb.  UVB therapy as  optional treatment reviewed.  [facility] be done at [facility].  Theoretical risks about future skin cancer reviewed.  Pt interested more in soriatane if not better with 1 mth more of otezla.  Discussed benefits and risks of Soriatane including but not limited to elevated triglycerides and hepatotoxicity.    Check baseline CMP and lipid panel.    If labs WNL, start Soriatane at 10-25mg p.o. qd. Will need LFTs and Lipid panel in 1 month then q 3 -6 months.    Encounter for skin care  Good skin care regimen discussed including limiting to one bath or shower per day, using lukewarm water with mild soap and moisturization to skin once to twice daily.  Consider glycerin bar soap or Dove.  Consider organic coconut oil.    HIV positive    Medication monitoring encounter    Deficiency of other specified B group vitamins  -     Vitamin B12; Future; Expected date: 08/01/2023  This is suspected.    Other pruritus  -     Ferritin; Future; Expected date: 08/01/2023  -     Iron and TIBC; Future; Expected date: 08/01/2023  -     clobetasoL (TEMOVATE) 0.05 % cream; Apply topically 2 (two) times daily as needed for rash.Stop using steroid topical when skin is smooth and non itchy.  Do not treat dark or red coloring.  Dispense: 60 g; Refill: 1    Pruritus, unspecified  -     TSH; Future; Expected date: 08/01/2023  -     clobetasoL (TEMOVATE) 0.05 % cream; Apply topically 2 (two) times daily as needed for rash.Stop using steroid topical when skin is smooth and non itchy.  Do not treat dark or red coloring.  Dispense: 60 g; Refill: 1  Reviewed with patient different treatment options and associated risks.    Other atopic dermatitis  -     ALLERGEN WHEAT; Future; Expected date: 08/01/2023  -     ALLERGEN EGG YOLK; Future; Expected date: 08/01/2023  -     ALLERGEN EGG WHITE; Future; Expected date: 08/01/2023  -     ALLERGEN SOYBEAN; Future; Expected date: 08/01/2023  -     ALLERGEN MILK; Future; Expected date: 08/01/2023  -     ALLERGEN  PEANUT; Future; Expected date: 08/01/2023  -     ALLERGEN SHRIMP; Future; Expected date: 08/01/2023  Poss food allergen?    Rash of both hands  -     clobetasoL (TEMOVATE) 0.05 % cream; Apply topically 2 (two) times daily as needed for rash.Stop using steroid topical when skin is smooth and non itchy.  Do not treat dark or red coloring.  Dispense: 60 g; Refill: 1  Reviewed with patient different treatment options and associated risks.             Follow up if symptoms worsen or fail to improve, for Post labs.

## 2023-08-02 ENCOUNTER — CLINICAL SUPPORT (OUTPATIENT)
Dept: INFECTIOUS DISEASES | Facility: CLINIC | Age: 48
End: 2023-08-02
Payer: MEDICARE

## 2023-08-02 ENCOUNTER — PATIENT MESSAGE (OUTPATIENT)
Dept: DERMATOLOGY | Facility: CLINIC | Age: 48
End: 2023-08-02
Payer: MEDICARE

## 2023-08-02 DIAGNOSIS — L40.9 PSORIASIS: ICD-10-CM

## 2023-08-02 DIAGNOSIS — B20 HUMAN IMMUNODEFICIENCY VIRUS (HIV) DISEASE: Primary | ICD-10-CM

## 2023-08-02 LAB
ANA PATTERN 1: NORMAL
ANA SER QL IF: POSITIVE
ANA TITR SER IF: NORMAL {TITER}
RPR SER QL: REACTIVE
RPR SER-TITR: ABNORMAL {TITER}

## 2023-08-02 PROCEDURE — 96372 THER/PROPH/DIAG INJ SC/IM: CPT | Mod: PBBFAC

## 2023-08-02 PROCEDURE — 99999PBSHW PR PBB SHADOW TECHNICAL ONLY FILED TO HB: Mod: JZ,PBBFAC,,

## 2023-08-02 PROCEDURE — 99999PBSHW PR PBB SHADOW TECHNICAL ONLY FILED TO HB: ICD-10-PCS | Mod: JZ,PBBFAC,,

## 2023-08-02 RX ORDER — APREMILAST 10-20-30MG
KIT ORAL
Qty: 56 TABLET | Refills: 0 | Status: CANCELLED | OUTPATIENT
Start: 2023-08-02

## 2023-08-02 RX ADMIN — CABOTEGRAVIR AND RILPIVIRINE 6 ML: KIT at 12:08

## 2023-08-02 NOTE — PROGRESS NOTES
Patient received the bimonthly dose of cabenuva 600/900 in the bilateral buttocks. Pt tolerated well. Pt asked to wait in the clinic 15 minutes after injection in the event of an allergic reaction. Pt verbalized understanding. Pt left in NAD.    BP  147/83   / HR 76  / Temp  97.8 / Wt 104.9 Kg    Pregnant or planning to be pregnant in the next 28 days?    Any of these symptoms since last injection:    Jaundice No    Dark urine No    Light colored stool No    nausea or vomiting No    loss of appetite No    pain in right side of abdomen No    Itching No     feelings of sadness or hopelessness No    anxious or restless No more than usual    any thoughts of hurting yourself No    any new medications added since last inj herbal/OTC/prescription? Otezla and Duloxetine.

## 2023-08-03 DIAGNOSIS — A53.0 POSITIVE RPR TEST: Primary | ICD-10-CM

## 2023-08-03 RX ORDER — DOXYCYCLINE HYCLATE 100 MG
100 TABLET ORAL 2 TIMES DAILY
Qty: 28 TABLET | Refills: 0 | Status: SHIPPED | OUTPATIENT
Start: 2023-08-03 | End: 2023-08-17

## 2023-08-04 LAB
1,25(OH)2D3 SERPL-MCNC: 46 PG/ML (ref 20–79)
ANTI SM ANTIBODY: 0.08 RATIO (ref 0–0.99)
ANTI SM/RNP ANTIBODY: 0.08 RATIO (ref 0–0.99)
ANTI-SM INTERPRETATION: NEGATIVE
ANTI-SM/RNP INTERPRETATION: NEGATIVE
ANTI-SSA ANTIBODY: 0.07 RATIO (ref 0–0.99)
ANTI-SSA INTERPRETATION: NEGATIVE
ANTI-SSB ANTIBODY: 0.1 RATIO (ref 0–0.99)
ANTI-SSB INTERPRETATION: NEGATIVE
ASO AB SERPL-ACNC: 167 IU/ML
DSDNA AB SER-ACNC: NORMAL [IU]/ML
T PALLIDUM AB SER QL IF: REACTIVE
ZINC SERPL-MCNC: 92 UG/DL (ref 60–130)

## 2023-08-05 LAB
CODFISH IGE QN: <0.1 KU/L
COW MILK IGE QN: <0.1 KU/L
DEPRECATED CODFISH IGE RAST QL: NORMAL
DEPRECATED COW MILK IGE RAST QL: NORMAL
DEPRECATED EGG WHITE IGE RAST QL: NORMAL
DEPRECATED EGG YOLK IGE RAST QL: NORMAL
DEPRECATED PEANUT IGE RAST QL: NORMAL
DEPRECATED SHRIMP IGE RAST QL: NORMAL
DEPRECATED SOYBEAN IGE RAST QL: NORMAL
DEPRECATED WHEAT IGE RAST QL: NORMAL
EGG WHITE IGE QN: <0.1 KU/L
EGG YOLK IGE QN: <0.1 KU/L
PEANUT IGE QN: <0.1 KU/L
SHRIMP IGE QN: <0.1 KU/L
SOYBEAN IGE QN: <0.1 KU/L
WHEAT IGE QN: <0.1 KU/L

## 2023-08-07 ENCOUNTER — SPECIALTY PHARMACY (OUTPATIENT)
Dept: PHARMACY | Facility: CLINIC | Age: 48
End: 2023-08-07
Payer: MEDICARE

## 2023-08-07 DIAGNOSIS — L40.9 PSORIASIS: ICD-10-CM

## 2023-08-07 RX ORDER — APREMILAST 30 MG/1
30 TABLET, FILM COATED ORAL 2 TIMES DAILY
Qty: 60 TABLET | Refills: 0 | Status: CANCELLED | OUTPATIENT
Start: 2023-08-07 | End: 2023-09-06

## 2023-08-07 NOTE — TELEPHONE ENCOUNTER
Informed the patient that a refill request has been sent to his provider. The patient reported having 2.5 days on hand. [facility] will follow up.

## 2023-08-08 ENCOUNTER — PATIENT MESSAGE (OUTPATIENT)
Dept: DERMATOLOGY | Facility: CLINIC | Age: 48
End: 2023-08-08
Payer: MEDICARE

## 2023-08-08 DIAGNOSIS — L40.9 PSORIASIS: ICD-10-CM

## 2023-08-08 NOTE — TELEPHONE ENCOUNTER
Outgoing call to advise pt to reach out to [facility] and let them know we're waiting on a response, as they have not responded to refill request; pt said he will reach out

## 2023-08-09 LAB
TESTOST FREE SERPL-MCNC: 65.9 PG/ML (ref 35–155)
TESTOST SERPL-MCNC: 302 NG/DL (ref 250–1100)

## 2023-08-09 RX ORDER — APREMILAST 30 MG/1
30 TABLET, FILM COATED ORAL 2 TIMES DAILY
Qty: 60 TABLET | Refills: 0 | Status: SHIPPED | OUTPATIENT
Start: 2023-08-09 | End: 2023-09-08

## 2023-08-14 NOTE — TELEPHONE ENCOUNTER
Provider sent the Otezla maintenance dose to [facility] instead of [facility]. Test claim indicates that the prescription was filled by [facility] on 8/9/23. Unable to reach the patient to see if he wants to continue filling at [facility].

## 2023-09-06 ENCOUNTER — PATIENT MESSAGE (OUTPATIENT)
Dept: OPHTHALMOLOGY | Facility: CLINIC | Age: 48
End: 2023-09-06
Payer: MEDICARE

## 2023-09-08 ENCOUNTER — OFFICE VISIT (OUTPATIENT)
Dept: OPHTHALMOLOGY | Facility: CLINIC | Age: 48
End: 2023-09-08
Payer: MEDICARE

## 2023-09-08 DIAGNOSIS — H43.813 VITREOUS DEGENERATION OF BOTH EYES: ICD-10-CM

## 2023-09-08 DIAGNOSIS — Z21 HIV POSITIVE: ICD-10-CM

## 2023-09-08 DIAGNOSIS — H43.89 VITRITIS: ICD-10-CM

## 2023-09-08 DIAGNOSIS — A52.71 OCULAR SYPHILIS: Primary | ICD-10-CM

## 2023-09-08 DIAGNOSIS — H47.10 OPTIC DISC EDEMA: ICD-10-CM

## 2023-09-08 PROCEDURE — 92014 COMPRE OPH EXAM EST PT 1/>: CPT | Mod: S$PBB,,, | Performed by: OPHTHALMOLOGY

## 2023-09-08 PROCEDURE — 99999 PR PBB SHADOW E&M-EST. PATIENT-LVL III: CPT | Mod: PBBFAC,,, | Performed by: OPHTHALMOLOGY

## 2023-09-08 PROCEDURE — 99213 OFFICE O/P EST LOW 20 MIN: CPT | Mod: PBBFAC | Performed by: OPHTHALMOLOGY

## 2023-09-08 PROCEDURE — 92134 CPTRZ OPH DX IMG PST SGM RTA: CPT | Mod: PBBFAC | Performed by: OPHTHALMOLOGY

## 2023-09-08 PROCEDURE — 92014 PR EYE EXAM, EST PATIENT,COMPREHESV: ICD-10-PCS | Mod: S$PBB,,, | Performed by: OPHTHALMOLOGY

## 2023-09-08 PROCEDURE — 92134 OCT, RETINA - OU - BOTH EYES: ICD-10-PCS | Mod: 26,S$PBB,, | Performed by: OPHTHALMOLOGY

## 2023-09-08 PROCEDURE — 99999 PR PBB SHADOW E&M-EST. PATIENT-LVL III: ICD-10-PCS | Mod: PBBFAC,,, | Performed by: OPHTHALMOLOGY

## 2023-09-08 NOTE — PROGRESS NOTES
HPI     DFE  YEARLY EXAM OU      Additional comments: DFE  OU   OCULAR SYPHILIS   OCT  TODAY OU            Comments    DLS   09/22          Last edited by Joseline Arguelles on 9/8/2023  2:11 PM.     HPI     DFE  YEARLY EXAM OU      Additional comments: DFE  OU   OCULAR SYPHILIS   OCT  TODAY OU            Comments    DLS   09/22          Last edited by Joseline Arguelles on 9/8/2023  2:11 PM.         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 HIV positive  Z21 V08   3. Vitritis  H43.89 379.29   4. 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 Vitreous degeneration of both eyes  H43.813 379.21       1. Ocular syphilis  2. HIV positive  3. Vitritis  4. Optic disc edema  Pt with several months of floaters, worsened since July  HIV +, undetectable viral load, CD4 5/2022- 870    Pt girlfriend tested positive for syphilis  Pt has had mouth sores also    RPR 1:128 8/17/22, quant gold neg  Has been on penicillin infusion per ID coordination since our visit 8/18/22, now complete    Overdue for f/u today  Today 9/8/2023  VA better OU, resolved vitritis and vasculitis and heme, has noticed new floaters occ with difficulty with reading, no cell on exam likely just vit syneresis and presbyopia    Plan: observe for now, needs readers, continue f/u with ID    5. Vitreous degeneration of both eyes  No RT/RD  Plan: Observation          RTC 1 year DFE/OCTm OU        I saw and examined the patient and reviewed in detail the findings documented. The final examination findings, image interpretations, and plan as documented in the record represent my personal judgment and conclusions.    Alexey Cabezas MD  Vitreoretinal Surgery   [facility]

## 2023-09-29 DIAGNOSIS — B20 HIV WASTING SYNDROME: ICD-10-CM

## 2023-09-29 DIAGNOSIS — E88.A HIV WASTING SYNDROME: ICD-10-CM

## 2023-09-29 DIAGNOSIS — Z21 HIV POSITIVE: ICD-10-CM

## 2023-09-29 RX ORDER — SYRINGE AND NEEDLE,INSULIN,1ML 30 GX5/16"
SYRINGE, EMPTY DISPOSABLE MISCELLANEOUS
Qty: 480 EACH | Refills: 5 | Status: SHIPPED | OUTPATIENT
Start: 2023-09-29

## 2023-09-29 RX ORDER — SOMATROPIN 6 MG
KIT SUBCUTANEOUS
Qty: 28 EACH | Refills: 11 | Status: SHIPPED | OUTPATIENT
Start: 2023-09-29

## 2023-10-02 ENCOUNTER — CLINICAL SUPPORT (OUTPATIENT)
Dept: INFECTIOUS DISEASES | Facility: CLINIC | Age: 48
End: 2023-10-02
Payer: MEDICARE

## 2023-10-02 DIAGNOSIS — Z21 HIV POSITIVE: Primary | ICD-10-CM

## 2023-10-02 PROBLEM — K62.5 RECTAL BLEEDING: Status: RESOLVED | Noted: 2023-06-27 | Resolved: 2023-10-02

## 2023-10-02 PROCEDURE — 99999PBSHW PR PBB SHADOW TECHNICAL ONLY FILED TO HB: Mod: JZ,PBBFAC,,

## 2023-10-02 PROCEDURE — 99999PBSHW PR PBB SHADOW TECHNICAL ONLY FILED TO HB: ICD-10-PCS | Mod: JZ,PBBFAC,,

## 2023-10-02 PROCEDURE — 96372 THER/PROPH/DIAG INJ SC/IM: CPT | Mod: PBBFAC

## 2023-10-02 RX ADMIN — CABOTEGRAVIR AND RILPIVIRINE 6 ML: KIT at 11:10

## 2023-10-02 NOTE — PROGRESS NOTES
Patient received the bimonthly dose of Cabenuva 600/900 in the bilateral buttocks. Pt tolerated well. Pt asked to wait in the clinic 15 minutes after injection in the event of an allergic reaction. Pt verbalized understanding. Pt left in NAD.      Pregnant or planning to be pregnant in the next 28 days?    Any of these symptoms since last injection:    Jaundice No    Dark urine No    Light colored stool    nausea or vomiting No    loss of appetite No    pain in right side of abdomen No    Itching No    feelings of sadness or hopelessness No    anxious or restless No    any thoughts of hurting yourself No    any new medications added since last inj herbal/OTC/prescription? No    BP   138/84 / HR  81 / Temp  98.6

## 2023-10-04 ENCOUNTER — PATIENT MESSAGE (OUTPATIENT)
Dept: DERMATOLOGY | Facility: CLINIC | Age: 48
End: 2023-10-04
Payer: MEDICARE

## 2023-10-05 DIAGNOSIS — L40.9 PSORIASIS: Primary | ICD-10-CM

## 2024-03-25 ENCOUNTER — PATIENT MESSAGE (OUTPATIENT)
Dept: ORTHOPEDICS | Facility: CLINIC | Age: 49
End: 2024-03-25
Payer: MEDICARE

## 2024-04-09 ENCOUNTER — PATIENT MESSAGE (OUTPATIENT)
Dept: ORTHOPEDICS | Facility: CLINIC | Age: 49
End: 2024-04-09
Payer: MEDICARE

## 2024-04-09 DIAGNOSIS — M79.641 RIGHT HAND PAIN: Primary | ICD-10-CM

## 2024-04-15 ENCOUNTER — OFFICE VISIT (OUTPATIENT)
Dept: ORTHOPEDICS | Facility: CLINIC | Age: 49
End: 2024-04-15
Payer: MEDICARE

## 2024-04-15 ENCOUNTER — HOSPITAL ENCOUNTER (OUTPATIENT)
Dept: RADIOLOGY | Facility: OTHER | Age: 49
Discharge: HOME | End: 2024-04-15
Attending: ORTHOPAEDIC SURGERY
Payer: MEDICARE

## 2024-04-15 DIAGNOSIS — M25.521 RIGHT ELBOW PAIN: Primary | ICD-10-CM

## 2024-04-15 DIAGNOSIS — M25.521 RIGHT ELBOW PAIN: ICD-10-CM

## 2024-04-15 DIAGNOSIS — G56.03 BILATERAL CARPAL TUNNEL SYNDROME: ICD-10-CM

## 2024-04-15 DIAGNOSIS — M79.641 RIGHT HAND PAIN: ICD-10-CM

## 2024-04-15 DIAGNOSIS — M77.8 TENDINITIS OF RIGHT TRICEPS: ICD-10-CM

## 2024-04-15 PROCEDURE — 73080 X-RAY EXAM OF ELBOW: CPT | Mod: 26,RT,, | Performed by: RADIOLOGY

## 2024-04-15 PROCEDURE — 73080 X-RAY EXAM OF ELBOW: CPT | Mod: TC,FY,RT

## 2024-04-15 PROCEDURE — 99214 OFFICE O/P EST MOD 30 MIN: CPT | Mod: S$PBB,,, | Performed by: ORTHOPAEDIC SURGERY

## 2024-04-15 PROCEDURE — 99213 OFFICE O/P EST LOW 20 MIN: CPT | Mod: PBBFAC,25 | Performed by: ORTHOPAEDIC SURGERY

## 2024-04-15 PROCEDURE — 99999 PR PBB SHADOW E&M-EST. PATIENT-LVL III: CPT | Mod: PBBFAC,,, | Performed by: ORTHOPAEDIC SURGERY

## 2024-04-15 NOTE — PROGRESS NOTES
[facility]  History & Physical  Orthopedics    SUBJECTIVE:      COVID-19 attestation:  This patient was treated during the COVID-19 pandemic.  This was discussed with the patient, they are aware of our current policies and procedures, were given the option of delaying their visit and or switching to a virtual visit, delaying their surgery when applicable, and they elect to proceed.    Chief Complaint: right sided elbow pain that radiates over the dorsum of the forearm, bilateral numbness/tingling that radiates to the 4th and 5th digit    Referring Provider: No ref. provider found     History of Present Illness:  Patient is a 48 y.o. right hand dominant male who presents today with complaints of a 1 year history of right sided forearm pain. He states that its a burning sharp pain that tracks from the elbow up the tricep posteriorly and down the forearm.      The patient is a/an .    Onset of symptoms/DOI was 1 year.    Symptoms are aggravated by activity and movement.    Symptoms are alleviated by rest and immobilization.    Symptoms consist of pain and numbness/tingling.    The patient rates their pain as a 7/10.    Attempted treatment(s) and/or interventions include activity modifications, rest, rest, activity modification, and anti-inflammatory medications.     The patient denies any fevers, chills, N/V, D/C and presents for evaluation.       Past Medical History:   Diagnosis Date    AC joint dislocation     Arthritis     GERD (gastroesophageal reflux disease) 2008    HIV (human immunodeficiency virus infection)     Hypertension      Past Surgical History:   Procedure Laterality Date    ARTHROSCOPIC DEBRIDEMENT OF SHOULDER Left 1/14/2019    Procedure: DEBRIDEMENT SLAP, SHOULDER, ARTHROSCOPIC;  Surgeon: Rakesh العيل MD;  Location: [facility];  Service: Orthopedics;  Laterality: Left;  regional w/o catheter     ARTHROSCOPY OF SHOULDER WITH DECOMPRESSION OF SUBACROMIAL  SPACE Left 1/14/2019    Procedure: ARTHROSCOPY, SHOULDER, WITH SUBACROMIAL DECOMPRESSION;  Surgeon: Rakesh العلي MD;  Location: [facility];  Service: Orthopedics;  Laterality: Left;    COLONOSCOPY N/A 12/13/2018    Procedure: COLONOSCOPY;  Surgeon: Shaniqua Chawla MD;  Location: [facility] ENDO (4TH FLR);  Service: Endoscopy;  Laterality: N/A;  schedule as 45 minute case ASAP     COLONOSCOPY N/A 6/27/2023    Procedure: COLONOSCOPY;  Surgeon: Shaniqua Chawla MD;  Location: [facility] ENDO (4TH FLR);  Service: Endoscopy;  Laterality: N/A;  With rectal swab   miralax prep, inst handed to pt and portal-LW    COSMETIC SURGERY      ESOPHAGOGASTRODUODENOSCOPY N/A 6/27/2023    Procedure: EGD (ESOPHAGOGASTRODUODENOSCOPY);  Surgeon: Shaniqua Chawla MD;  Location: [facility] (4TH FLR);  Service: Endoscopy;  Laterality: N/A;  6/21 pre-call confirmed; MB    FIXATION OF TENDON Left 1/14/2019    Procedure: OPEN BICEPS SUBPECTORALIS TENODESIS;  Surgeon: Rakesh العلي MD;  Location: [facility];  Service: Orthopedics;  Laterality: Left;    FRACTURE SURGERY      HERNIA REPAIR      KNEE SURGERY      LYMPH NODE BIOPSY Left 3/27/2019    Procedure: BIOPSY, LYMPH NODE Left Inguinal;  Surgeon: Patrick Lauren MD;  Location: [facility] 2ND FLR;  Service: General;  Laterality: Left;    RECONSTRUCTION OF FINGER Left 1/27/2021    Procedure: RECONSTRUCTION, FINGER - left small finger swan neck reconstruction;  Surgeon: Nikos Bullock MD;  Location: [facility];  Service: Orthopedics;  Laterality: Left;     Review of patient's allergies indicates:  No Known Allergies  Social History     Social History Narrative    Not on file     Family History   Problem Relation Name Age of Onset    Stomach cancer Mother Galina     Lung cancer Maternal Grandmother      Melanoma Neg Hx      Celiac disease Neg Hx      Cirrhosis Neg Hx      Colon cancer Neg Hx      Colon polyps Neg Hx      Crohn's disease Neg Hx      Cystic fibrosis Neg Hx      Esophageal cancer Neg Hx  "     Hemochromatosis Neg Hx      Inflammatory bowel disease Neg Hx      Irritable bowel syndrome Neg Hx      Liver cancer Neg Hx      Liver disease Neg Hx      Rectal cancer Neg Hx      Ulcerative colitis Neg Hx      Wilner's disease Neg Hx      Lymphoma Neg Hx      Tuberculosis Neg Hx      Scleroderma Neg Hx      Rheum arthritis Neg Hx      Multiple sclerosis Neg Hx      Lupus Neg Hx      Psoriasis Neg Hx      Skin cancer Neg Hx           Current Outpatient Medications:     albuterol (PROVENTIL/VENTOLIN HFA) 90 mcg/actuation inhaler, INHALE 2 PUFFS INTO THE LUNGS EVERY 6 HOURS AS NEEDED FOR WHEEZING, Disp: 25.5 g, Rfl: 0    insulin syringe-needle U-100 (EASY TOUCH INSULIN SYRINGE) 1 mL 30 gauge x 5/16 Syrg, USE AS DIRECTED DAILY WITH SEROSTIM, Disp: 480 each, Rfl: 5    SEROSTIM 6 mg SolR, INJECT 6MG UNDER THE SKIN EVRY DAY, Disp: 28 each, Rfl: 11    clobetasoL (TEMOVATE) 0.05 % cream, Apply topically 2 (two) times daily as needed for rash.Stop using steroid topical when skin is smooth and non itchy.  Do not treat dark or red coloring., Disp: 60 g, Rfl: 1    hydrocortisone (ANUSOL-HC) 2.5 % rectal cream, Place rectally 2 (two) times daily., Disp: 28 g, Rfl: 2    insulin syringe-needle U-100 1 mL 30 gauge X 7/16" Syrg, Use as directed, Disp: 100 each, Rfl: 5    ketoconazole (NIZORAL) 2 % cream, Apply topically once daily., Disp: 15 g, Rfl: 1    lisinopriL 10 MG tablet, Take 1 tablet (10 mg total) by mouth once daily., Disp: 30 tablet, Rfl: 11    Current Facility-Administered Medications:     acetaminophen tablet 650 mg, 650 mg, Oral, 1 time in Clinic/HOD, Smooth Millan MD    cabotegravir-rilpivirine 600 mg/3 mL- 900 mg/3 mL injection 6 mL, 6 mL, Intramuscular, Q8 weeks, Omar Hubbard MD, 6 mL at 10/02/23 1137    diphenhydrAMINE capsule 25 mg, 25 mg, Oral, 1 time in Clinic/HOD, Smooth Millan MD    EPINEPHrine (EPIPEN) 0.3 mg/0.3 mL pen injection 0.3 mg, 0.3 mg, Intramuscular, 1 time in Clinic/HOD, " Smooth Millan MD    ondansetron disintegrating tablet 4 mg, 4 mg, Oral, 1 time in Clinic/HOD, Smooth Millan MD    predniSONE tablet 5 mg, 5 mg, Oral, 1 time in Clinic/HOD, Smooth Millan MD      Review of Systems:  As per HPI otherwise noncontributory    OBJECTIVE:      Vital Signs (Most Recent):  There were no vitals filed for this visit.  There is no height or weight on file to calculate BMI.      Physical Exam:  Constitutional: The patient appears well-developed and well-nourished. No distress.   Skin: No lesions appreciated  Head: Normocephalic and atraumatic.   Nose: Nose normal.   Ears: No deformities seen  Eyes: Conjunctivae and EOM are normal.   Neck: No tracheal deviation present.   Cardiovascular: Normal rate and intact distal pulses.    Pulmonary/Chest: Effort normal. No respiratory distress.   Abdominal: There is no guarding.   Neurological: The patient is alert.   Psychiatric: The patient has a normal mood and affect.     Bilateral Hand/Wrist Examination:    Observation/Inspection:  Swelling  none    Deformity  none  Discoloration  none     Scars   none    Atrophy  none    HAND/WRIST EXAMINATION:  Finkelstein's Test   Neg  WHAT Test    Neg  Snuff box tenderness   Neg  Bess's Test    Neg  Hook of Hamate Tenderness  Neg  CMC grind    Neg  Circumduction test   Neg  Pain to palpation 3 cm distal to the lateral epicondyle     Neurovascular Exam:  Digits WWP, brisk CR < 3s throughout  NVI motor/LTS to M/R/U nerves, radial pulse 2+  Tinel's Test - Carpal Tunnel  Positive on the right, negative on the left  Tinel's Test - Cubital Tunnel  Positive bilaterally  Phalen's Test    Neg  Median Nerve Compression Test Neg    ROM hand full, painless    ROM wrist full, painless    ROM elbow full, pain with elbow extension against resistance about the triceps     Abdomen not guarded  Respirations nonlabored  Perfusion intact    Diagnostic Results:     Imaging - I independently viewed the patient's  imaging as well as the radiology report.  Xrays of the patient's right elbow  demonstrate no evidence of any acute fractures or dislocations or significant degenerative changes.    ASSESSMENT/PLAN:      48 y.o. yo male with bilateral cubital tunnel syndrome symptoms and right sided symptoms of carpal tunnel. Likely triceps tendonitis with lateral epicondylitis or radial tunnel syndrome.     Plan: The patient and I had a thorough discussion today.  We discussed the working diagnosis as well as several other potential alternative diagnoses.  Treatment options were discussed, both conservative and surgical.  Conservative treatment options would include things such as activity modifications, workplace modifications, a period of rest, oral vs topical OTC and prescription anti-inflammatory medications, occupational therapy, splinting/bracing, immobilization, corticosteroid injections, and others.  Surgical options were discussed as well.     At this time, the patient would like to proceed with a trial of right sided MRI of the humerus and elbow, bilateral EMG to investigate carpal and cubital tunnel, and right sided CT brace.    Should the patient's symptoms worsen, persist, or fail to improve they should return for reevaluation and I would be happy to see them back anytime.        Nikos Bullock M.D.    Please be aware that this note has been generated with the assistance of [employer] voice-to-text.  Please excuse any spelling or grammatical errors.    Thank you for choosing Dr. Nikos Bullock for your orthopedic hand and upper extremity care. It is our goal to provide you with exceptional care that will help keep you healthy, active, and get you back in the game.     If you felt that you received exemplary care today, please consider leaving feedback for Dr. Bullock on [email or web address] at [email or web address].    Please do not hesitate to reach out to us via email, phone, or MyChart with any  questions, concerns, or feedback.

## 2024-04-25 ENCOUNTER — HOSPITAL ENCOUNTER (OUTPATIENT)
Dept: RADIOLOGY | Facility: HOSPITAL | Age: 49
Discharge: HOME | End: 2024-04-25
Attending: ORTHOPAEDIC SURGERY
Payer: MEDICARE

## 2024-04-25 DIAGNOSIS — M25.521 RIGHT ELBOW PAIN: ICD-10-CM

## 2024-04-25 DIAGNOSIS — M77.8 TENDINITIS OF RIGHT TRICEPS: ICD-10-CM

## 2024-04-25 PROCEDURE — 73221 MRI JOINT UPR EXTREM W/O DYE: CPT | Mod: TC,RT

## 2024-04-25 PROCEDURE — 73221 MRI JOINT UPR EXTREM W/O DYE: CPT | Mod: 26,RT,, | Performed by: RADIOLOGY

## 2024-06-05 ENCOUNTER — PROCEDURE VISIT (OUTPATIENT)
Dept: NEUROLOGY | Facility: CLINIC | Age: 49
End: 2024-06-05
Payer: MEDICARE

## 2024-06-05 DIAGNOSIS — G56.03 BILATERAL CARPAL TUNNEL SYNDROME: ICD-10-CM

## 2024-06-05 PROCEDURE — 95912 NRV CNDJ TEST 11-12 STUDIES: CPT | Mod: 26,S$PBB,, | Performed by: PHYSICAL MEDICINE & REHABILITATION

## 2024-06-05 PROCEDURE — 95886 MUSC TEST DONE W/N TEST COMP: CPT | Mod: 26,S$PBB,, | Performed by: PHYSICAL MEDICINE & REHABILITATION

## 2024-06-05 PROCEDURE — 95911 NRV CNDJ TEST 9-10 STUDIES: CPT | Mod: PBBFAC,PN | Performed by: PHYSICAL MEDICINE & REHABILITATION

## 2024-06-05 PROCEDURE — 95886 MUSC TEST DONE W/N TEST COMP: CPT | Mod: PBBFAC,PN | Performed by: PHYSICAL MEDICINE & REHABILITATION

## 2024-06-05 NOTE — PROCEDURES
Test Date:  2024    Patient: moisés herbert : 1975 Physician: Patrick Glass D.O.   ID#: [id number] SEX: Male Ref. Phys: Nikos Bullock MD     HPI: Moisés Herbert is a 48 y.o.male who presents for NCS/EMG to evaluate for cubital tunnel and carpal tunnel bilaterally.      PROCEDURE:  Prior to the procedure, the procedure was discussed in detail with the patient.  All questions were answered, and verbal consent was obtained.  For nerve conduction studies, a combination of surface electrodes, bar electrodes, and/or ring electrodes were used as needed.  For needle EMG, each site was cleaned and prepped in usual fashion with an alcohol pad.  A monopolar needle (28G) was used.  There was no significant bleeding, and bandages were applied as needed.  The procedure was tolerated without adverse effect.  The patient was instructed on post-procedure care including ice if needed for 10-15 minutes up to 4 times/day for any sore muscles.  I discussed with the patient that the data would be reviewed and a report sent to the referring provider, where any follow up questions regarding next steps should be directed.        NCV & EMG Findings:  All nerve conduction studies (as indicated in the following tables) were within normal limits.  All examined muscles (as indicated in the following table) showed no evidence of electrical instability.      IMPRESSIONS:  This is a normal electrophysiologic study of the bilateral upper extremities.  While there is no electrodiagnostic evidence of a right ulnar neuropathy, his symptoms are still most consistent with ulnar neuropathy and recent MRI showed thickening of the ulnar nerve in the cubital tunnel.  Therefore, despite the normal study today, I still favor cubital tunnel syndrome on the right.          ___________________________  Patrick Glass D.O.        NCS+  Motor Nerve Results      Latency Amplitude F-Lat Segment Distance CV Comment   Site (ms) Norm (mV)  Norm (ms)  (cm) (m/s) Norm    Left Median (APB)   Wrist 4.1  < 4.6 10.0  > 4.2         Elbow 8.9 - 7.0 -  Elbow-Wrist 26 54  > 47    Right Median (APB)   Wrist 4.4  < 4.6 9.1  > 4.2         Elbow 9.1 - 10.1 -  Elbow-Wrist 27 57  > 47    Left Ulnar (ADM)   Wrist 3.1  < 3.7 9.1  > 3.0         Bel Elbow 7.4 - 11.3 -  Bel Elbow-Wrist 25 58  > 52    Abv Elbow 9.3 - 11.2 -  Abv Elbow-Bel Elbow 15 79  > 43    Left Ulnar (FDI)   Wrist 4.2 - 11.4 -         Bel Elbow 8.5 - 10.4 -  Bel Elbow-Wrist 25 58  > 52    Abv Elbow 10.3 - 10.0 -  Abv Elbow-Bel Elbow 15 83  > 43    Right Ulnar (ADM)   Wrist 3.6  < 3.7 8.5  > 3.0         Bel Elbow 8.6 - 9.6 -  Bel Elbow-Wrist 29 58  > 52    Abv Elbow 10.3 - 9.4 -  Abv Elbow-Bel Elbow 11 65  > 43    Right Ulnar (FDI)   Wrist 4.1 - 4.8 -         Bel Elbow 8.7 - 7.6 -  Bel Elbow-Wrist 29 63  > 52    Abv Elbow 10.7 - 7.5 -  Abv Elbow-Bel Elbow 11 55  > 43      Sensory Nerve Results      Start Lat Latency (Peak) Amplitude (P-P) Segment Distance Start CV Comment   Site (ms) Norm (ms) (µV) Norm  (cm) (m/s) Norm    Left Median (Rec:Dig II)   Wrist 3.1  < 3.3 3.8 31  > 13 Wrist-Dig II 14 45  > 42    Right Median (Rec:Dig II)   Wrist 3.0  < 3.3 3.7 24  > 13 Wrist-Dig II 14 47  > 42    Left Ulnar (Rec:Dig V)   Wrist 2.3  < 3.1 3.2 12  > 8 Wrist-Dig V 14 61  > 45    Right Ulnar (Rec:Dig V)   Wrist 2.5  < 3.1 3.0 25  > 8 Wrist-Dig V 14 56  > 45    Right Radial (Rec:Wrist)   Forearm 1.28  < 2.2 1.80 23  > 11 Forearm-Wrist 10 78 -      EMG+     Side Muscle Nerve Root Ins Act Fibs Psw Amp Dur Poly Recrt Int Pat Comment   Right Deltoid Axillary C5-C6 Nml Nml Nml Nml Nml 0 Nml Nml    Right Biceps Musculocut C5-C6 Nml Nml Nml Nml Nml 0 Nml Nml    Right Triceps Radial C6-C8 Nml Nml Nml Nml Nml 0 Nml Nml    Right Pronator Teres Median C6-C7 Nml Nml Nml Nml Nml 0 Nml Nml    Right FDI Ulnar C8-T1 Nml Nml Nml Nml Nml 0 Nml Nml    Right ADM Ulnar C8-T1 Nml Nml Nml Nml Nml 0 Nml Nml             Waveforms:    Motor              Sensory

## 2024-06-11 ENCOUNTER — OFFICE VISIT (OUTPATIENT)
Dept: ORTHOPEDICS | Facility: CLINIC | Age: 49
End: 2024-06-11
Payer: MEDICARE

## 2024-06-11 VITALS — WEIGHT: 240.06 LBS | HEIGHT: 76 IN | BODY MASS INDEX: 29.23 KG/M2

## 2024-06-11 DIAGNOSIS — G56.21 CUBITAL TUNNEL SYNDROME ON RIGHT: Primary | ICD-10-CM

## 2024-06-11 PROCEDURE — 99999 PR PBB SHADOW E&M-EST. PATIENT-LVL III: CPT | Mod: PBBFAC,,, | Performed by: ORTHOPAEDIC SURGERY

## 2024-06-11 PROCEDURE — 99213 OFFICE O/P EST LOW 20 MIN: CPT | Mod: PBBFAC | Performed by: ORTHOPAEDIC SURGERY

## 2024-06-11 PROCEDURE — 99213 OFFICE O/P EST LOW 20 MIN: CPT | Mod: S$PBB,,, | Performed by: ORTHOPAEDIC SURGERY

## 2024-06-11 NOTE — PROGRESS NOTES
[facility]  History & Physical  Orthopedics    SUBJECTIVE:      COVID-19 attestation:  This patient was treated during the COVID-19 pandemic.  This was discussed with the patient, they are aware of our current policies and procedures, were given the option of delaying their visit and or switching to a virtual visit, delaying their surgery when applicable, and they elect to proceed.    Chief Complaint: right sided elbow pain that radiates over the dorsum of the forearm, bilateral numbness/tingling that radiates to the 4th and 5th digit    Referring Provider: No ref. provider found     History of Present Illness:  Patient is a 48 y.o. right hand dominant male who presents today with complaints of a 1 year history of right sided forearm pain. He states that its a burning sharp pain that tracks from the elbow up the tricep posteriorly and down the forearm.      Interval History: 6/11/24  Patient returns today with milder right sided forearm pain and continued numbness and tingling in the fingertips. He had the MRI and EMG done and returns today for results of those studies.     The patient is a/an .    Onset of symptoms/DOI was 1 year.    Symptoms are aggravated by activity and movement.    Symptoms are alleviated by rest and immobilization.    Symptoms consist of pain and numbness/tingling.    The patient rates their pain as a 1/10.    Attempted treatment(s) and/or interventions include activity modifications, rest, rest, activity modification, and anti-inflammatory medications.     The patient denies any fevers, chills, N/V, D/C and presents for evaluation.       Past Medical History:   Diagnosis Date    AC joint dislocation     Arthritis     GERD (gastroesophageal reflux disease) 2008    HIV (human immunodeficiency virus infection)     Hypertension      Past Surgical History:   Procedure Laterality Date    ARTHROSCOPIC DEBRIDEMENT OF SHOULDER Left 1/14/2019    Procedure:  DEBRIDEMENT SLAP, SHOULDER, ARTHROSCOPIC;  Surgeon: Rakesh العلي MD;  Location: [facility];  Service: Orthopedics;  Laterality: Left;  regional w/o catheter     ARTHROSCOPY OF SHOULDER WITH DECOMPRESSION OF SUBACROMIAL SPACE Left 1/14/2019    Procedure: ARTHROSCOPY, SHOULDER, WITH SUBACROMIAL DECOMPRESSION;  Surgeon: Rakesh العلي MD;  Location: [facility];  Service: Orthopedics;  Laterality: Left;    COLONOSCOPY N/A 12/13/2018    Procedure: COLONOSCOPY;  Surgeon: Shaniqua Chawla MD;  Location: [facility] (4TH FLR);  Service: Endoscopy;  Laterality: N/A;  schedule as 45 minute case ASAP     COLONOSCOPY N/A 6/27/2023    Procedure: COLONOSCOPY;  Surgeon: Shaniqua Chawla MD;  Location: [facility] (4TH FLR);  Service: Endoscopy;  Laterality: N/A;  With rectal swab   miralax prep, inst handed to pt and portal-LW    COSMETIC SURGERY      ESOPHAGOGASTRODUODENOSCOPY N/A 6/27/2023    Procedure: EGD (ESOPHAGOGASTRODUODENOSCOPY);  Surgeon: Shaniqua Chawla MD;  Location: [facility] (4TH FLR);  Service: Endoscopy;  Laterality: N/A;  6/21 pre-call confirmed; MB    FIXATION OF TENDON Left 1/14/2019    Procedure: OPEN BICEPS SUBPECTORALIS TENODESIS;  Surgeon: Rakesh العلي MD;  Location: [facility];  Service: Orthopedics;  Laterality: Left;    FRACTURE SURGERY      HERNIA REPAIR      KNEE SURGERY      LYMPH NODE BIOPSY Left 3/27/2019    Procedure: BIOPSY, LYMPH NODE Left Inguinal;  Surgeon: Patrick Lauren MD;  Location: [facility] 2ND FLR;  Service: General;  Laterality: Left;    RECONSTRUCTION OF FINGER Left 1/27/2021    Procedure: RECONSTRUCTION, FINGER - left small finger swan neck reconstruction;  Surgeon: Nikos Bullock MD;  Location: [facility];  Service: Orthopedics;  Laterality: Left;     Review of patient's allergies indicates:  No Known Allergies  Social History     Social History Narrative    Not on file     Family History   Problem Relation Name Age of Onset    Stomach cancer Mother Galina     Lung cancer  "Maternal Grandmother      Melanoma Neg Hx      Celiac disease Neg Hx      Cirrhosis Neg Hx      Colon cancer Neg Hx      Colon polyps Neg Hx      Crohn's disease Neg Hx      Cystic fibrosis Neg Hx      Esophageal cancer Neg Hx      Hemochromatosis Neg Hx      Inflammatory bowel disease Neg Hx      Irritable bowel syndrome Neg Hx      Liver cancer Neg Hx      Liver disease Neg Hx      Rectal cancer Neg Hx      Ulcerative colitis Neg Hx      Wilner's disease Neg Hx      Lymphoma Neg Hx      Tuberculosis Neg Hx      Scleroderma Neg Hx      Rheum arthritis Neg Hx      Multiple sclerosis Neg Hx      Lupus Neg Hx      Psoriasis Neg Hx      Skin cancer Neg Hx           Current Outpatient Medications:     albuterol (PROVENTIL/VENTOLIN HFA) 90 mcg/actuation inhaler, INHALE 2 PUFFS INTO THE LUNGS EVERY 6 HOURS AS NEEDED FOR WHEEZING, Disp: 25.5 g, Rfl: 0    clobetasoL (TEMOVATE) 0.05 % cream, Apply topically 2 (two) times daily as needed for rash.Stop using steroid topical when skin is smooth and non itchy.  Do not treat dark or red coloring., Disp: 60 g, Rfl: 1    hydrocortisone (ANUSOL-HC) 2.5 % rectal cream, Place rectally 2 (two) times daily., Disp: 28 g, Rfl: 2    insulin syringe-needle U-100 (EASY TOUCH INSULIN SYRINGE) 1 mL 30 gauge x 5/16 Syrg, USE AS DIRECTED DAILY WITH SEROSTIM, Disp: 480 each, Rfl: 5    insulin syringe-needle U-100 1 mL 30 gauge X 7/16" Syrg, Use as directed, Disp: 100 each, Rfl: 5    ketoconazole (NIZORAL) 2 % cream, Apply topically once daily., Disp: 15 g, Rfl: 1    lisinopriL 10 MG tablet, Take 1 tablet (10 mg total) by mouth once daily., Disp: 30 tablet, Rfl: 11    SEROSTIM 6 mg SolR, INJECT 6MG UNDER THE SKIN EVRY DAY, Disp: 28 each, Rfl: 11    Current Facility-Administered Medications:     acetaminophen tablet 650 mg, 650 mg, Oral, 1 time in Clinic/HOD, Smooth Millan MD    cabotegravir-rilpivirine 600 mg/3 mL- 900 mg/3 mL injection 6 mL, 6 mL, Intramuscular, Q8 weeks, Evertonu, Omar MARTNIEZ, " "MD, 6 mL at 10/02/23 1137    diphenhydrAMINE capsule 25 mg, 25 mg, Oral, 1 time in Clinic/LEANDRO, Smooth Millan MD    EPINEPHrine (EPIPEN) 0.3 mg/0.3 mL pen injection 0.3 mg, 0.3 mg, Intramuscular, 1 time in Clinic/LEANDRO, Smooth Millan MD    ondansetron disintegrating tablet 4 mg, 4 mg, Oral, 1 time in Clinic/[facility]DRO, Smooth Millan MD    predniSONE tablet 5 mg, 5 mg, Oral, 1 time in Clinic/HOD, Smooth Millan MD      Review of Systems:  As per HPI otherwise noncontributory    OBJECTIVE:      Vital Signs (Most Recent):  Vitals:    06/11/24 1046   Weight: 108.9 kg (240 lb 1.3 oz)   Height: 6' 4" (1.93 m)     Body mass index is 29.22 kg/m².      Physical Exam:  Constitutional: The patient appears well-developed and well-nourished. No distress.   Skin: No lesions appreciated  Head: Normocephalic and atraumatic.   Nose: Nose normal.   Ears: No deformities seen  Eyes: Conjunctivae and EOM are normal.   Neck: No tracheal deviation present.   Cardiovascular: Normal rate and intact distal pulses.    Pulmonary/Chest: Effort normal. No respiratory distress.   Abdominal: There is no guarding.   Neurological: The patient is alert.   Psychiatric: The patient has a normal mood and affect.     Bilateral Hand/Wrist Examination:    Observation/Inspection:  Swelling  none    Deformity  none  Discoloration  none     Scars   none    Atrophy  none    HAND/WRIST EXAMINATION:  Finkelstein's Test   Neg  WHAT Test    Neg  Snuff box tenderness   Neg  Bess's Test    Neg  Hook of Hamate Tenderness  Neg  CMC grind    Neg  Circumduction test   Neg  Pain to palpation 3 cm distal to the lateral epicondyle     Neurovascular Exam:  Digits WWP, brisk CR < 3s throughout  NVI motor/LTS to M/R/U nerves, radial pulse 2+  Tinel's Test - Carpal Tunnel  Positive on the right, negative on the left  Tinel's Test - Cubital Tunnel  Positive Right   Phalen's Test    Neg  Median Nerve Compression Test Neg    ROM hand full, painless    ROM wrist " full, painless    ROM elbow full, pain with elbow extension against resistance about the triceps     Abdomen not guarded  Respirations nonlabored  Perfusion intact    Diagnostic Results:     Imaging - I independently viewed the patient's imaging as well as the radiology report.  Xrays of the patient's right elbow  demonstrate no evidence of any acute fractures or dislocations or significant degenerative changes.    ASSESSMENT/PLAN:      48 y.o. yo male with right cubital tunnel syndrome symptoms and right sided symptoms of carpal tunnel. MRI consistent with triceps tendonitis    Plan: The patient and I had a thorough discussion today.  We discussed the working diagnosis as well as several other potential alternative diagnoses.  Treatment options were discussed, both conservative and surgical.  Conservative treatment options would include things such as activity modifications, workplace modifications, a period of rest, oral vs topical OTC and prescription anti-inflammatory medications, occupational therapy, splinting/bracing, immobilization, corticosteroid injections, and others.  Surgical options were discussed as well.     At this time, patient still having some burning over the forearm. MRI suggestive of triceps tendonitis and possible ulnar nerve thickening. EMG was normal but he continues to have positive Tinel's at the elbow and numbness and tingling in the 4th/5th digits. He says he would not like to undergo any surgical intervention until at least December which will give us a chance to see if his symptoms improve or worsen. Plan to see him back in October for re-evaluation.     Should the patient's symptoms worsen, persist, or fail to improve they should return for reevaluation and I would be happy to see them back anytime.        Nikos Bullock M.D.    Please be aware that this note has been generated with the assistance of [employer] voice-to-text.  Please excuse any spelling or grammatical errors.    Thank  you for choosing Dr. Nikos Bullock for your orthopedic hand and upper extremity care. It is our goal to provide you with exceptional care that will help keep you healthy, active, and get you back in the game.     If you felt that you received exemplary care today, please consider leaving feedback for Dr. Bullock on [email or web address] at [email or web address].    Please do not hesitate to reach out to us via email, phone, or MyChart with any questions, concerns, or feedback.

## 2024-09-23 DIAGNOSIS — B20 HIV WASTING SYNDROME: ICD-10-CM

## 2024-09-23 DIAGNOSIS — Z21 HIV POSITIVE: ICD-10-CM

## 2024-09-23 DIAGNOSIS — E88.A HIV WASTING SYNDROME: ICD-10-CM

## 2024-09-23 RX ORDER — SOMATROPIN 6 MG
KIT SUBCUTANEOUS
Qty: 28 EACH | Refills: 11 | Status: SHIPPED | OUTPATIENT
Start: 2024-09-23

## 2024-10-08 ENCOUNTER — TELEPHONE (OUTPATIENT)
Dept: OPHTHALMOLOGY | Facility: CLINIC | Age: 49
End: 2024-10-08
Payer: MEDICARE

## 2024-10-09 ENCOUNTER — TELEPHONE (OUTPATIENT)
Dept: OPHTHALMOLOGY | Facility: CLINIC | Age: 49
End: 2024-10-09
Payer: MEDICARE

## 2024-10-21 RX ORDER — NEEDLES, SAFETY 22GX1 1/2"
NEEDLE, DISPOSABLE MISCELLANEOUS
Qty: 100 EACH | Refills: 5 | Status: SHIPPED | OUTPATIENT
Start: 2024-10-21

## 2025-02-13 ENCOUNTER — PATIENT MESSAGE (OUTPATIENT)
Dept: GASTROENTEROLOGY | Facility: CLINIC | Age: 50
End: 2025-02-13
Payer: MEDICARE

## 2025-03-16 ENCOUNTER — PATIENT MESSAGE (OUTPATIENT)
Dept: INFECTIOUS DISEASES | Facility: CLINIC | Age: 50
End: 2025-03-16
Payer: MEDICARE

## 2025-04-02 ENCOUNTER — PATIENT MESSAGE (OUTPATIENT)
Dept: ADMINISTRATIVE | Facility: OTHER | Age: 50
End: 2025-04-02
Payer: MEDICARE

## 2025-04-02 ENCOUNTER — PATIENT MESSAGE (OUTPATIENT)
Dept: OPHTHALMOLOGY | Facility: CLINIC | Age: 50
End: 2025-04-02
Payer: MEDICARE

## 2025-04-02 ENCOUNTER — OFFICE VISIT (OUTPATIENT)
Dept: GASTROENTEROLOGY | Facility: CLINIC | Age: 50
End: 2025-04-02
Payer: MEDICARE

## 2025-04-02 ENCOUNTER — RESULTS FOLLOW-UP (OUTPATIENT)
Dept: GASTROENTEROLOGY | Facility: HOSPITAL | Age: 50
End: 2025-04-02

## 2025-04-02 ENCOUNTER — APPOINTMENT (OUTPATIENT)
Dept: LAB | Facility: HOSPITAL | Age: 50
End: 2025-04-02
Payer: MEDICARE

## 2025-04-02 VITALS — BODY MASS INDEX: 25.88 KG/M2 | HEIGHT: 76 IN | WEIGHT: 212.5 LBS

## 2025-04-02 DIAGNOSIS — R19.7 DIARRHEA, UNSPECIFIED TYPE: ICD-10-CM

## 2025-04-02 DIAGNOSIS — R63.4 WEIGHT LOSS: Primary | ICD-10-CM

## 2025-04-02 DIAGNOSIS — R10.9 ABDOMINAL PAIN, UNSPECIFIED ABDOMINAL LOCATION: ICD-10-CM

## 2025-04-02 LAB
ADV 40+41 DNA STL QL NAA+NON-PROBE: NOT DETECTED
ASTRO TYP 1-8 RNA STL QL NAA+NON-PROBE: NOT DETECTED
C CAYETANENSIS DNA STL QL NAA+NON-PROBE: NOT DETECTED
C COLI+JEJ+UPSA DNA STL QL NAA+NON-PROBE: NOT DETECTED
C DIFF GDH STL QL: NEGATIVE
C DIFF TOX A+B STL QL IA: NEGATIVE
CRYPTOSP DNA STL QL NAA+NON-PROBE: NOT DETECTED
E HISTOLYT DNA STL QL NAA+NON-PROBE: NOT DETECTED
EAEC PAA PLAS AGGR+AATA ST NAA+NON-PRB: NOT DETECTED
EC STX1+STX2 GENES STL QL NAA+NON-PROBE: NOT DETECTED
EPEC EAE GENE STL QL NAA+NON-PROBE: NOT DETECTED
ETEC LTA+ST1A+ST1B TOX ST NAA+NON-PROBE: NOT DETECTED
G LAMBLIA DNA STL QL NAA+NON-PROBE: NOT DETECTED
NOROVIRUS GI+II RNA STL QL NAA+NON-PROBE: NOT DETECTED
P SHIGELLOIDES DNA STL QL NAA+NON-PROBE: NOT DETECTED
RVA RNA STL QL NAA+NON-PROBE: NOT DETECTED
S ENT+BONG DNA STL QL NAA+NON-PROBE: NOT DETECTED
SAPO I+II+IV+V RNA STL QL NAA+NON-PROBE: NOT DETECTED
SHIGELLA SP+EIEC IPAH ST NAA+NON-PROBE: NOT DETECTED
V CHOL+PARA+VUL DNA STL QL NAA+NON-PROBE: NOT DETECTED
V CHOLERAE DNA STL QL NAA+NON-PROBE: NOT DETECTED
Y ENTEROCOL DNA STL QL NAA+NON-PROBE: NOT DETECTED

## 2025-04-02 PROCEDURE — 87324 CLOSTRIDIUM AG IA: CPT | Performed by: STUDENT IN AN ORGANIZED HEALTH CARE EDUCATION/TRAINING PROGRAM

## 2025-04-02 PROCEDURE — 87329 GIARDIA AG IA: CPT | Performed by: STUDENT IN AN ORGANIZED HEALTH CARE EDUCATION/TRAINING PROGRAM

## 2025-04-02 PROCEDURE — 87177 OVA AND PARASITES SMEARS: CPT | Performed by: STUDENT IN AN ORGANIZED HEALTH CARE EDUCATION/TRAINING PROGRAM

## 2025-04-02 PROCEDURE — 87046 STOOL CULTR AEROBIC BACT EA: CPT | Performed by: STUDENT IN AN ORGANIZED HEALTH CARE EDUCATION/TRAINING PROGRAM

## 2025-04-02 PROCEDURE — 87427 SHIGA-LIKE TOXIN AG IA: CPT | Performed by: STUDENT IN AN ORGANIZED HEALTH CARE EDUCATION/TRAINING PROGRAM

## 2025-04-02 PROCEDURE — 99999 PR PBB SHADOW E&M-EST. PATIENT-LVL IV: CPT | Mod: PBBFAC,GC,, | Performed by: STUDENT IN AN ORGANIZED HEALTH CARE EDUCATION/TRAINING PROGRAM

## 2025-04-02 PROCEDURE — 87507 IADNA-DNA/RNA PROBE TQ 12-25: CPT | Performed by: STUDENT IN AN ORGANIZED HEALTH CARE EDUCATION/TRAINING PROGRAM

## 2025-04-02 PROCEDURE — 99214 OFFICE O/P EST MOD 30 MIN: CPT | Mod: PBBFAC | Performed by: STUDENT IN AN ORGANIZED HEALTH CARE EDUCATION/TRAINING PROGRAM

## 2025-04-02 PROCEDURE — 99204 OFFICE O/P NEW MOD 45 MIN: CPT | Mod: S$PBB,GC,, | Performed by: STUDENT IN AN ORGANIZED HEALTH CARE EDUCATION/TRAINING PROGRAM

## 2025-04-02 NOTE — PROGRESS NOTES
[facility]    Reason for visit: There were no encounter diagnoses.  Referring Provider/PCP: No, Primary Doctor    History of Present Illness:  Moisés Koehler is a 49 y.o. male with HIV diagnosed in 2010 (CD4 845 6/2023, >600 on labs 3/2025) on Dovato, ocular syphilis, family history of pancreatic cancer (mother diagnosed at age 42), possible IBS (reported diagnosis in 2019) who presents to GI clinic with reports of weight loss, night sweats and diarrhea. He thinks he had an E.coli infection 11/2024, symptoms at the time were nausea, vomiting and abdominal pain. He was not formally dianosed, nor was he treated with abx. No abx use within the last several months. He has been experiencing loose stools for several years but recently, it has become watery, occurring a few times per week. He also notes an unintentional weight loss over the last few months. He was recently evaluated in the ED 3/07/2025 with a diffuse rash; found to have a leukocytosis and SHELBIE. He was recently seen by his PCP at outside facility and instructed to follow-up with GI for his diarrhea. He does have an infectious disease specialist who is managing his HIV treatment. Of note, he was previously by GI-IBD 11/2018 after he had a colonoscopy at outside facility which showed UC. He underwent repeat colonoscopy 12/2028, which was normal; rectal swab also normal. Stool studies obtained including fecal calprotectin, which were normal. He is moving to [place] in the next month.     PEndoHx:  EGD 6/27/2023 for epigastric abdominal pain: normal.   Colonoscopy 6/27/2023 for rectal bleeding: external and internal hemorrhoids, otherwise normal. Rectal swab obtained, which was negative for gonorrhea/chlamydia.  Colonoscopy 12/2018: normal, rectal swab negative for gonorrhea/chlamydia.    Review of Systems   Constitutional:  Positive for diaphoresis and weight loss.   Gastrointestinal:  Positive for diarrhea. Negative for  constipation, nausea and vomiting.     Medical History:  Past Medical History:   Diagnosis Date    AC joint dislocation     Arthritis     GERD (gastroesophageal reflux disease) 2008    HIV (human immunodeficiency virus infection)     Hypertension        Past Surgical History:   Procedure Laterality Date    ARTHROSCOPIC DEBRIDEMENT OF SHOULDER Left 1/14/2019    Procedure: DEBRIDEMENT SLAP, SHOULDER, ARTHROSCOPIC;  Surgeon: Rakesh العلي MD;  Location: [facility];  Service: Orthopedics;  Laterality: Left;  regional w/o catheter     ARTHROSCOPY OF SHOULDER WITH DECOMPRESSION OF SUBACROMIAL SPACE Left 1/14/2019    Procedure: ARTHROSCOPY, SHOULDER, WITH SUBACROMIAL DECOMPRESSION;  Surgeon: Rakesh العلي MD;  Location: [facility];  Service: Orthopedics;  Laterality: Left;    COLONOSCOPY N/A 12/13/2018    Procedure: COLONOSCOPY;  Surgeon: Shaniqua Chawla MD;  Location: [facility] (4TH FLR);  Service: Endoscopy;  Laterality: N/A;  schedule as 45 minute case ASAP     COLONOSCOPY N/A 6/27/2023    Procedure: COLONOSCOPY;  Surgeon: Shaniqua Chawla MD;  Location: [facility] (4TH FLR);  Service: Endoscopy;  Laterality: N/A;  With rectal swab   miralax prep, inst handed to pt and portal-LW    COSMETIC SURGERY      ESOPHAGOGASTRODUODENOSCOPY N/A 6/27/2023    Procedure: EGD (ESOPHAGOGASTRODUODENOSCOPY);  Surgeon: Shaniqua Chawla MD;  Location: [facility] (4TH FLR);  Service: Endoscopy;  Laterality: N/A;  6/21 pre-call confirmed; MB    FIXATION OF TENDON Left 1/14/2019    Procedure: OPEN BICEPS SUBPECTORALIS TENODESIS;  Surgeon: Rakesh العلي MD;  Location: [facility];  Service: Orthopedics;  Laterality: Left;    FRACTURE SURGERY      HERNIA REPAIR      KNEE SURGERY      LYMPH NODE BIOPSY Left 3/27/2019    Procedure: BIOPSY, LYMPH NODE Left Inguinal;  Surgeon: Patrick Lauren MD;  Location: [facility] 2ND FLR;  Service: General;  Laterality: Left;    RECONSTRUCTION OF FINGER Left 1/27/2021    Procedure: RECONSTRUCTION,  FINGER - left small finger swan neck reconstruction;  Surgeon: Nikos Bullock MD;  Location: [facility];  Service: Orthopedics;  Laterality: Left;       Family History   Problem Relation Name Age of Onset    Stomach cancer Mother Galina     Lung cancer Maternal Grandmother      Melanoma Neg Hx      Celiac disease Neg Hx      Cirrhosis Neg Hx      Colon cancer Neg Hx      Colon polyps Neg Hx      Crohn's disease Neg Hx      Cystic fibrosis Neg Hx      Esophageal cancer Neg Hx      Hemochromatosis Neg Hx      Inflammatory bowel disease Neg Hx      Irritable bowel syndrome Neg Hx      Liver cancer Neg Hx      Liver disease Neg Hx      Rectal cancer Neg Hx      Ulcerative colitis Neg Hx      Wilner's disease Neg Hx      Lymphoma Neg Hx      Tuberculosis Neg Hx      Scleroderma Neg Hx      Rheum arthritis Neg Hx      Multiple sclerosis Neg Hx      Lupus Neg Hx      Psoriasis Neg Hx      Skin cancer Neg Hx         Social History[1]    Medications Ordered Prior to Encounter[2]    Review of patient's allergies indicates:  No Known Allergies  Physical Exam  Vitals reviewed.   Constitutional:       General: He is not in acute distress.     Appearance: He is not ill-appearing.   Eyes:      General: No scleral icterus.  Abdominal:      General: There is no distension.   Neurological:      Mental Status: Mental status is at baseline.       Laboratory:  Lab Results   Component Value Date     03/07/2025    K 4.4 03/07/2025    CL 97 03/07/2025    CO2 27 03/07/2025    BUN 21 (H) 03/07/2025    CREATININE 1.5 (H) 03/07/2025    CALCIUM 10.5 03/07/2025    ANIONGAP 13 03/07/2025    ESTGFRAFRICA >60.0 05/12/2022    EGFRNONAA >60.0 05/12/2022       Lab Results   Component Value Date    ALT 25 03/07/2025    AST 39 03/07/2025    ALKPHOS 83 03/07/2025    BILITOT 1.3 (H) 03/07/2025    ALBUMIN 4.7 03/07/2025    LIPASE 28 06/14/2023       Lab Results   Component Value Date    WBC 13.15 (H) 03/07/2025    HGB 17.4 03/07/2025    HCT 51.7  03/07/2025    MCV 91 03/07/2025     03/07/2025     Assessment:  Moisés Koehler is a 49 y.o. male with HIV diagnosed in 2010 (CD4 845 6/2023, >600 on labs 3/2025) on Dovato, ocular syphilis, family history of pancreatic cancer (mother diagnosed at age 42), possible IBS (reported diagnosis in 2019) who presents to GI clinic with reports of weight loss, night sweats and diarrhea. Given his history of HIV and watery diarrhea, will rule out infectious etiology. Previously had a colonoscopy x2 for similar complaints, both of which were unrevealing.    Problems:  Diarrhea  Weight loss   Family hx of pancreatic cancer    Plan:  Stool studies.   CBC, CMP, Quantiferon gold.   Genetics referral as he has a first degree relative with pancreatic cancer.   Nephrology referral given SHELBIE.   If renal function is normal, will order CTE.   Follow up in about 2 weeks (around 4/16/2025).    Laura Wilhelm MD  Gastroenterology Fellow    This patient was discussed with Dr. Douglass.          [1]   Social History  Socioeconomic History    Marital status: Single   Tobacco Use    Smoking status: Never    Smokeless tobacco: Never   Substance and Sexual Activity    Alcohol use: No    Drug use: Yes     Frequency: 7.0 times per week     Types: Marijuana    Sexual activity: Not Currently     Partners: Female     Birth control/protection: Condom     Social Drivers of Health     Financial Resource Strain: Medium Risk (3/26/2025)    Overall Financial Resource Strain (CARDIA)     Difficulty of Paying Living Expenses: Somewhat hard   Food Insecurity: No Food Insecurity (3/26/2025)    Hunger Vital Sign     Worried About Running Out of Food in the Last Year: Never true     Ran Out of Food in the Last Year: Never true   Transportation Needs: No Transportation Needs (3/26/2025)    PRAPARE - Transportation     Lack of Transportation (Medical): No     Lack of Transportation (Non-Medical): No   Physical Activity: Insufficiently Active  "(3/26/2025)    Exercise Vital Sign     Days of Exercise per Week: 4 days     Minutes of Exercise per Session: 30 min   Stress: Stress Concern Present (3/26/2025)    [employer] [place] [employer] - Occupational Stress Questionnaire     Feeling of Stress : To some extent   Housing Stability: Low Risk  (3/26/2025)    Housing Stability Vital Sign     Unable to Pay for Housing in the Last Year: No     Number of Times Moved in the Last Year: 0     Homeless in the Last Year: No   [2]   Current Outpatient Medications on File Prior to Visit   Medication Sig Dispense Refill    albuterol (PROVENTIL/VENTOLIN HFA) 90 mcg/actuation inhaler INHALE 2 PUFFS INTO THE LUNGS EVERY 6 HOURS AS NEEDED FOR WHEEZING 25.5 g 0    cetirizine (ZYRTEC) 10 MG tablet Take 1 tablet (10 mg total) by mouth once daily. 30 tablet 0    clobetasoL (TEMOVATE) 0.05 % cream Apply topically 2 (two) times daily as needed for rash.Stop using steroid topical when skin is smooth and non itchy.  Do not treat dark or red coloring. 60 g 1    famotidine (PEPCID) 20 MG tablet Take 1 tablet (20 mg total) by mouth 2 (two) times daily. 20 tablet 0    hydrocortisone (ANUSOL-HC) 2.5 % rectal cream Place rectally 2 (two) times daily. 28 g 2    insulin syringe,safety needle (BD SAFETYGLIDE INSULIN SYRINGE) 1 mL 29 gauge x 1/2" Syrg USE AS DIRECTED DAILY WITH SEROSTIM 100 each 5    insulin syringe-needle U-100 (EASY TOUCH INSULIN SYRINGE) 1 mL 30 gauge x 5/16 Syrg USE AS DIRECTED DAILY WITH SEROSTIM 480 each 5    insulin syringe-needle U-100 1 mL 30 gauge X 7/16" Syrg Use as directed 100 each 5    ketoconazole (NIZORAL) 2 % cream Apply topically once daily. 15 g 1    lisinopriL 10 MG tablet Take 1 tablet (10 mg total) by mouth once daily. 30 tablet 11    SEROSTIM 6 mg SolR INJECT 6MG UNDER THE SKIN EVERY DAY 28 each 11     Current Facility-Administered Medications on File Prior to Visit   Medication Dose Route Frequency Provider Last Rate Last Admin    " acetaminophen tablet 650 mg  650 mg Oral 1 time in Clinic/HOD Smooth Millan MD        cabotegravir-rilpivirine 600 mg/3 mL- 900 mg/3 mL injection 6 mL  6 mL Intramuscular Q8 weeks Omar Hubbard MD   6 mL at 10/02/23 1137    diphenhydrAMINE capsule 25 mg  25 mg Oral 1 time in Clinic/[facility] Smooth Millan MD        EPINEPHrine (EPIPEN) 0.3 mg/0.3 mL pen injection 0.3 mg  0.3 mg Intramuscular 1 time in Clinic/[facility] Smooth Millan MD        ondansetron disintegrating tablet 4 mg  4 mg Oral 1 time in Clinic/[facility] Smooth Millan MD        predniSONE tablet 5 mg  5 mg Oral 1 time in Clinic/[facility] Smooth Millan MD

## 2025-04-02 NOTE — PROGRESS NOTES
I have seen the patient, reviewed Laura Wilhelm MD the GI fellow's history and physical, assessment, and plan. I have personally interviewed and examined the patient at bedside and I discussed the case with the GI fellow and I agree with the findings and plan as documented in the fellow's note.

## 2025-04-02 NOTE — PROGRESS NOTES
"GENERAL GI PATIENT INTAKE:    COVID symptoms in the last 7 days (runny nose, sore throat, congestion, cough, fever): No  PCP: No, Primary Doctor  If not PCP-  number given to establish [phone]: N/A    ALLERGIES REVIEWED:  Yes    CHIEF COMPLAINT:    Chief Complaint   Patient presents with    GI Problem       VITAL SIGNS:  Ht 6' 4" (1.93 m)   Wt 96.4 kg (212 lb 8.4 oz)   BMI 25.87 kg/m²      Change in medical, surgical, family or social history: No      REVIEWED MEDICATION LIST RECONCILED INCLUDING ABOVE MEDS:  Yes      "

## 2025-04-02 NOTE — PROGRESS NOTES
"Cancer Genetics  Hereditary/High Risk Clinic  Department of Hematology and Oncology  [facility]        Date of Service:  2025  Provider:  Chelsey Briones MS, [facility]  Collaborating Physician: Dr. Jozef Sandoval    Patient Information  Name:  Moisés Koehler  :  1975  MRN:  [id number]   The patient location is:  [place], [place]       Referring Provider:  Dr. Laura Wilhelm    Indication:   Genetic evaluation due to family history of pancreatic cancer      Relevant Medical History:  Moisés Koehler ("Moisés"), 49 y.o., assigned male sex at birth, is new to the [facility] Department of Hematology and Oncology and to me.  He was unaccompanied to the appointment. He was referred by Dr. Laura Wilhelm (Gastroenterology) for hereditary cancer risk assessment given his family history.    Focused Medical History  Previous germline cancer genetic testing:  No  Cancer:  No  Colonoscopy: Yes  Most recent colonoscopy: 2023, repeat in 10 years  Colon polyp:  No  Other tumor or pertinent mass/lesion:  No  Pancreatitis:  No  Blood cancer, blood pre-cancer, or blood condition: No    Prostate History  No issues reported  PSA: none    Dermatologic History  No issues reported  Abnormal moles/lesions: none  Skin cancer screening: as needed    Focused Social History  Never smoker    ONCOLOGY PEDIGREE  Ashkenazi Restorationist:  Yes - maternal   Consanguinity:  No  Hereditary cancer genetic testing in blood relatives:  No    Family Cancer Pedigree:  Cancer Pedigree          Maternal:  Mother (, 42) diagnosed with pancreatic cancer at 41  Aunt (, 72) diagnosed with an unknown cancer at an unknown age  Uncle (, 56) diagnosed with an unspecified blood or marrow cancer at 53  Grandmother (, 63) diagnosed with lung cancer at 61    Paternal:  No known family history of cancer    A family history of birth defects, intellectual disability, SIDS, sudden early death, multiple miscarriages and " consanguinity were denied. Please refer to above pedigree for further details. A larger copy has been scanned in the Media tab.     COUNSELING   Hereditary Pancreatic Cancer  Pancreatic cancer is a type of cancer that starts in the pancreas. Pancreatic adenocarcinoma is the most common type of pancreatic cancer. Pancreatic neuroendocrine tumors are less common. The average lifetime risk of pancreatic cancer is about 1 in 58 men and about 1 in 60 women, or about 1-2% of all people. Lifestyle risk factors include tobacco use, high BMI, diabetes, chronic pancreatitis, and exposure to chemicals used in dry cleaning and metal working industries. Risk factors that cannot be changed include being older than 45, being male, being of -American ancestry, and having a family history of pancreatic cancer.     Approximately 10% of pancreatic cancer cases are hereditary, meaning they are caused by gene mutations that can be passed down in families. Pancreatic cancer risk genes such as TALHA, BRCA1, BRCA2, CDKN2A, MLH1, MSH2, MSH6, EPCAM, PALB2, STK11, and TP53. In addition, hereditary pancreatitis, which is associated with increased risk for pancreatic cancer, is associated with the genes PRSS1 and SPINK1.    Individuals who meet the criteria below meet current guidelines for genetic testing:   Have been diagnosed with a pancreatic cancer  Have a child, sibling, or parent who is diagnosed with a pancreatic cancer  Clinical suspicion of Multiple Endocrine Neoplasia type 1     Genetic testing is clinically indicated for first-degree relatives of individuals diagnosed with a pancreatic cancer.     Genetic testing is warranted in this case because individuals with a positive genetic test or suspicious family history benefit from starting surveillance at a younger age, having risk-reducing surgeries, and additional screening for other cancers.     Modified management may also be recommended, even with a result of no or unknown  significance, based upon risk assessment that incorporates the family history. Familial pancreatic cancer refers to families who do not have a known genetic cause of cancer but have at least two immediate family members with pancreatic cancer. Individuals with a family history of exocrine pancreatic cancer in >=1 first-degree and >=1 second-degree relatives from the same side of the family, even in the absence of a known P/LP germline variant, can consider pancreatic cancer screening, which can consist of annual contrast enhanced MRI/MRCP and/or endoscopic ultrasound.     Most Informative Person to Test: Genetic testing usually provides the most information when completed for the family member who is most likely to test positive. This would be someone who had a personal history of suspicious cancer(s) (based on type, age, or number). If this individual tests negative, it is very unlikely that others in the family would have a hereditary cancer risk (unless others also have a suspicious personal history). If this family member tests positive, then testing would be recommended for other relatives.     If someone who does not have a personal history of suspicious cancer has genetic testing, a negative result is much more difficult to interpret (unless there is a known hereditary cancer predisposition in the family). There are several possibilities for a negative result in this situation:    The cancer in the family could be due to a hereditary cancer risk that this individual did not inherit. In this case, a negative result would likely reduce the risk of related cancers.  The cancer in the family may not be due to an identifiable hereditary cancer risk. The cancers in the family may be related to shared environmental or lifestyle factors or a genetic factor that cannot be identified by the test completed using current technology. As a result, the risk of cancer may still be increased based on the family history.    In  Moisés's family, the most appropriate individual to have genetic testing would be his mother. Unfortunately, she has passed away. We discussed that Moisés could undergo genetic testing, with the limitation that a negative result will not provide as much information about his cancer risks.      Potential results of genetic testing, and their implications  Potential results of genetic testing include positive, negative, and variant of unknown significance (VUS).    A positive result indicates the presence of at least one clinically significant mutation, and the patient's associated cancer risks vary depending upon the cancer susceptibility gene(s) in which there is/are a mutation(s).  With a positive result, in some cases, depending upon the specific result and the patient's clinical history, modified risk management may be recommended, including measures for risk reduction and/or surveillance; however, modified management is not always an option.    A negative result indicates that no clinically significant mutations were identified in the gene(s) tested.    A VUS indicates that there is not presently enough data for the laboratory to make a determination as to whether the variant is clinically significant.  VUSs are not typically acted upon clinically.       The ability to interpret the meaning of a negative genetic testing result in genes associated with cancer with which the patient has not personally been affected, when done prior to testing the appropriate affected relative(s), is significantly limited.  A negative result in the patient does not indicate that she cannot develop cancer, and, in fact, the patient may even be at increased risk for cancer based on shared risk factors with affected relatives.  The most informative candidates for initial genetic testing in a family are those who have been affected with cancer.     Genetic mutation inheritance  If Moisés tests positive for a mutation, his  first-degree relatives would each have a 50% chance of having the same mutation, and other, more distantly related blood-relatives would also be at risk of having the same mutation.       Genetic discrimination  The Genetic Information Nondiscrimination Act (TIMOTHY) is U.S. federal legislation that provides some protections against use of an individual's genetic information by their health insurer and by their employer. Title I of TIMOTHY prohibits most health insurers from utilizing an individual's genetic information to make decisions regarding insurance eligibility or premium charges.  Title II of TIMOTHY prohibits covered entities, including employers, from requesting the genetic information of employees and applicants. TIMOTHY does not protect individuals from genetic discrimination toward health insurance obtained through a job with the  or through the Federal Employees Health Benefits Plan; from genetic discrimination by employers with fewer than 15 employees or if employed by the ; or from genetic discrimination by any other type of policies/entities, including but not limited to life insurance, disability insurance, long-term care insurance,  benefits, and [facility] benefits.     Assessment  Moisés's reported family history meets the genetic testing criteria established by the National Comprehensive Cancer Network Genetic/Familial High-Risk Assessment: Breast, Ovarian, Pancreatic, and Prostate version 2.2025. Moisés's clinical history, including personal history and/or family history, is suggestive of a hereditary cancer syndrome.     Moisés indicated he is interested in pursuing genetic testing. The  [facility]  xG panel looks for mutations responsible for increased risk of breast, ovarian, pancreatic, prostate, colorectal, endometrial, gastric, small bowel, urothelial and renal cancers. Genes included on this panel are APC, TALHA, AXIN2, BAP1, BARD1, BMPR1A, BRCA1, BRCA2, BRIP1,  CDH1, CDKN2A, CHEK2, EPCAM, FH, FLCN, GREM1, HOXB13, MBD4, MET, MLH1, MSH2, MSH3, MSH6, MUTYH, NF1, MTHL1, PALB2, PMS2, POLD1, POLE, PTEN, RAD51C, RAD51D, SMAD4, STK11, TP53, TSC1, TSC2, and VHL.     Genetic Test Information:  Testing lab: [facility]   Test panel: xG   ICD-10 code(s): Z80.0   Verbal informed consent: Obtained   Specimen type: Blood  (Patient denies blood disorders that would necessitate a skin fibroblast specimen)   [facility]   Specimen collection date: To be scheduled   Results expected by: Approximately 2-3 weeks after the genetic testing lab receives the specimen   Results disclosure plan: Post-test visit if positive or complex result; otherwise, results will be communicated by phone call     Informed consent was reviewed with Moisés. Informed consent elements included possible results and implications, limitations of the test, use of data by the testing lab, and the Genetic Information Nondiscrimination ACT (TIMOTHY). There is also a possibility for the patient to incur out-of-pocket costs related to this testing and financial assistance was reviewed.    Plan  Moisés does meet testing criteria for genetic testing for pancreatic susceptibility genes.   We will call Moisés in 2-3 weeks from his lab appointment to discuss results. We will call for positive, negative, and variants of unknown significance results. A post-test visit will be scheduled if results are positive or complex.      Questions were encouraged and answered to the patient's satisfaction, and she verbalized understanding of information and agreement with the plan.       This assessment is based on the history and reports provided, as well as the current scientific knowledge regarding cancer genetics.     Visit Information  Visit type: audiovisual.  Approximately 21 minutes were spent face-to-face with the patient.  Approximately 35 minutes in total were spent on this encounter, which includes  face-to-face time and non-face-to-face time preparing to see the patient (e.g., review of tests), obtaining and/or reviewing separately obtained history, documenting clinical information in the electronic or other health record, independently interpreting results (not separately reported) and communicating results to the patient/family/caregiver, or care coordination (not separately reported).     Chelsey Delaney MS, [facility]  Genetic Counselor, Hereditary and High Risk Clinic

## 2025-04-03 ENCOUNTER — RESULTS FOLLOW-UP (OUTPATIENT)
Dept: GASTROENTEROLOGY | Facility: HOSPITAL | Age: 50
End: 2025-04-03

## 2025-04-03 ENCOUNTER — PATIENT MESSAGE (OUTPATIENT)
Dept: GENETICS | Facility: CLINIC | Age: 50
End: 2025-04-03
Payer: MEDICARE

## 2025-04-03 ENCOUNTER — TELEPHONE (OUTPATIENT)
Dept: HEMATOLOGY/ONCOLOGY | Facility: CLINIC | Age: 50
End: 2025-04-03
Payer: MEDICARE

## 2025-04-03 ENCOUNTER — OFFICE VISIT (OUTPATIENT)
Facility: CLINIC | Age: 50
End: 2025-04-03
Payer: MEDICARE

## 2025-04-03 DIAGNOSIS — R19.7 DIARRHEA, UNSPECIFIED TYPE: ICD-10-CM

## 2025-04-03 DIAGNOSIS — Z80.0 FAMILY HISTORY OF PANCREATIC CANCER: ICD-10-CM

## 2025-04-03 DIAGNOSIS — Z71.83 ENCOUNTER FOR NONPROCREATIVE GENETIC COUNSELING: Primary | ICD-10-CM

## 2025-04-03 LAB
CRYPTOSP AG SPEC QL: NEGATIVE
E COLI SXT1 STL QL IA: NEGATIVE
E COLI SXT2 STL QL IA: NEGATIVE
G LAMBLIA AG STL QL IA: NEGATIVE

## 2025-04-03 NOTE — TELEPHONE ENCOUNTER
-called and spoke to patient to set up genetic labs at [facility] [place] on 4/4/25---- Message from Chelsey Briones sent at 4/3/2025 11:31 AM CDT -----  Regarding: [facility] for Genetic Test  Hello! Can you please help Moisés get scheduled for genetic testing at [place] or [facility] [place]?I appreciate the help! Chelsey Briones, [facility] Cancer Genetic Counselor [phone]

## 2025-04-04 ENCOUNTER — LAB VISIT (OUTPATIENT)
Dept: LAB | Facility: OTHER | Age: 50
End: 2025-04-04
Attending: STUDENT IN AN ORGANIZED HEALTH CARE EDUCATION/TRAINING PROGRAM
Payer: MEDICARE

## 2025-04-04 DIAGNOSIS — Z80.0 FAMILY HISTORY OF PANCREATIC CANCER: ICD-10-CM

## 2025-04-04 PROCEDURE — 36415 COLL VENOUS BLD VENIPUNCTURE: CPT

## 2025-04-05 LAB — BACTERIA STL CULT: NORMAL

## 2025-04-14 ENCOUNTER — TELEPHONE (OUTPATIENT)
Dept: GENETICS | Facility: CLINIC | Age: 50
End: 2025-04-14
Payer: MEDICARE

## 2025-04-14 ENCOUNTER — RESULTS FOLLOW-UP (OUTPATIENT)
Dept: GENETICS | Facility: CLINIC | Age: 50
End: 2025-04-14

## 2025-04-14 NOTE — TELEPHONE ENCOUNTER
Cancer Genetics  Hereditary/High Risk Clinic  Department of Hematology and Oncology  [facility]        Provider:  Chelsey Briones MS, [facility]  Collaborating Physician: Dr. Jozef Curiel Ez Koehler had the xG panel through [facility] which included the following genes: APC, TALHA, BAP1, BARD1, BMPR1A, BRCA1, BRCA2, BRIP1, CDH1, CDKN2A, CHEK2, FH, FLCN, MET, MLH1, MSH2, MSH6, MUTYH, NF1, NTHL1, PALB2, PMS2, PTEN, RAD51C, RAD51D, SMAD4, STK11, TP53, TSC1, TSC2 and VHL (sequencing and deletion/duplication); AXIN2, HOXB13, MBD4, MSH3, POLD1 and POLE (sequencing only); EPCAM and GREM1 (deletion/duplication only). RNA data is routinely analyzed for use in variant interpretation for all genes.    The genetic testing was negative. No pathogenic variants (harmful differences/mutations) were reported in any of the analyzed genes.  A copy of your genetic testing results will be in your [facility] medical record.     This result reduces the chance that you could have a hereditary predisposition to cancer due to any of the tested genes. However, without a known hereditary predisposition in the family, interpretation of this negative result is limited as you do not have a personal history of cancer. Possible explanations include:   The cancer in the family may not be due to a hereditary cancer predisposition. Most cancer is not related to a hereditary cancer predisposition. However, having a family history of cancer may still impact the risk of cancer because of other shared factors like environment or lifestyle. You and your relatives should discuss the family history and any personal risk factors with a healthcare provider to determine an appropriate plan for cancer screening and risk reduction.   There is a pathogenic variant contributing to the family history of cancer, but you did not inherit it. Your relatives should speak with their healthcare providers to determine if they may benefit from genetic testing.   You  and/or your relatives could have a pathogenic variant in a gene that has not been associated with cancer or that was not tested.  You and/or your relatives could have a pathogenic variant that the current technology was not able to detect.     Testing your relatives who have a personal history of cancer may help provide more information about what this result means for you and your risk of cancer. It could also help provide information for other family members.    Since your test was negative, your children would not need genetic testing related to your diagnosis at this time. However, genetic testing may still be needed based on other family history.     Cancer Risks and Recommendations:  A negative genetic test result does not eliminate your risk for future cancers.  Your risk for cancer is related to many factors including your age, sex assigned at birth, family history, lifestyle (such as smoking history, alcohol consumption, diet, exercise habits), other health conditions, and environment (such as where you live and any exposures you have). It is always important to speak with your doctors about all of these risk factors to determine a cancer risk management and screening plan that is right for you.     The following are current guidelines that may apply to you based on your reported history and risk factors. This information is for educational purposes. Additional and/or alternative screenings may be recommended by your healthcare providers and recommendations from your healthcare providers supersede those below:    Familial Pancreatic Cancer:   Familial pancreatic cancer refers to families who do not have a known genetic cause of cancer but have at least two immediate family members with pancreatic cancer. Mr. Koehler's family history does not meet criteria for pancreatic screening based solely on family history at this time.     Modified management may also be recommended, even with a result of no or unknown  significance, based upon risk assessment that incorporates the family history. Individuals with a family history of exocrine pancreatic cancer in >=1 first-degree and >=1 second-degree relatives from the same side of the family, even in the absence of a known P/LP germline variant, can consider pancreatic cancer screening, which can consist of annual contrast enhanced MRI/MRCP and/or endoscopic ultrasound.     General Cancer Screening Recommendations:    Colorectal: Colonoscopy due in June 2033.   The National Comprehensive Cancer Network (NCCN) and the US Preventative Services Task Force (USPSTF) recommend that people between age 45-75* at average risk** of colorectal cancer have screening through one of the following methods with the typical time until screening is needed again (for negative result/no polyps found) dependent on the method chosen. A positive result or identification of a polyp may change how often screening is recommended or what method of screening should be used.  Additionally, testing may be needed sooner for other reasons, including symptoms concerning for colorectal cancer.  Colonoscopy with the next screening in 10 years.   CT colonography with the next screening in 5 years.  Flexible sigmoidoscopy with the next screening in 5-10 years.  Stool testing (looking for blood such as a guaiac-based test or a fecal immunochemical test) with the next screening in 1 year.  Stool testing (DNA based) with the next screening in 3 years.  *Between age 76-85, screening should be selectively offered based on overall health, prior screening history, and patient preferences.   **Personal history of inflammatory bowel disease, cystic fibrosis, or polyps or family history of colorectal polyps or cancer may increase the risk of colorectal cancer and have different screening recommendations.    Prostate: The National Comprehensive Cancer Network (NCCN) recommends that individuals with an average risk of prostate  cancer have a discussion with their doctors about the risks and benefits of prostate cancer early detection including prostate specific antigen (PSA) testing with or without digital rectal examination (OLIMPIA) starting at age 45. Follow up is based on age and findings.     Skin: The American Academy of Dermatology encourages regular skin self-exams and reporting any new spots to a healthcare provider.       Please continue to follow up with all healthcare providers as directed.    Please message me back or call ([phone]) with any questions or any new information about your personal or family history as this could change our assessment.  This assessment is based on the history and reports provided, as well as the current scientific knowledge regarding cancer genetics. As our genetic knowledge and technology is evolving, we recommend checking in with genetics for updated information.    Chelsey Briones, [facility] Cancer Genetic Counselor   [phone]    Cc: Dr. Laura Wilhelm

## 2025-04-17 ENCOUNTER — CLINICAL SUPPORT (OUTPATIENT)
Dept: OPHTHALMOLOGY | Facility: CLINIC | Age: 50
End: 2025-04-17
Payer: MEDICARE

## 2025-04-17 ENCOUNTER — OFFICE VISIT (OUTPATIENT)
Dept: OPHTHALMOLOGY | Facility: CLINIC | Age: 50
End: 2025-04-17
Payer: MEDICARE

## 2025-04-17 DIAGNOSIS — A52.71 OCULAR SYPHILIS: Primary | ICD-10-CM

## 2025-04-17 DIAGNOSIS — H47.10 OPTIC DISC EDEMA: ICD-10-CM

## 2025-04-17 DIAGNOSIS — H43.813 VITREOUS DEGENERATION OF BOTH EYES: ICD-10-CM

## 2025-04-17 DIAGNOSIS — H43.89 VITRITIS: ICD-10-CM

## 2025-04-17 DIAGNOSIS — H04.123 DRY EYE SYNDROME OF BOTH EYES: ICD-10-CM

## 2025-04-17 DIAGNOSIS — Z21 HIV POSITIVE: ICD-10-CM

## 2025-04-17 PROCEDURE — 92134 CPTRZ OPH DX IMG PST SGM RTA: CPT | Mod: PBBFAC | Performed by: OPHTHALMOLOGY

## 2025-04-17 PROCEDURE — 99999 PR PBB SHADOW E&M-EST. PATIENT-LVL II: CPT | Mod: PBBFAC,,, | Performed by: OPHTHALMOLOGY

## 2025-04-17 PROCEDURE — 92202 OPSCPY EXTND ON/MAC DRAW: CPT | Mod: 59,PBBFAC | Performed by: OPHTHALMOLOGY

## 2025-04-17 PROCEDURE — 99212 OFFICE O/P EST SF 10 MIN: CPT | Mod: PBBFAC | Performed by: OPHTHALMOLOGY

## 2025-04-17 RX ORDER — DOLUTEGRAVIR SODIUM AND LAMIVUDINE 50; 300 MG/1; MG/1
TABLET, FILM COATED ORAL
COMMUNITY
Start: 2025-04-15

## 2025-04-17 RX ORDER — ESOMEPRAZOLE MAGNESIUM 40 MG/1
1 CAPSULE, DELAYED RELEASE ORAL
COMMUNITY

## 2025-04-17 RX ORDER — APREMILAST 30 MG/1
TABLET, FILM COATED ORAL
COMMUNITY
Start: 2025-04-15 | End: 2025-07-14

## 2025-04-17 NOTE — PROGRESS NOTES
HPI    VA OU blurry for carlo dn distnace since having COVID last month.  Eye meds: Systane BID-TID OU  Last edited by Alma Purvis on 4/17/2025  1:21 PM.           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 HIV positive  Z21 V08   3. Vitritis  H43.89 379.29   4. 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 Vitreous degeneration of both eyes  H43.813 379.21   6. Dry eye syndrome of both eyes  H04.123 375.15         1. Ocular syphilis  2. HIV positive  3. Vitritis  4. Optic disc edema  Here for retina f/u    Hx of ocular syphilis     RPR 1:128 8/17/22, quant gold neg  Has been on penicillin infusion per ID coordination since our visit 8/18/22, now complete    Overdue for f/u t    Today 4/17/2025  VA doing great, no cell or inflammation today, mild dry eye had COVID  1mo ago but doing well now    Plan: observe for now, continue f/u with ID    5. Vitreous degeneration of both eyes  No RT/RD  Plan: Observation      6. Dry eye syndrome of both eyes  Mild dry eye  Rec ATs prn       RTC 1 year DFE/OCTm OU        I saw and examined the patient and reviewed in detail the findings documented. The final examination findings, image interpretations, and plan as documented in the record represent my personal judgment and conclusions.    Alexey Cabezas MD  Vitreoretinal Surgery   [facility]

## 2025-04-21 DIAGNOSIS — R77.8 ELEVATED TOTAL PROTEIN: Primary | ICD-10-CM

## (undated) DEVICE — CLOSURE SKIN STERI STRIP 1/2X4

## (undated) DEVICE — DRAPE SURG W/TWL 17 5/8X23

## (undated) DEVICE — GLOVE BIOGEL SKINSENSE PI 8.0

## (undated) DEVICE — SPLINT CAST ROLL 2IN X 15 FT

## (undated) DEVICE — DRAPE C-ARM ELAS CLIP 42X120IN

## (undated) DEVICE — APPLICATOR CHLORAPREP ORN 26ML

## (undated) DEVICE — KIT TRIMANO CHIN

## (undated) DEVICE — DRAPE STERI INSTRUMENT 1018

## (undated) DEVICE — ELECTRODE 90 DEGREE ANGLE

## (undated) DEVICE — SUT 3-0 VICRYL / SH (J416)

## (undated) DEVICE — SLING ARM LARGE FOAM STRAP

## (undated) DEVICE — DRESSING XEROFORM 1X8IN

## (undated) DEVICE — SEE MEDLINE ITEM 157173

## (undated) DEVICE — SEE MEDLINE ITEM 157150

## (undated) DEVICE — PENCIL ELECTROSURG HOLST W/BLD

## (undated) DEVICE — SUT VICRYL 3-0 27 SH

## (undated) DEVICE — DRESSING XEROFORM FOIL PK 1X8

## (undated) DEVICE — STRIP STERI REIN CLSR 1/2X2IN

## (undated) DEVICE — SOL IRR NACL .9% 3000ML

## (undated) DEVICE — SUT MONOCRYL 4-0 PS-2

## (undated) DEVICE — APPLIER CLIP LIAGCLIP 9.375IN

## (undated) DEVICE — SUT ETHILON 4-0 PS2 18 BLK

## (undated) DEVICE — Device

## (undated) DEVICE — PAD CAST SPECIALIST STRL 4

## (undated) DEVICE — SEE MEDLINE ITEM 146308

## (undated) DEVICE — SEE MEDLINE ITEM 157166

## (undated) DEVICE — SUT COATED VICRYL 4/0 27IN

## (undated) DEVICE — BLADE SHAVER LANZA 4.2X13CM

## (undated) DEVICE — SEE MEDLINE ITEM 153151

## (undated) DEVICE — BANDAGE ACE NON LATEX 2IN

## (undated) DEVICE — SOL 9P NACL IRR PIC IL

## (undated) DEVICE — TOURNIQUET SB QC DP 18X4IN

## (undated) DEVICE — ADHESIVE DERMABOND ADVANCED

## (undated) DEVICE — PAD ELECTRODE STER 1.5X3

## (undated) DEVICE — PAD CAST SPECIALIST 2X4

## (undated) DEVICE — SLING SHOT II X-LARGE

## (undated) DEVICE — ELECTRODE REM PLYHSV RETURN 9

## (undated) DEVICE — REPAIR KIT SMALL JOINT BIO TEN

## (undated) DEVICE — SEE MEDLINE ITEM 157160

## (undated) DEVICE — IMMOBILIZER HAND ALUMINUM LRG

## (undated) DEVICE — GAUZE SPONGE 4X4 12PLY

## (undated) DEVICE — DRAPE PLASTIC U 60X72

## (undated) DEVICE — SUT CTD VICRYL 4-0 BR PS-2

## (undated) DEVICE — BLADE SURG #15 CARBON STEEL

## (undated) DEVICE — ADHESIVE MASTISOL VIAL 48/BX

## (undated) DEVICE — DRAPE STERI U-SHAPED 47X51IN

## (undated) DEVICE — KIT SHOULDER POSITIONER SPIDER

## (undated) DEVICE — SKIN MARKER DEVON 160

## (undated) DEVICE — PAD SHOULDER POLAR CARE XL

## (undated) DEVICE — SUT CTD VICRYL 3-0 UND BR

## (undated) DEVICE — SUT 2-0 VICRYL / SH (J417)

## (undated) DEVICE — DRESSING AQUACEL AG SILVER 4X4

## (undated) DEVICE — TRAY MINOR GEN SURG

## (undated) DEVICE — SUT VICRYL PLUS 3-0 SH 18IN

## (undated) DEVICE — STOCKINETTE DBL PLY ST 4X

## (undated) DEVICE — BURR OVAL CUTTING 6 MM

## (undated) DEVICE — SEE MEDLINE ITEM 152515

## (undated) DEVICE — GLOVE BIOGEL SKINSENSE PI 7.0

## (undated) DEVICE — IMMOBILIZER HAND

## (undated) DEVICE — DRAPE INCISE IOBAN 2 23X17IN

## (undated) DEVICE — TUBE SET INFLOW/OUTFLOW

## (undated) DEVICE — UNDERGLOVES BIOGEL PI SIZE 8